# Patient Record
Sex: MALE | Race: WHITE | NOT HISPANIC OR LATINO | Employment: UNEMPLOYED | URBAN - METROPOLITAN AREA
[De-identification: names, ages, dates, MRNs, and addresses within clinical notes are randomized per-mention and may not be internally consistent; named-entity substitution may affect disease eponyms.]

---

## 2022-07-29 ENCOUNTER — HOSPITAL ENCOUNTER (OUTPATIENT)
Facility: HOSPITAL | Age: 67
Discharge: HOME | End: 2022-07-29
Attending: INTERNAL MEDICINE | Admitting: INTERNAL MEDICINE
Payer: MEDICARE

## 2022-07-29 VITALS
OXYGEN SATURATION: 96 % | DIASTOLIC BLOOD PRESSURE: 103 MMHG | TEMPERATURE: 98 F | HEIGHT: 70 IN | HEART RATE: 73 BPM | WEIGHT: 261 LBS | SYSTOLIC BLOOD PRESSURE: 168 MMHG | BODY MASS INDEX: 37.37 KG/M2 | RESPIRATION RATE: 17 BRPM

## 2022-07-29 DIAGNOSIS — I25.10 CORONARY ATHEROSCLEROSIS OF NATIVE CORONARY ARTERY: Primary | ICD-10-CM

## 2022-07-29 DIAGNOSIS — I25.10 CAD (CORONARY ARTERY DISEASE): ICD-10-CM

## 2022-07-29 PROCEDURE — 92937 PRQ TRLUML REVSC CAB GRF 1: CPT | Mod: LD | Performed by: INTERNAL MEDICINE

## 2022-07-29 PROCEDURE — 27201423 OPTIME MED/SURG SUP & DEVICES STERILE SUPPLY: Performed by: INTERNAL MEDICINE

## 2022-07-29 PROCEDURE — C1725 CATH, TRANSLUMIN NON-LASER: HCPCS | Performed by: INTERNAL MEDICINE

## 2022-07-29 PROCEDURE — 93458 L HRT ARTERY/VENTRICLE ANGIO: CPT | Mod: 59 | Performed by: INTERNAL MEDICINE

## 2022-07-29 PROCEDURE — C1887 CATHETER, GUIDING: HCPCS | Performed by: INTERNAL MEDICINE

## 2022-07-29 PROCEDURE — C1894 INTRO/SHEATH, NON-LASER: HCPCS | Performed by: INTERNAL MEDICINE

## 2022-07-29 PROCEDURE — 63600175 PHARM REV CODE 636 W HCPCS: Performed by: INTERNAL MEDICINE

## 2022-07-29 PROCEDURE — 93010 ELECTROCARDIOGRAM REPORT: CPT | Mod: XE,,, | Performed by: INTERNAL MEDICINE

## 2022-07-29 PROCEDURE — 93010 EKG 12-LEAD: ICD-10-PCS | Mod: XE,,, | Performed by: INTERNAL MEDICINE

## 2022-07-29 PROCEDURE — 25500020 PHARM REV CODE 255: Performed by: INTERNAL MEDICINE

## 2022-07-29 PROCEDURE — 99153 MOD SED SAME PHYS/QHP EA: CPT | Performed by: INTERNAL MEDICINE

## 2022-07-29 PROCEDURE — 93005 ELECTROCARDIOGRAM TRACING: CPT | Mod: 59

## 2022-07-29 PROCEDURE — C1769 GUIDE WIRE: HCPCS | Performed by: INTERNAL MEDICINE

## 2022-07-29 PROCEDURE — 63600175 PHARM REV CODE 636 W HCPCS

## 2022-07-29 PROCEDURE — 25000003 PHARM REV CODE 250: Performed by: INTERNAL MEDICINE

## 2022-07-29 PROCEDURE — 99152 MOD SED SAME PHYS/QHP 5/>YRS: CPT | Performed by: INTERNAL MEDICINE

## 2022-07-29 RX ORDER — CLOPIDOGREL BISULFATE 75 MG/1
75 TABLET ORAL DAILY
COMMUNITY
Start: 2022-05-19

## 2022-07-29 RX ORDER — SODIUM CHLORIDE 9 MG/ML
INJECTION, SOLUTION INTRAVENOUS ONCE
Status: COMPLETED | OUTPATIENT
Start: 2022-07-29 | End: 2022-07-29

## 2022-07-29 RX ORDER — FENTANYL CITRATE 50 UG/ML
INJECTION, SOLUTION INTRAMUSCULAR; INTRAVENOUS
Status: DISCONTINUED | OUTPATIENT
Start: 2022-07-29 | End: 2022-07-29 | Stop reason: HOSPADM

## 2022-07-29 RX ORDER — MIDAZOLAM HYDROCHLORIDE 1 MG/ML
INJECTION INTRAMUSCULAR; INTRAVENOUS
Status: DISCONTINUED | OUTPATIENT
Start: 2022-07-29 | End: 2022-07-29 | Stop reason: HOSPADM

## 2022-07-29 RX ORDER — ATROPINE SULFATE 0.1 MG/ML
0.5 INJECTION INTRAVENOUS
Status: DISCONTINUED | OUTPATIENT
Start: 2022-07-29 | End: 2022-07-29 | Stop reason: HOSPADM

## 2022-07-29 RX ORDER — ACETAMINOPHEN 325 MG/1
650 TABLET ORAL EVERY 4 HOURS PRN
Status: DISCONTINUED | OUTPATIENT
Start: 2022-07-29 | End: 2022-07-29 | Stop reason: HOSPADM

## 2022-07-29 RX ORDER — LIDOCAINE HYDROCHLORIDE 20 MG/ML
INJECTION, SOLUTION EPIDURAL; INFILTRATION; INTRACAUDAL; PERINEURAL
Status: DISCONTINUED | OUTPATIENT
Start: 2022-07-29 | End: 2022-07-29 | Stop reason: HOSPADM

## 2022-07-29 RX ORDER — OMEPRAZOLE 40 MG/1
40 CAPSULE, DELAYED RELEASE ORAL DAILY
COMMUNITY

## 2022-07-29 RX ORDER — ROSUVASTATIN CALCIUM 20 MG/1
20 TABLET, COATED ORAL DAILY
COMMUNITY
Start: 2022-06-29

## 2022-07-29 RX ORDER — MORPHINE SULFATE 4 MG/ML
INJECTION, SOLUTION INTRAMUSCULAR; INTRAVENOUS
Status: COMPLETED
Start: 2022-07-29 | End: 2022-07-29

## 2022-07-29 RX ORDER — LISINOPRIL 30 MG/1
30 TABLET ORAL DAILY
COMMUNITY
Start: 2022-06-29

## 2022-07-29 RX ORDER — PROTAMINE SULFATE 10 MG/ML
INJECTION, SOLUTION INTRAVENOUS
Status: DISCONTINUED | OUTPATIENT
Start: 2022-07-29 | End: 2022-07-29 | Stop reason: HOSPADM

## 2022-07-29 RX ORDER — DIAZEPAM 5 MG/1
10 TABLET ORAL ONCE
Status: COMPLETED | OUTPATIENT
Start: 2022-07-29 | End: 2022-07-29

## 2022-07-29 RX ORDER — ASPIRIN 81 MG/1
81 TABLET ORAL DAILY
COMMUNITY

## 2022-07-29 RX ORDER — DIPHENHYDRAMINE HCL 25 MG
50 CAPSULE ORAL
Status: DISPENSED | OUTPATIENT
Start: 2022-07-29

## 2022-07-29 RX ORDER — SODIUM CHLORIDE 0.9 % (FLUSH) 0.9 %
10 SYRINGE (ML) INJECTION
Status: ACTIVE | OUTPATIENT
Start: 2022-07-29

## 2022-07-29 RX ORDER — HEPARIN SODIUM 1000 [USP'U]/ML
INJECTION, SOLUTION INTRAVENOUS; SUBCUTANEOUS
Status: DISCONTINUED | OUTPATIENT
Start: 2022-07-29 | End: 2022-07-29 | Stop reason: HOSPADM

## 2022-07-29 RX ORDER — CARVEDILOL 6.25 MG/1
6.25 TABLET ORAL 2 TIMES DAILY
COMMUNITY
Start: 2022-05-18

## 2022-07-29 RX ORDER — ONDANSETRON 4 MG/1
8 TABLET, ORALLY DISINTEGRATING ORAL EVERY 8 HOURS PRN
Status: DISCONTINUED | OUTPATIENT
Start: 2022-07-29 | End: 2022-07-29 | Stop reason: HOSPADM

## 2022-07-29 RX ORDER — HYDRALAZINE HYDROCHLORIDE 20 MG/ML
10 INJECTION INTRAMUSCULAR; INTRAVENOUS ONCE
Status: COMPLETED | OUTPATIENT
Start: 2022-07-29 | End: 2022-07-29

## 2022-07-29 RX ADMIN — DIAZEPAM 10 MG: 5 TABLET ORAL at 08:07

## 2022-07-29 RX ADMIN — SODIUM CHLORIDE: 9 INJECTION, SOLUTION INTRAVENOUS at 08:07

## 2022-07-29 RX ADMIN — HYDRALAZINE HYDROCHLORIDE 10 MG: 20 INJECTION, SOLUTION INTRAMUSCULAR; INTRAVENOUS at 02:07

## 2022-07-29 RX ADMIN — ACETAMINOPHEN 650 MG: 325 TABLET ORAL at 01:07

## 2022-07-29 RX ADMIN — MORPHINE SULFATE 4 MG: 4 INJECTION INTRAVENOUS at 03:07

## 2022-07-29 RX ADMIN — DIPHENHYDRAMINE HYDROCHLORIDE 50 MG: 25 CAPSULE ORAL at 08:07

## 2022-07-29 NOTE — Clinical Note
An angiography was performed of the LIMA to LAD. The angiography was performed via power injection.   Post intervention.

## 2022-07-29 NOTE — BRIEF OP NOTE
Brief Operative Note:    : Constantin Martino MD     Referring Physician: Constantin Martino     All Operators: Surgeon(s):  Constantin Martino MD     Preoperative Diagnosis: Coronary atherosclerosis of native coronary artery [I25.10]     Postop Diagnosis: Coronary atherosclerosis of native coronary artery [I25.10]    Treatments/Procedures: Procedure(s) (LRB):  ANGIOGRAM, CORONARY, INCLUDING BYPASS GRAFT, WITH LEFT HEART CATHETERIZATION (Left)  PTA, BYPASS GRAFT, USING BALLOON (N/A)    Findings: RCA occluded. Lcx occluded. LAD occluded in prox segment. LIMA-LAD with severe ISR at the anastomoses site. S/p PTCA with 1.5 mm, 2 mm CB and then finally with 2.5 mm NC using a scott wire. Mild residual disease at the prox stent (at the fibrotic anastomotic site), minimal disease in the rest of the stent.  SVG-Diag occluded. LRA - OM patent. LVEDP 12 mm Hg.  See catheterization report for full details.    Estimated Blood loss: 20 cc    Specimens removed: No    Constantin Matrino MD

## 2022-07-29 NOTE — Clinical Note
The catheter was removed from the and was repositioned into the ostial SVG graft. An angiography was performed of the diag. The angiography was performed via power injection.

## 2022-07-29 NOTE — Clinical Note
The wire was inserted into the distal left anterior descending artery.  Inserted through LIMA graft to distal LAD.

## 2022-07-29 NOTE — Clinical Note
An angiography was performed of the LIMA to LAD. The angiography was performed via power injection.  Post intervention

## 2022-07-29 NOTE — Clinical Note
The catheter was inserted into the and was removed from the ostium   left main. An angiography was performed of the left coronary arteries. Multiple views were taken. The angiography was performed via power injection.

## 2022-07-29 NOTE — Clinical Note
The defib pads were placed on the patient's chest.   Referred To Mid-Level For Closure Text (Leave Blank If You Do Not Want): After obtaining clear surgical margins the patient was sent to a mid-level provider for surgical repair.  The patient understands they will receive post-surgical care and follow-up from the mid-level provider.

## 2022-07-29 NOTE — Clinical Note
The catheter was inserted into the and was removed from the ostial LIMA graft. An angiography was performed of the LAD. Multiple views were taken. The angiography was performed via power injection.

## 2022-07-29 NOTE — Clinical Note
The catheter was inserted into the ostial LIMA graft. An angiography was performed of the LAD. Multiple views were taken. The angiography was performed via power injection.

## 2022-07-29 NOTE — Clinical Note
The catheter was repositioned into the radial graft. An angiography was performed of the OM. Multiple views were taken. The angiography was performed via power injection.

## 2022-07-29 NOTE — Clinical Note
The catheter was inserted into the and was removed from the left ventricle. Hemodynamics were performed.  and Pullback was recorded.

## 2024-04-19 ENCOUNTER — HOSPITAL ENCOUNTER (OUTPATIENT)
Facility: HOSPITAL | Age: 69
Discharge: HOME | End: 2024-04-19
Attending: INTERNAL MEDICINE | Admitting: INTERNAL MEDICINE
Payer: MEDICARE

## 2024-04-19 DIAGNOSIS — I25.10 CAD (CORONARY ARTERY DISEASE): ICD-10-CM

## 2024-04-19 DIAGNOSIS — R07.9 CHEST PAIN: ICD-10-CM

## 2024-04-19 DIAGNOSIS — R94.8 ABNORMAL PET SCAN, MEDIASTINUM: ICD-10-CM

## 2024-04-19 LAB
BSA FOR ECHO PROCEDURE: 2.46 M2
CV ECHO LV RWT: 0.46 CM
ECHO LV POSTERIOR WALL: 1.31 CM (ref 0.6–1.1)
ERYTHROCYTE [DISTWIDTH] IN BLOOD BY AUTOMATED COUNT: 12.1 % (ref 11.5–17)
FRACTIONAL SHORTENING: 28 % (ref 28–44)
HCT VFR BLD AUTO: 46 % (ref 42–52)
HGB BLD-MCNC: 15.5 G/DL (ref 14–18)
INTERVENTRICULAR SEPTUM: 1.18 CM (ref 0.6–1.1)
LEFT INTERNAL DIMENSION IN SYSTOLE: 4.13 CM (ref 2.1–4)
LEFT VENTRICLE DIASTOLIC VOLUME INDEX: 68.35 ML/M2
LEFT VENTRICLE DIASTOLIC VOLUME: 162 ML
LEFT VENTRICLE MASS INDEX: 129 G/M2
LEFT VENTRICLE SYSTOLIC VOLUME INDEX: 31.9 ML/M2
LEFT VENTRICLE SYSTOLIC VOLUME: 75.5 ML
LEFT VENTRICULAR INTERNAL DIMENSION IN DIASTOLE: 5.73 CM (ref 3.5–6)
LEFT VENTRICULAR MASS: 306.18 G
MCH RBC QN AUTO: 31.9 PG (ref 27–31)
MCHC RBC AUTO-ENTMCNC: 33.7 G/DL (ref 33–36)
MCV RBC AUTO: 94.7 FL (ref 80–94)
NRBC BLD AUTO-RTO: 0 %
PLATELET # BLD AUTO: 240 X10(3)/MCL (ref 130–400)
PMV BLD AUTO: 9.8 FL (ref 7.4–10.4)
RBC # BLD AUTO: 4.86 X10(6)/MCL (ref 4.7–6.1)
WBC # SPEC AUTO: 7.03 X10(3)/MCL (ref 4.5–11.5)
Z-SCORE OF LEFT VENTRICULAR DIMENSION IN END DIASTOLE: -5.81
Z-SCORE OF LEFT VENTRICULAR DIMENSION IN END SYSTOLE: -3.15

## 2024-04-19 PROCEDURE — 25000003 PHARM REV CODE 250: Performed by: INTERNAL MEDICINE

## 2024-04-19 PROCEDURE — 25500020 PHARM REV CODE 255: Performed by: INTERNAL MEDICINE

## 2024-04-19 PROCEDURE — 93459 L HRT ART/GRFT ANGIO: CPT | Performed by: INTERNAL MEDICINE

## 2024-04-19 PROCEDURE — 85027 COMPLETE CBC AUTOMATED: CPT | Performed by: INTERNAL MEDICINE

## 2024-04-19 PROCEDURE — C1769 GUIDE WIRE: HCPCS | Performed by: INTERNAL MEDICINE

## 2024-04-19 PROCEDURE — 63600175 PHARM REV CODE 636 W HCPCS: Performed by: INTERNAL MEDICINE

## 2024-04-19 PROCEDURE — C1894 INTRO/SHEATH, NON-LASER: HCPCS | Performed by: INTERNAL MEDICINE

## 2024-04-19 PROCEDURE — 99152 MOD SED SAME PHYS/QHP 5/>YRS: CPT | Performed by: INTERNAL MEDICINE

## 2024-04-19 PROCEDURE — 27201423 OPTIME MED/SURG SUP & DEVICES STERILE SUPPLY: Performed by: INTERNAL MEDICINE

## 2024-04-19 PROCEDURE — 99153 MOD SED SAME PHYS/QHP EA: CPT | Performed by: INTERNAL MEDICINE

## 2024-04-19 RX ORDER — MELOXICAM 7.5 MG/1
7.5 TABLET ORAL DAILY PRN
Status: ON HOLD | COMMUNITY
End: 2024-06-10 | Stop reason: HOSPADM

## 2024-04-19 RX ORDER — SODIUM CHLORIDE 0.9 % (FLUSH) 0.9 %
10 SYRINGE (ML) INJECTION
Status: ACTIVE | OUTPATIENT
Start: 2024-04-19

## 2024-04-19 RX ORDER — CYCLOBENZAPRINE HCL 10 MG
10 TABLET ORAL 3 TIMES DAILY PRN
Status: ON HOLD | COMMUNITY
End: 2024-06-10 | Stop reason: HOSPADM

## 2024-04-19 RX ORDER — NITROGLYCERIN 0.4 MG/1
0.4 TABLET SUBLINGUAL EVERY 5 MIN PRN
COMMUNITY

## 2024-04-19 RX ORDER — SODIUM CHLORIDE 9 MG/ML
INJECTION, SOLUTION INTRAVENOUS CONTINUOUS
Status: DISCONTINUED | OUTPATIENT
Start: 2024-04-19 | End: 2024-04-19 | Stop reason: HOSPADM

## 2024-04-19 RX ORDER — HYDRALAZINE HYDROCHLORIDE 20 MG/ML
10 INJECTION INTRAMUSCULAR; INTRAVENOUS EVERY 4 HOURS PRN
Status: DISCONTINUED | OUTPATIENT
Start: 2024-04-19 | End: 2024-04-19 | Stop reason: HOSPADM

## 2024-04-19 RX ORDER — FENTANYL CITRATE 50 UG/ML
INJECTION, SOLUTION INTRAMUSCULAR; INTRAVENOUS
Status: DISCONTINUED | OUTPATIENT
Start: 2024-04-19 | End: 2024-04-19 | Stop reason: HOSPADM

## 2024-04-19 RX ORDER — DIAZEPAM 5 MG/1
10 TABLET ORAL
Status: DISPENSED | OUTPATIENT
Start: 2024-04-19

## 2024-04-19 RX ORDER — LEVOCETIRIZINE DIHYDROCHLORIDE 5 MG/1
5 TABLET, FILM COATED ORAL DAILY PRN
COMMUNITY

## 2024-04-19 RX ORDER — ACETAMINOPHEN 325 MG/1
650 TABLET ORAL EVERY 4 HOURS PRN
Status: DISCONTINUED | OUTPATIENT
Start: 2024-04-19 | End: 2024-04-19 | Stop reason: HOSPADM

## 2024-04-19 RX ORDER — DIPHENHYDRAMINE HCL 25 MG
50 CAPSULE ORAL
Status: DISPENSED | OUTPATIENT
Start: 2024-04-19

## 2024-04-19 RX ORDER — LIDOCAINE HYDROCHLORIDE 10 MG/ML
INJECTION, SOLUTION EPIDURAL; INFILTRATION; INTRACAUDAL; PERINEURAL
Status: DISCONTINUED | OUTPATIENT
Start: 2024-04-19 | End: 2024-04-19 | Stop reason: HOSPADM

## 2024-04-19 RX ORDER — ISOSORBIDE MONONITRATE 30 MG/1
30 TABLET, EXTENDED RELEASE ORAL DAILY
COMMUNITY

## 2024-04-19 RX ORDER — RANOLAZINE 500 MG/1
500 TABLET, EXTENDED RELEASE ORAL 2 TIMES DAILY
COMMUNITY

## 2024-04-19 RX ORDER — MIDAZOLAM HYDROCHLORIDE 1 MG/ML
INJECTION INTRAMUSCULAR; INTRAVENOUS
Status: DISCONTINUED | OUTPATIENT
Start: 2024-04-19 | End: 2024-04-19 | Stop reason: HOSPADM

## 2024-04-19 RX ORDER — SODIUM CHLORIDE 9 MG/ML
INJECTION, SOLUTION INTRAVENOUS ONCE
Status: COMPLETED | OUTPATIENT
Start: 2024-04-19 | End: 2024-04-19

## 2024-04-19 RX ORDER — GABAPENTIN 100 MG/1
100 CAPSULE ORAL 2 TIMES DAILY
COMMUNITY

## 2024-04-19 RX ORDER — ONDANSETRON 4 MG/1
8 TABLET, ORALLY DISINTEGRATING ORAL EVERY 8 HOURS PRN
Status: DISCONTINUED | OUTPATIENT
Start: 2024-04-19 | End: 2024-04-19 | Stop reason: HOSPADM

## 2024-04-19 RX ADMIN — PERFLUTREN 1.4 ML: 6.52 INJECTION, SUSPENSION INTRAVENOUS at 03:04

## 2024-04-19 RX ADMIN — SODIUM CHLORIDE: 9 INJECTION, SOLUTION INTRAVENOUS at 06:04

## 2024-04-19 RX ADMIN — DIAZEPAM 10 MG: 5 TABLET ORAL at 07:04

## 2024-04-19 RX ADMIN — SODIUM CHLORIDE: 9 INJECTION, SOLUTION INTRAVENOUS at 12:04

## 2024-04-19 RX ADMIN — HYDRALAZINE HYDROCHLORIDE 10 MG: 20 INJECTION INTRAMUSCULAR; INTRAVENOUS at 11:04

## 2024-04-19 RX ADMIN — DIPHENHYDRAMINE HYDROCHLORIDE 50 MG: 25 CAPSULE ORAL at 07:04

## 2024-04-19 NOTE — INTERVAL H&P NOTE
Patient name: Carlos Waller  MRN: [id number]  : 1955  Cath Lab Procedure H&P Update    Pre-Procedure Assessment:    I saw and examined the patient face to face. The patient has been re-evaluated and his condition is unchanged. The reason for admission, procedure and care is still present.  Based on the patients H&P, pre-procedure physical exam, relevant diagnostic studies, NPO status and information obtained from the patient, I determine the patient is an appropriate candidate for the proposed procedure and anesthesia planned. I further certify the anesthesia risks, benefits and options have been explained to the patient to which he agrees as documented on the procedural consent.

## 2024-04-19 NOTE — DISCHARGE SUMMARY
[facility] - Cath Lab Services  Discharge Note  Short Stay    Procedure(s) (LRB):  Left heart cath (Left)      OUTCOME: Patient tolerated treatment/procedure well without complication and is now ready for discharge.    DISPOSITION: Home or Self Care    FINAL DIAGNOSIS:  CAD s/p CABG    FOLLOWUP: In clinic    DISCHARGE INSTRUCTIONS:  See dc orders.    TIME SPENT ON DISCHARGE: 31 minutes

## 2024-04-19 NOTE — Clinical Note
The catheter was repositioned into the ostial LIMA graft. An angiography was performed of the left coronary arteries. Multiple views were taken. The angiography was performed via power injection.  ck

## 2024-04-19 NOTE — Clinical Note
The catheter was inserted into the ostium   left main. Hemodynamics were performed.  An angiography was performed of the left coronary arteries. Multiple views were taken. The angiography was performed via power injection.  jl4

## 2024-04-19 NOTE — Clinical Note
The catheter was inserted into the radial graft. An angiography was performed of the left coronary arteries. Multiple views were taken. The angiography was performed via power injection.  Jr4  Radial-OM graft

## 2024-04-19 NOTE — Clinical Note
The catheter was inserted into the left ventricle. Hemodynamics were performed.  and Pullback was recorded.  pigtail

## 2024-04-22 VITALS
OXYGEN SATURATION: 98 % | RESPIRATION RATE: 20 BRPM | SYSTOLIC BLOOD PRESSURE: 129 MMHG | WEIGHT: 270.75 LBS | HEART RATE: 71 BPM | TEMPERATURE: 98 F | BODY MASS INDEX: 38.76 KG/M2 | HEIGHT: 70 IN | DIASTOLIC BLOOD PRESSURE: 72 MMHG

## 2024-04-25 ENCOUNTER — OFFICE VISIT (OUTPATIENT)
Dept: CARDIAC SURGERY | Facility: CLINIC | Age: 69
End: 2024-04-25
Payer: MEDICARE

## 2024-04-25 VITALS
WEIGHT: 275 LBS | HEART RATE: 63 BPM | DIASTOLIC BLOOD PRESSURE: 79 MMHG | BODY MASS INDEX: 39.37 KG/M2 | SYSTOLIC BLOOD PRESSURE: 119 MMHG | OXYGEN SATURATION: 98 % | HEIGHT: 70 IN

## 2024-04-25 DIAGNOSIS — I50.32 CHRONIC DIASTOLIC (CONGESTIVE) HEART FAILURE: Primary | Chronic | ICD-10-CM

## 2024-04-25 DIAGNOSIS — I25.810 CORONARY ARTERY DISEASE INVOLVING CORONARY BYPASS GRAFT OF NATIVE HEART, UNSPECIFIED WHETHER ANGINA PRESENT: ICD-10-CM

## 2024-04-25 PROBLEM — I25.10 CAD (CORONARY ARTERY DISEASE): Status: ACTIVE | Noted: 2024-04-25

## 2024-04-25 PROCEDURE — 1159F MED LIST DOCD IN RCRD: CPT | Mod: CPTII,,, | Performed by: THORACIC SURGERY (CARDIOTHORACIC VASCULAR SURGERY)

## 2024-04-25 PROCEDURE — 99204 OFFICE O/P NEW MOD 45 MIN: CPT | Mod: ,,, | Performed by: THORACIC SURGERY (CARDIOTHORACIC VASCULAR SURGERY)

## 2024-04-25 PROCEDURE — 1160F RVW MEDS BY RX/DR IN RCRD: CPT | Mod: CPTII,,, | Performed by: THORACIC SURGERY (CARDIOTHORACIC VASCULAR SURGERY)

## 2024-04-25 PROCEDURE — 3074F SYST BP LT 130 MM HG: CPT | Mod: CPTII,,, | Performed by: THORACIC SURGERY (CARDIOTHORACIC VASCULAR SURGERY)

## 2024-04-25 PROCEDURE — 3078F DIAST BP <80 MM HG: CPT | Mod: CPTII,,, | Performed by: THORACIC SURGERY (CARDIOTHORACIC VASCULAR SURGERY)

## 2024-04-25 PROCEDURE — 4010F ACE/ARB THERAPY RXD/TAKEN: CPT | Mod: CPTII,,, | Performed by: THORACIC SURGERY (CARDIOTHORACIC VASCULAR SURGERY)

## 2024-04-25 PROCEDURE — 3008F BODY MASS INDEX DOCD: CPT | Mod: CPTII,,, | Performed by: THORACIC SURGERY (CARDIOTHORACIC VASCULAR SURGERY)

## 2024-04-25 RX ORDER — ASPIRIN AND DIPYRIDAMOLE 25; 200 MG/1; MG/1
CAPSULE, EXTENDED RELEASE ORAL
Status: ON HOLD | COMMUNITY
Start: 2024-03-22 | End: 2024-06-05

## 2024-04-25 RX ORDER — EZETIMIBE 10 MG/1
10 TABLET ORAL
Status: ON HOLD | COMMUNITY
Start: 2023-11-21 | End: 2024-06-05

## 2024-04-25 NOTE — ASSESSMENT & PLAN NOTE
Discussed with Dr. Flores.  Diffuse nature of this patient's disease, previous history of coronary artery bypass grafting, and comorbid conditions lead us to advocate high-risk PCI with Impella support as the best option for revascularization for this young man.  The patient is also very reluctant to consent for repeat surgical therapy.  Will refer to Dr. Flores for scheduling.  If trans axillary Impella is necessary, will accommodate.

## 2024-04-25 NOTE — ASSESSMENT & PLAN NOTE
Will schedule right carotid Barostim procedure.  Risks, benefits, and alternatives have been discussed.  Questions have been answered.  Patient voices understanding and agrees to proceed.

## 2024-04-25 NOTE — PROGRESS NOTES
[facility]  Cardiothoracic Surgery  Consult Note    Patient Name: Carlos Waller  MRN: [id number]  Date of Service: 4/25/2024  Referring Provider: No ref. provider found    Patient information was obtained from patient, past medical records, and primary team    Subjective:     Chief Complaint   Patient presents with    Follow-up     Per MELLISSA Christina-follow up cabg redo from [facility]  DISCUSS RE-DO CABG.  LHC DONE ON 4/16/24 )*IN EPIC)  ECHO 4/19/24 IN [facility]  CAROTID US - NONE  PMH: CABG X 3, CVA, HTN, HERMINIA, HLD          History of Present Illness:  Patient is a 68-year-old black male with past history of vasculopathy who now presents for evaluation for barostim device placement.  Patient has increasing shortness of breath and dyspnea on exertion with bilateral lower extremity edema consistent with diastolic congestive heart failure.  He is on optimal medical therapy and has an AICD.  His [place] heart Association class has been judged to be 3 by Dr. Morrell.  His ejection fraction less than 20% and his NT pro BNP is 3493.  He has carotid ultrasound examination revealing no significant carotid artery disease.      The patient denies fever, chills, nausea, vomiting, headache, syncope, chest pain, back pain, or heart palpitations.  Current Outpatient Medications   Medication Sig Dispense Refill    aspirin (ECOTRIN) 81 MG EC tablet Take 81 mg by mouth once daily.      carvediloL (COREG) 6.25 MG tablet Take 6.25 mg by mouth 2 (two) times daily.      clopidogreL (PLAVIX) 75 mg tablet Take 75 mg by mouth once daily.      cyclobenzaprine (FLEXERIL) 10 MG tablet Take 10 mg by mouth 3 (three) times daily as needed for Muscle spasms.      dipyridamole-aspirin 200-25 mg (AGGRENOX)  mg CM12 Take by mouth.      ezetimibe (ZETIA) 10 mg tablet Take 10 mg by mouth.      gabapentin (NEURONTIN) 100 MG capsule Take 100 mg by mouth 2 (two) times daily.      isosorbide mononitrate (IMDUR) 30 MG 24 hr  tablet Take 30 mg by mouth once daily.      levocetirizine (XYZAL) 5 MG tablet Take 5 mg by mouth daily as needed for Allergies.      lisinopriL (PRINIVIL,ZESTRIL) 30 MG tablet Take 20 mg by mouth once daily.      meloxicam (MOBIC) 7.5 MG tablet Take 7.5 mg by mouth daily as needed for Pain.      nitroGLYCERIN (NITROSTAT) 0.4 MG SL tablet Place 0.4 mg under the tongue every 5 (five) minutes as needed for Chest pain.      omeprazole (PRILOSEC) 40 MG capsule Take 40 mg by mouth once daily.      ranolazine (RANEXA) 500 MG Tb12 Take 500 mg by mouth 2 (two) times daily.      rosuvastatin (CRESTOR) 20 MG tablet Take 20 mg by mouth once daily.       No current facility-administered medications for this visit.     Facility-Administered Medications Ordered in Other Visits   Medication Dose Route Frequency Provider Last Rate Last Admin    diazePAM tablet 10 mg  10 mg Oral On Call Procedure Constantin Martino MD   10 mg at 04/19/24 0729    diphenhydrAMINE capsule 50 mg  50 mg Oral On Call Procedure Constantin Martino MD   50 mg at 07/29/22 0844    diphenhydrAMINE capsule 50 mg  50 mg Oral On Call Procedure Constantin Martino MD   50 mg at 04/19/24 0730    sodium chloride 0.9% flush 10 mL  10 mL Intravenous PRN Constantin Martino MD        sodium chloride 0.9% flush 10 mL  10 mL Intravenous PRN Constantin Martino MD           Review of patient's allergies indicates:   Allergen Reactions    Codeine Itching       Past Medical History:   Diagnosis Date    Anemia, unspecified     Coronary artery disease     Hyperlipidemia     Hypertension      Past Surgical History:   Procedure Laterality Date    CARDIAC CATHETERIZATION  2016    CORONARY ANGIOGRAPHY INCLUDING BYPASS GRAFTS WITH CATHETERIZATION OF LEFT HEART Left 7/29/2022    Procedure: ANGIOGRAM, CORONARY, INCLUDING BYPASS GRAFT, WITH LEFT HEART CATHETERIZATION;  Surgeon: Constantin Martino MD;  Location: [facility] CATH LAB;  Service: Cardiology;  Laterality: Left;    CORONARY ARTERY BYPASS GRAFT   1999    CORONARY BYPASS GRAFT ANGIOGRAPHY  4/19/2024    Procedure: Bypass graft study;  Surgeon: Constantin Martino MD;  Location: [facility] CATH LAB;  Service: Cardiology;;    HERNIA REPAIR      LEFT HEART CATHETERIZATION Left 4/19/2024    Procedure: Left heart cath;  Surgeon: Constantin Martino MD;  Location: [facility] CATH LAB;  Service: Cardiology;  Laterality: Left;  LHC +/- PCI VIA RT FEMORAL ARTERY    PERCUTANEOUS TRANSLUMINAL BALLOON ANGIOPLASTY OF BYPASS GRAFT N/A 7/29/2022    Procedure: PTA, BYPASS GRAFT, USING BALLOON;  Surgeon: Constantin Martino MD;  Location: [facility] CATH LAB;  Service: Cardiology;  Laterality: N/A;     Family History       Problem Relation (Age of Onset)    Cancer Father    Stroke Mother          Tobacco Use    Smoking status: Never    Smokeless tobacco: Never   Substance and Sexual Activity    Alcohol use: Yes     Alcohol/week: 10.0 standard drinks of alcohol     Types: 10 Cans of beer per week    Drug use: Not Currently    Sexual activity: Not on file     Review of Systems   Constitutional: Negative. Negative for chills, diaphoresis, fever and night sweats.   HENT:  Negative for hoarse voice, sore throat and stridor.    Eyes: Negative.  Negative for blurred vision and double vision.   Cardiovascular:  Negative for chest pain, claudication, cyanosis, dyspnea on exertion, irregular heartbeat, leg swelling, orthopnea, palpitations, paroxysmal nocturnal dyspnea and syncope.   Respiratory:  Negative for cough, hemoptysis, shortness of breath, sputum production and wheezing.    Endocrine: Negative for polydipsia and polyuria.   Hematologic/Lymphatic: Negative for adenopathy and bleeding problem.   Musculoskeletal:  Positive for joint swelling, muscle weakness and stiffness.   Gastrointestinal:  Negative for abdominal pain, diarrhea, heartburn, hematemesis, hematochezia, nausea and vomiting.   Neurological:  Negative for brief paralysis, disturbances in coordination, dizziness, focal weakness, headaches,  numbness and paresthesias.     Objective:     Vitals:    04/25/24 0805   BP: 119/79   Pulse: 63        Weight: 124.7 kg (275 lb)  Body mass index is 39.46 kg/m².     STS Risk Score:  Not applicable    Physical Exam  Vitals reviewed.   Constitutional:       General: He is not in acute distress.     Appearance: Normal appearance.   HENT:      Head: Normocephalic and atraumatic.      Nose: No congestion or rhinorrhea.      Mouth/Throat:      Mouth: Mucous membranes are moist.      Pharynx: Oropharynx is clear. No oropharyngeal exudate or posterior oropharyngeal erythema.   Eyes:      Extraocular Movements: Extraocular movements intact.      Conjunctiva/sclera: Conjunctivae normal.      Pupils: Pupils are equal, round, and reactive to light.   Neck:      Thyroid: No thyroid mass.      Vascular: No carotid bruit or JVD.   Cardiovascular:      Rate and Rhythm: Normal rate and regular rhythm.      Pulses: Normal pulses.           Carotid pulses are 2+ on the right side and 2+ on the left side.       Radial pulses are 2+ on the right side and 2+ on the left side.        Femoral pulses are 2+ on the right side and 2+ on the left side.       Dorsalis pedis pulses are 2+ on the right side and 2+ on the left side.        Posterior tibial pulses are 2+ on the right side and 2+ on the left side.      Heart sounds: No murmur heard.     No friction rub. Gallop present. S3 sounds present.   Pulmonary:      Effort: Pulmonary effort is normal. No respiratory distress.      Breath sounds: Rales present. No wheezing or rhonchi.   Chest:      Chest wall: No tenderness.   Abdominal:      General: Abdomen is flat. Bowel sounds are normal. There is no distension.      Palpations: Abdomen is soft. There is no mass.      Tenderness: There is no abdominal tenderness.   Musculoskeletal:         General: No swelling. Normal range of motion.      Cervical back: Normal range of motion and neck supple.      Right lower leg: Edema present.      Left  lower leg: Edema present.   Lymphadenopathy:      Cervical: No cervical adenopathy.      Upper Body:      Right upper body: No supraclavicular or axillary adenopathy.      Left upper body: No supraclavicular or axillary adenopathy.   Skin:     General: Skin is warm and dry.   Neurological:      General: No focal deficit present.      Mental Status: He is alert and oriented to person, place, and time.      Cranial Nerves: No cranial nerve deficit.      Sensory: No sensory deficit.      Motor: No weakness.   Psychiatric:         Mood and Affect: Mood normal.          Significant Labs:  Reviewed    Significant Diagnostics:  Reviewed    Assessment/Plan:     Problem List Items Addressed This Visit          Cardiac/Vascular    Chronic diastolic (congestive) heart failure - Primary (Chronic)     Will schedule right carotid Barostim procedure.  Risks, benefits, and alternatives have been discussed.  Questions have been answered.  Patient voices understanding and agrees to proceed.             Thank you for your consult. I will follow-up with patient. Please contact us if you have any additional questions.    HANG Zuleta MD, FACC, FACS  Cardiothoracic Surgery  [facility]

## 2024-04-25 NOTE — PROGRESS NOTES
[facility]  Cardiothoracic Surgery  Consult Note    Patient Name: Carlos Waller  MRN: [id number]  Date of Service: 4/25/2024  Referring Provider: No ref. provider found    Patient information was obtained from patient, past medical records, and primary team    Subjective:     Chief Complaint   Patient presents with    Follow-up     Per MELLISSA Christina-follow up cabg redo from [facility]  DISCUSS RE-DO CABG.  LHC DONE ON 4/16/24 )*IN EPIC)  ECHO 4/19/24 IN [facility]  CAROTID US - NONE  PMH: CABG X 3, CVA, HTN, HERMINIA, HLD          History of Present Illness:  Patient is a very nice 68-year-old obese man with history of coronary artery disease status post coronary artery bypass grafting and peripheral arterial revascularization.  He comes now with stable angina and a recent left heart catheterization which reveals severe three-vessel coronary artery disease and moderate to severely depressed left ventricular function.  Coronary anatomy is indicative of severe diffuse disease.  Echocardiogram noted.    Discussed with Dr. Flores  and films reviewed with him and we have come to the shared decision that percutaneous intervention with Impella support would be the best option for this very nice patient.    Patient denies fever, chills, nausea, vomiting, headache, syncope, chest pain, back pain, shortness of breath, or palpitations.  He has had does have some dyspnea on exertion    Current Outpatient Medications   Medication Sig Dispense Refill    aspirin (ECOTRIN) 81 MG EC tablet Take 81 mg by mouth once daily.      carvediloL (COREG) 6.25 MG tablet Take 6.25 mg by mouth 2 (two) times daily.      clopidogreL (PLAVIX) 75 mg tablet Take 75 mg by mouth once daily.      cyclobenzaprine (FLEXERIL) 10 MG tablet Take 10 mg by mouth 3 (three) times daily as needed for Muscle spasms.      dipyridamole-aspirin 200-25 mg (AGGRENOX)  mg CM12 Take by mouth.      ezetimibe (ZETIA) 10 mg tablet Take 10 mg by  mouth.      gabapentin (NEURONTIN) 100 MG capsule Take 100 mg by mouth 2 (two) times daily.      isosorbide mononitrate (IMDUR) 30 MG 24 hr tablet Take 30 mg by mouth once daily.      levocetirizine (XYZAL) 5 MG tablet Take 5 mg by mouth daily as needed for Allergies.      lisinopriL (PRINIVIL,ZESTRIL) 30 MG tablet Take 20 mg by mouth once daily.      meloxicam (MOBIC) 7.5 MG tablet Take 7.5 mg by mouth daily as needed for Pain.      nitroGLYCERIN (NITROSTAT) 0.4 MG SL tablet Place 0.4 mg under the tongue every 5 (five) minutes as needed for Chest pain.      omeprazole (PRILOSEC) 40 MG capsule Take 40 mg by mouth once daily.      ranolazine (RANEXA) 500 MG Tb12 Take 500 mg by mouth 2 (two) times daily.      rosuvastatin (CRESTOR) 20 MG tablet Take 20 mg by mouth once daily.       No current facility-administered medications for this visit.     Facility-Administered Medications Ordered in Other Visits   Medication Dose Route Frequency Provider Last Rate Last Admin    diazePAM tablet 10 mg  10 mg Oral On Call Procedure Constantin Martino MD   10 mg at 04/19/24 0729    diphenhydrAMINE capsule 50 mg  50 mg Oral On Call Procedure Constantin Martino MD   50 mg at 07/29/22 0844    diphenhydrAMINE capsule 50 mg  50 mg Oral On Call Procedure Constantin Martino MD   50 mg at 04/19/24 0730    sodium chloride 0.9% flush 10 mL  10 mL Intravenous PRN Constantin Martino MD        sodium chloride 0.9% flush 10 mL  10 mL Intravenous PRN Constantin Martino MD           Review of patient's allergies indicates:   Allergen Reactions    Codeine Itching       Past Medical History:   Diagnosis Date    Anemia, unspecified     Coronary artery disease     Hyperlipidemia     Hypertension      Past Surgical History:   Procedure Laterality Date    CARDIAC CATHETERIZATION  2016    CORONARY ANGIOGRAPHY INCLUDING BYPASS GRAFTS WITH CATHETERIZATION OF LEFT HEART Left 7/29/2022    Procedure: ANGIOGRAM, CORONARY, INCLUDING BYPASS GRAFT, WITH LEFT HEART  CATHETERIZATION;  Surgeon: Constantin Martino MD;  Location: [facility] CATH LAB;  Service: Cardiology;  Laterality: Left;    CORONARY ARTERY BYPASS GRAFT  1999    CORONARY BYPASS GRAFT ANGIOGRAPHY  4/19/2024    Procedure: Bypass graft study;  Surgeon: Constantin Martino MD;  Location: [facility] CATH LAB;  Service: Cardiology;;    HERNIA REPAIR      LEFT HEART CATHETERIZATION Left 4/19/2024    Procedure: Left heart cath;  Surgeon: Constantin Martino MD;  Location: [facility] CATH LAB;  Service: Cardiology;  Laterality: Left;  LHC +/- PCI VIA RT FEMORAL ARTERY    PERCUTANEOUS TRANSLUMINAL BALLOON ANGIOPLASTY OF BYPASS GRAFT N/A 7/29/2022    Procedure: PTA, BYPASS GRAFT, USING BALLOON;  Surgeon: Constantin Martino MD;  Location: [facility] CATH LAB;  Service: Cardiology;  Laterality: N/A;     Family History       Problem Relation (Age of Onset)    Cancer Father    Stroke Mother          Tobacco Use    Smoking status: Never    Smokeless tobacco: Never   Substance and Sexual Activity    Alcohol use: Yes     Alcohol/week: 10.0 standard drinks of alcohol     Types: 10 Cans of beer per week    Drug use: Not Currently    Sexual activity: Not on file     ROS  Objective:     Vitals:    04/25/24 0805   BP: 119/79   Pulse: 63        Weight: 124.7 kg (275 lb)  Body mass index is 39.46 kg/m².     STS Risk Score:  12    Physical Exam     Significant Labs:  Reviewed    Significant Diagnostics:  Reviewed    Assessment/Plan:     Problem List Items Addressed This Visit          Cardiac/Vascular    CAD (coronary artery disease)     Discussed with Dr. Flores.  Diffuse nature of this patient's disease, previous history of coronary artery bypass grafting, and comorbid conditions lead us to advocate high-risk PCI with Impella support as the best option for revascularization for this young man.  The patient is also very reluctant to consent for repeat surgical therapy.  Will refer to Dr. Flores for scheduling.  If trans axillary Impella is necessary, will  accommodate.          Other Visit Diagnoses       Chronic diastolic (congestive) heart failure  (Chronic)   -  Primary             Thank you for your consult. I will follow-up with patient. Please contact us if you have any additional questions.    HANG Zuleta MD, FACC, FACS  Cardiothoracic Surgery  [facility]

## 2024-05-21 DIAGNOSIS — Z79.01 ADMISSION FOR LONG-TERM (CURRENT) USE OF ANTICOAGULANTS: ICD-10-CM

## 2024-05-21 DIAGNOSIS — I25.810 CORONARY ARTERY DISEASE INVOLVING CORONARY BYPASS GRAFT OF NATIVE HEART, UNSPECIFIED WHETHER ANGINA PRESENT: Primary | ICD-10-CM

## 2024-05-21 DIAGNOSIS — Z51.81 ADMISSION FOR LONG-TERM (CURRENT) USE OF ANTICOAGULANTS: ICD-10-CM

## 2024-05-21 RX ORDER — MUPIROCIN 20 MG/G
OINTMENT TOPICAL
Status: CANCELLED | OUTPATIENT
Start: 2024-05-21 | End: 2024-05-21

## 2024-05-28 ENCOUNTER — HOSPITAL ENCOUNTER (OUTPATIENT)
Dept: RADIOLOGY | Facility: HOSPITAL | Age: 69
Discharge: HOME | End: 2024-05-28
Attending: THORACIC SURGERY (CARDIOTHORACIC VASCULAR SURGERY)
Payer: MEDICARE

## 2024-05-28 DIAGNOSIS — I25.810 CORONARY ARTERY DISEASE INVOLVING CORONARY BYPASS GRAFT OF NATIVE HEART, UNSPECIFIED WHETHER ANGINA PRESENT: ICD-10-CM

## 2024-05-28 PROCEDURE — 71046 X-RAY EXAM CHEST 2 VIEWS: CPT | Mod: TC

## 2024-05-29 ENCOUNTER — ANESTHESIA EVENT (OUTPATIENT)
Dept: SURGERY | Facility: HOSPITAL | Age: 69
DRG: 215 | End: 2024-05-29
Payer: MEDICARE

## 2024-06-05 ENCOUNTER — ANESTHESIA (OUTPATIENT)
Dept: SURGERY | Facility: HOSPITAL | Age: 69
DRG: 215 | End: 2024-06-05
Payer: MEDICARE

## 2024-06-05 ENCOUNTER — HOSPITAL ENCOUNTER (INPATIENT)
Facility: HOSPITAL | Age: 69
LOS: 5 days | Discharge: HOME HEALTH | DRG: 215 | End: 2024-06-10
Attending: THORACIC SURGERY (CARDIOTHORACIC VASCULAR SURGERY) | Admitting: INTERNAL MEDICINE
Payer: MEDICARE

## 2024-06-05 DIAGNOSIS — I25.10 CORONARY ARTERY DISEASE INVOLVING NATIVE CORONARY ARTERY OF NATIVE HEART, UNSPECIFIED WHETHER ANGINA PRESENT: ICD-10-CM

## 2024-06-05 DIAGNOSIS — I25.10 CAD (CORONARY ARTERY DISEASE): ICD-10-CM

## 2024-06-05 DIAGNOSIS — R07.9 CHEST PAIN: ICD-10-CM

## 2024-06-05 DIAGNOSIS — T82.01XD: ICD-10-CM

## 2024-06-05 DIAGNOSIS — I25.10 CORONARY ATHEROSCLEROSIS OF NATIVE CORONARY ARTERY: ICD-10-CM

## 2024-06-05 DIAGNOSIS — Z95.3 STATUS POST TRANSCATHETER AORTIC VALVE REPLACEMENT (TAVR) USING BIOPROSTHESIS: ICD-10-CM

## 2024-06-05 DIAGNOSIS — I25.810 CORONARY ARTERY DISEASE INVOLVING CORONARY BYPASS GRAFT OF NATIVE HEART, UNSPECIFIED WHETHER ANGINA PRESENT: ICD-10-CM

## 2024-06-05 DIAGNOSIS — I25.10 CORONARY ARTERY DISEASE: ICD-10-CM

## 2024-06-05 DIAGNOSIS — Z95.2 S/P TAVR (TRANSCATHETER AORTIC VALVE REPLACEMENT): ICD-10-CM

## 2024-06-05 DIAGNOSIS — M79.603 ARM PAIN: ICD-10-CM

## 2024-06-05 DIAGNOSIS — R52 PAIN: ICD-10-CM

## 2024-06-05 LAB
ALBUMIN SERPL-MCNC: 3.5 G/DL (ref 3.4–4.8)
ALBUMIN/GLOB SERPL: 1.5 RATIO (ref 1.1–2)
ALLENS TEST BLOOD GAS (OHS): NORMAL
ALP SERPL-CCNC: 52 UNIT/L (ref 40–150)
ALT SERPL-CCNC: 17 UNIT/L (ref 0–55)
ANION GAP SERPL CALC-SCNC: 10 MEQ/L
ANION GAP SERPL CALC-SCNC: 13 MEQ/L
APTT PPP: 93.8 SECONDS (ref 23.2–33.7)
AST SERPL-CCNC: 26 UNIT/L (ref 5–34)
BASE EXCESS BLD CALC-SCNC: 0.5 MMOL/L
BASE EXCESS BLD CALC-SCNC: 1 MMOL/L
BASOPHILS # BLD AUTO: 0.03 X10(3)/MCL
BASOPHILS # BLD AUTO: 0.04 X10(3)/MCL
BASOPHILS NFR BLD AUTO: 0.2 %
BASOPHILS NFR BLD AUTO: 0.3 %
BILIRUB SERPL-MCNC: 2.2 MG/DL
BLOOD GAS SAMPLE TYPE (OHS): ABNORMAL
BLOOD GAS SAMPLE TYPE (OHS): NORMAL
BUN SERPL-MCNC: 17.1 MG/DL (ref 8.4–25.7)
BUN SERPL-MCNC: 17.6 MG/DL (ref 8.4–25.7)
CA-I BLD-SCNC: 1.14 MMOL/L (ref 1.12–1.32)
CA-I BLD-SCNC: 1.15 MMOL/L (ref 1.12–1.23)
CALCIUM SERPL-MCNC: 8.5 MG/DL (ref 8.8–10)
CALCIUM SERPL-MCNC: 8.6 MG/DL (ref 8.8–10)
CHLORIDE SERPL-SCNC: 106 MMOL/L (ref 98–107)
CHLORIDE SERPL-SCNC: 106 MMOL/L (ref 98–107)
CK MB SERPL-MCNC: 3.6 NG/ML
CK SERPL-CCNC: 240 U/L (ref 30–200)
CO2 BLDA-SCNC: 24.8 MMOL/L
CO2 BLDA-SCNC: 25.7 MMOL/L
CO2 SERPL-SCNC: 18 MMOL/L (ref 23–31)
CO2 SERPL-SCNC: 21 MMOL/L (ref 23–31)
COHGB MFR BLDA: 1.7 %
COHGB MFR BLDA: 2.1 %
CREAT SERPL-MCNC: 0.73 MG/DL (ref 0.73–1.18)
CREAT SERPL-MCNC: 0.8 MG/DL (ref 0.73–1.18)
CREAT/UREA NIT SERPL: 22
CREAT/UREA NIT SERPL: 23
DRAWN BY BLOOD GAS (OHS): ABNORMAL
DRAWN BY BLOOD GAS (OHS): NORMAL
EOSINOPHIL # BLD AUTO: 0.02 X10(3)/MCL (ref 0–0.9)
EOSINOPHIL # BLD AUTO: 0.03 X10(3)/MCL (ref 0–0.9)
EOSINOPHIL NFR BLD AUTO: 0.1 %
EOSINOPHIL NFR BLD AUTO: 0.2 %
ERYTHROCYTE [DISTWIDTH] IN BLOOD BY AUTOMATED COUNT: 12.6 % (ref 11.5–17)
ERYTHROCYTE [DISTWIDTH] IN BLOOD BY AUTOMATED COUNT: 12.6 % (ref 11.5–17)
GFR SERPLBLD CREATININE-BSD FMLA CKD-EPI: >60 ML/MIN/1.73/M2
GFR SERPLBLD CREATININE-BSD FMLA CKD-EPI: >60 ML/MIN/1.73/M2
GLOBULIN SER-MCNC: 2.3 GM/DL (ref 2.4–3.5)
GLUCOSE SERPL-MCNC: 127 MG/DL (ref 70–110)
GLUCOSE SERPL-MCNC: 150 MG/DL (ref 82–115)
GLUCOSE SERPL-MCNC: 164 MG/DL (ref 82–115)
GROUP & RH: NORMAL
HCO3 BLDA-SCNC: 23.8 MMOL/L
HCO3 BLDA-SCNC: 24.6 MMOL/L
HCO3 UR-SCNC: 24.3 MMOL/L (ref 24–28)
HCT VFR BLD AUTO: 40.8 % (ref 42–52)
HCT VFR BLD AUTO: 40.8 % (ref 42–52)
HCT VFR BLD CALC: 39 %PCV (ref 36–54)
HGB BLD-MCNC: 13 G/DL
HGB BLD-MCNC: 13.7 G/DL (ref 14–18)
HGB BLD-MCNC: 13.9 G/DL (ref 14–18)
IMM GRANULOCYTES # BLD AUTO: 0.04 X10(3)/MCL (ref 0–0.04)
IMM GRANULOCYTES # BLD AUTO: 0.07 X10(3)/MCL (ref 0–0.04)
IMM GRANULOCYTES NFR BLD AUTO: 0.3 %
IMM GRANULOCYTES NFR BLD AUTO: 0.5 %
INDIRECT COOMBS: NORMAL
INR PPP: 1.2
LYMPHOCYTES # BLD AUTO: 1.77 X10(3)/MCL (ref 0.6–4.6)
LYMPHOCYTES # BLD AUTO: 2.21 X10(3)/MCL (ref 0.6–4.6)
LYMPHOCYTES NFR BLD AUTO: 11.8 %
LYMPHOCYTES NFR BLD AUTO: 15 %
MCH RBC QN AUTO: 31.9 PG (ref 27–31)
MCH RBC QN AUTO: 32 PG (ref 27–31)
MCHC RBC AUTO-ENTMCNC: 33.6 G/DL (ref 33–36)
MCHC RBC AUTO-ENTMCNC: 34.1 G/DL (ref 33–36)
MCV RBC AUTO: 93.8 FL (ref 80–94)
MCV RBC AUTO: 95.1 FL (ref 80–94)
METHGB MFR BLDA: 1.3 %
METHGB MFR BLDA: 1.3 %
MONOCYTES # BLD AUTO: 0.92 X10(3)/MCL (ref 0.1–1.3)
MONOCYTES # BLD AUTO: 0.96 X10(3)/MCL (ref 0.1–1.3)
MONOCYTES NFR BLD AUTO: 6.1 %
MONOCYTES NFR BLD AUTO: 6.5 %
NEUTROPHILS # BLD AUTO: 11.42 X10(3)/MCL (ref 2.1–9.2)
NEUTROPHILS # BLD AUTO: 12.26 X10(3)/MCL (ref 2.1–9.2)
NEUTROPHILS NFR BLD AUTO: 77.5 %
NEUTROPHILS NFR BLD AUTO: 81.5 %
NRBC BLD AUTO-RTO: 0 %
NRBC BLD AUTO-RTO: 0 %
O2 HB BLOOD GAS (OHS): 68.7 %
O2 HB BLOOD GAS (OHS): 93.6 %
OHS QRS DURATION: 100 MS
OHS QTC CALCULATION: 468 MS
OXYHGB MFR BLDA: 14.4 G/DL
OXYHGB MFR BLDA: 14.4 G/DL
PCO2 BLDA: 32 MMHG
PCO2 BLDA: 37 MMHG (ref 20–50)
PCO2 BLDA: 40.1 MMHG (ref 35–45)
PH BLDA: 7.43 [PH] (ref 7.3–7.6)
PH BLDA: 7.48 [PH]
PH SMN: 7.39 [PH] (ref 7.35–7.45)
PLATELET # BLD AUTO: 264 X10(3)/MCL (ref 130–400)
PLATELET # BLD AUTO: 276 X10(3)/MCL (ref 130–400)
PMV BLD AUTO: 10.2 FL (ref 7.4–10.4)
PMV BLD AUTO: 10.6 FL (ref 7.4–10.4)
PO2 BLDA: 149 MMHG (ref 80–100)
PO2 BLDA: 66 MMHG
PO2 BLDA: <38 MMHG
POC BE: -1 MMOL/L
POC IONIZED CALCIUM: 1.3 MMOL/L (ref 1.06–1.42)
POC SATURATED O2: 99 % (ref 95–100)
POC TCO2: 26 MMOL/L (ref 23–27)
POCT GLUCOSE: 133 MG/DL (ref 70–110)
POCT GLUCOSE: 91 MG/DL (ref 70–110)
POTASSIUM BLD-SCNC: 3.9 MMOL/L (ref 3.5–5.1)
POTASSIUM BLOOD GAS (OHS): 3.8 MMOL/L
POTASSIUM BLOOD GAS (OHS): 3.9 MMOL/L (ref 3.5–5)
POTASSIUM SERPL-SCNC: 3.9 MMOL/L (ref 3.5–5.1)
POTASSIUM SERPL-SCNC: 4 MMOL/L (ref 3.5–5.1)
PROT SERPL-MCNC: 5.8 GM/DL (ref 5.8–7.6)
PROTHROMBIN TIME: 14.5 SECONDS (ref 12.5–14.5)
RBC # BLD AUTO: 4.29 X10(6)/MCL (ref 4.7–6.1)
RBC # BLD AUTO: 4.35 X10(6)/MCL (ref 4.7–6.1)
SAMPLE SITE BLOOD GAS (OHS): NORMAL
SAMPLE: ABNORMAL
SAO2 % BLDA: 52.7 %
SAO2 % BLDA: 94.2 %
SODIUM BLD-SCNC: 136 MMOL/L (ref 136–145)
SODIUM BLOOD GAS (OHS): 132 MMOL/L
SODIUM BLOOD GAS (OHS): 134 MMOL/L (ref 137–145)
SODIUM SERPL-SCNC: 137 MMOL/L (ref 136–145)
SODIUM SERPL-SCNC: 137 MMOL/L (ref 136–145)
SPECIMEN OUTDATE: NORMAL
WBC # SPEC AUTO: 14.73 X10(3)/MCL (ref 4.5–11.5)
WBC # SPEC AUTO: 15.04 X10(3)/MCL (ref 4.5–11.5)

## 2024-06-05 PROCEDURE — 92944: CPT | Mod: LD | Performed by: INTERNAL MEDICINE

## 2024-06-05 PROCEDURE — 85730 THROMBOPLASTIN TIME PARTIAL: CPT

## 2024-06-05 PROCEDURE — 34716 OPN AX/SUBCLA ART EXPOS CNDT: CPT | Mod: AS,LT,, | Performed by: PHYSICIAN ASSISTANT

## 2024-06-05 PROCEDURE — C1751 CATH, INF, PER/CENT/MIDLINE: HCPCS | Performed by: INTERNAL MEDICINE

## 2024-06-05 PROCEDURE — 36415 COLL VENOUS BLD VENIPUNCTURE: CPT | Performed by: HOSPITALIST

## 2024-06-05 PROCEDURE — 99900035 HC TECH TIME PER 15 MIN (STAT)

## 2024-06-05 PROCEDURE — C1894 INTRO/SHEATH, NON-LASER: HCPCS | Performed by: THORACIC SURGERY (CARDIOTHORACIC VASCULAR SURGERY)

## 2024-06-05 PROCEDURE — 85025 COMPLETE CBC W/AUTO DIFF WBC: CPT | Performed by: HOSPITALIST

## 2024-06-05 PROCEDURE — 27801859 OPTIME CATHETER, IMPELLA: Performed by: THORACIC SURGERY (CARDIOTHORACIC VASCULAR SURGERY)

## 2024-06-05 PROCEDURE — 92972 PERQ TRLUML CORONRY LITHOTRP: CPT | Performed by: INTERNAL MEDICINE

## 2024-06-05 PROCEDURE — 86900 BLOOD TYPING SEROLOGIC ABO: CPT | Performed by: THORACIC SURGERY (CARDIOTHORACIC VASCULAR SURGERY)

## 2024-06-05 PROCEDURE — 37000008 HC ANESTHESIA 1ST 15 MINUTES: Performed by: THORACIC SURGERY (CARDIOTHORACIC VASCULAR SURGERY)

## 2024-06-05 PROCEDURE — 027034Z DILATION OF CORONARY ARTERY, ONE ARTERY WITH DRUG-ELUTING INTRALUMINAL DEVICE, PERCUTANEOUS APPROACH: ICD-10-PCS | Performed by: INTERNAL MEDICINE

## 2024-06-05 PROCEDURE — C1769 GUIDE WIRE: HCPCS | Performed by: THORACIC SURGERY (CARDIOTHORACIC VASCULAR SURGERY)

## 2024-06-05 PROCEDURE — 25000003 PHARM REV CODE 250: Performed by: INTERNAL MEDICINE

## 2024-06-05 PROCEDURE — 36000713 HC OR TIME LEV V EA ADD 15 MIN: Performed by: THORACIC SURGERY (CARDIOTHORACIC VASCULAR SURGERY)

## 2024-06-05 PROCEDURE — C9607 PERC D-E COR REVASC CHRO SIN: HCPCS | Mod: LD | Performed by: INTERNAL MEDICINE

## 2024-06-05 PROCEDURE — C1769 GUIDE WIRE: HCPCS | Performed by: INTERNAL MEDICINE

## 2024-06-05 PROCEDURE — 93320 DOPPLER ECHO COMPLETE: CPT | Mod: 26,,, | Performed by: ANESTHESIOLOGY

## 2024-06-05 PROCEDURE — 82803 BLOOD GASES ANY COMBINATION: CPT

## 2024-06-05 PROCEDURE — X2HX0F9 INSERTION OF CONDUIT TO SHORT-TERM EXTERNAL HEART ASSIST SYSTEM INTO THORACIC AORTA, ASCENDING, OPEN APPROACH, NEW TECHNOLOGY GROUP 9: ICD-10-PCS | Performed by: THORACIC SURGERY (CARDIOTHORACIC VASCULAR SURGERY)

## 2024-06-05 PROCEDURE — D9220A PRA ANESTHESIA: Mod: CRNA,,, | Performed by: NURSE ANESTHETIST, CERTIFIED REGISTERED

## 2024-06-05 PROCEDURE — C1874 STENT, COATED/COV W/DEL SYS: HCPCS | Performed by: INTERNAL MEDICINE

## 2024-06-05 PROCEDURE — 5A0221D ASSISTANCE WITH CARDIAC OUTPUT USING IMPELLER PUMP, CONTINUOUS: ICD-10-PCS | Performed by: THORACIC SURGERY (CARDIOTHORACIC VASCULAR SURGERY)

## 2024-06-05 PROCEDURE — 93451 RIGHT HEART CATH: CPT | Performed by: INTERNAL MEDICINE

## 2024-06-05 PROCEDURE — B2131ZZ FLUOROSCOPY OF MULTIPLE CORONARY ARTERY BYPASS GRAFTS USING LOW OSMOLAR CONTRAST: ICD-10-PCS | Performed by: INTERNAL MEDICINE

## 2024-06-05 PROCEDURE — 86923 COMPATIBILITY TEST ELECTRIC: CPT | Performed by: THORACIC SURGERY (CARDIOTHORACIC VASCULAR SURGERY)

## 2024-06-05 PROCEDURE — 80053 COMPREHEN METABOLIC PANEL: CPT | Performed by: STUDENT IN AN ORGANIZED HEALTH CARE EDUCATION/TRAINING PROGRAM

## 2024-06-05 PROCEDURE — 02HA3RZ INSERTION OF SHORT-TERM EXTERNAL HEART ASSIST SYSTEM INTO HEART, PERCUTANEOUS APPROACH: ICD-10-PCS | Performed by: THORACIC SURGERY (CARDIOTHORACIC VASCULAR SURGERY)

## 2024-06-05 PROCEDURE — 86850 RBC ANTIBODY SCREEN: CPT | Performed by: THORACIC SURGERY (CARDIOTHORACIC VASCULAR SURGERY)

## 2024-06-05 PROCEDURE — 93005 ELECTROCARDIOGRAM TRACING: CPT

## 2024-06-05 PROCEDURE — 63600175 PHARM REV CODE 636 W HCPCS: Performed by: THORACIC SURGERY (CARDIOTHORACIC VASCULAR SURGERY)

## 2024-06-05 PROCEDURE — C1760 CLOSURE DEV, VASC: HCPCS | Performed by: INTERNAL MEDICINE

## 2024-06-05 PROCEDURE — C1885 CATH, TRANSLUMIN ANGIO LASER: HCPCS | Performed by: INTERNAL MEDICINE

## 2024-06-05 PROCEDURE — 37000009 HC ANESTHESIA EA ADD 15 MINS: Performed by: THORACIC SURGERY (CARDIOTHORACIC VASCULAR SURGERY)

## 2024-06-05 PROCEDURE — 37799 UNLISTED PX VASCULAR SURGERY: CPT

## 2024-06-05 PROCEDURE — 63600175 PHARM REV CODE 636 W HCPCS: Mod: JZ,JG | Performed by: INTERNAL MEDICINE

## 2024-06-05 PROCEDURE — B2111ZZ FLUOROSCOPY OF MULTIPLE CORONARY ARTERIES USING LOW OSMOLAR CONTRAST: ICD-10-PCS | Performed by: INTERNAL MEDICINE

## 2024-06-05 PROCEDURE — 85025 COMPLETE CBC W/AUTO DIFF WBC: CPT | Performed by: THORACIC SURGERY (CARDIOTHORACIC VASCULAR SURGERY)

## 2024-06-05 PROCEDURE — 27201423 OPTIME MED/SURG SUP & DEVICES STERILE SUPPLY: Performed by: INTERNAL MEDICINE

## 2024-06-05 PROCEDURE — D9220A PRA ANESTHESIA: Mod: ANES,,, | Performed by: ANESTHESIOLOGY

## 2024-06-05 PROCEDURE — 93325 DOPPLER ECHO COLOR FLOW MAPG: CPT | Mod: 26,,, | Performed by: ANESTHESIOLOGY

## 2024-06-05 PROCEDURE — 82553 CREATINE MB FRACTION: CPT

## 2024-06-05 PROCEDURE — 85610 PROTHROMBIN TIME: CPT

## 2024-06-05 PROCEDURE — 34716 OPN AX/SUBCLA ART EXPOS CNDT: CPT | Mod: LT,,, | Performed by: THORACIC SURGERY (CARDIOTHORACIC VASCULAR SURGERY)

## 2024-06-05 PROCEDURE — C1887 CATHETER, GUIDING: HCPCS | Performed by: INTERNAL MEDICINE

## 2024-06-05 PROCEDURE — 02C03ZZ EXTIRPATION OF MATTER FROM CORONARY ARTERY, ONE ARTERY, PERCUTANEOUS APPROACH: ICD-10-PCS | Performed by: INTERNAL MEDICINE

## 2024-06-05 PROCEDURE — 82962 GLUCOSE BLOOD TEST: CPT | Performed by: THORACIC SURGERY (CARDIOTHORACIC VASCULAR SURGERY)

## 2024-06-05 PROCEDURE — 25000003 PHARM REV CODE 250: Performed by: PHYSICIAN ASSISTANT

## 2024-06-05 PROCEDURE — X2HM0F9 INSERTION OF CONDUIT TO SHORT-TERM EXTERNAL HEART ASSIST SYSTEM INTO LEFT AXILLARY ARTERY, OPEN APPROACH, NEW TECHNOLOGY GROUP 9: ICD-10-PCS | Performed by: THORACIC SURGERY (CARDIOTHORACIC VASCULAR SURGERY)

## 2024-06-05 PROCEDURE — 93312 ECHO TRANSESOPHAGEAL: CPT | Mod: 26,59,, | Performed by: ANESTHESIOLOGY

## 2024-06-05 PROCEDURE — B2181ZZ FLUOROSCOPY OF LEFT INTERNAL MAMMARY BYPASS GRAFT USING LOW OSMOLAR CONTRAST: ICD-10-PCS | Performed by: INTERNAL MEDICINE

## 2024-06-05 PROCEDURE — 93010 ELECTROCARDIOGRAM REPORT: CPT | Mod: ,,, | Performed by: INTERNAL MEDICINE

## 2024-06-05 PROCEDURE — 99223 1ST HOSP IP/OBS HIGH 75: CPT | Mod: ,,, | Performed by: PHYSICIAN ASSISTANT

## 2024-06-05 PROCEDURE — 33990 INSJ PERQ VAD L HRT ARTERIAL: CPT | Mod: ,,, | Performed by: THORACIC SURGERY (CARDIOTHORACIC VASCULAR SURGERY)

## 2024-06-05 PROCEDURE — 36620 INSERTION CATHETER ARTERY: CPT | Performed by: ANESTHESIOLOGY

## 2024-06-05 PROCEDURE — 27201423 OPTIME MED/SURG SUP & DEVICES STERILE SUPPLY: Performed by: THORACIC SURGERY (CARDIOTHORACIC VASCULAR SURGERY)

## 2024-06-05 PROCEDURE — 25000003 PHARM REV CODE 250: Performed by: THORACIC SURGERY (CARDIOTHORACIC VASCULAR SURGERY)

## 2024-06-05 PROCEDURE — 82550 ASSAY OF CK (CPK): CPT

## 2024-06-05 PROCEDURE — 25000003 PHARM REV CODE 250: Performed by: STUDENT IN AN ORGANIZED HEALTH CARE EDUCATION/TRAINING PROGRAM

## 2024-06-05 PROCEDURE — 63600175 PHARM REV CODE 636 W HCPCS: Performed by: HOSPITALIST

## 2024-06-05 PROCEDURE — 36415 COLL VENOUS BLD VENIPUNCTURE: CPT | Performed by: THORACIC SURGERY (CARDIOTHORACIC VASCULAR SURGERY)

## 2024-06-05 PROCEDURE — 4A023N6 MEASUREMENT OF CARDIAC SAMPLING AND PRESSURE, RIGHT HEART, PERCUTANEOUS APPROACH: ICD-10-PCS | Performed by: INTERNAL MEDICINE

## 2024-06-05 PROCEDURE — C1761 OPTIME CATH, TRANSLUMINAL INTRAVASC LITHO, CORONARY: HCPCS | Performed by: INTERNAL MEDICINE

## 2024-06-05 PROCEDURE — 25000003 PHARM REV CODE 250: Performed by: NURSE ANESTHETIST, CERTIFIED REGISTERED

## 2024-06-05 PROCEDURE — 63600175 PHARM REV CODE 636 W HCPCS: Performed by: NURSE ANESTHETIST, CERTIFIED REGISTERED

## 2024-06-05 PROCEDURE — 92978 ENDOLUMINL IVUS OCT C 1ST: CPT | Mod: LD | Performed by: INTERNAL MEDICINE

## 2024-06-05 PROCEDURE — 02703ZZ DILATION OF CORONARY ARTERY, ONE ARTERY, PERCUTANEOUS APPROACH: ICD-10-PCS | Performed by: INTERNAL MEDICINE

## 2024-06-05 PROCEDURE — 20000000 HC ICU ROOM

## 2024-06-05 PROCEDURE — 33990 INSJ PERQ VAD L HRT ARTERIAL: CPT | Mod: AS,,, | Performed by: PHYSICIAN ASSISTANT

## 2024-06-05 PROCEDURE — L8670 VASCULAR GRAFT, SYNTHETIC: HCPCS | Performed by: THORACIC SURGERY (CARDIOTHORACIC VASCULAR SURGERY)

## 2024-06-05 PROCEDURE — C1725 CATH, TRANSLUMIN NON-LASER: HCPCS | Performed by: INTERNAL MEDICINE

## 2024-06-05 PROCEDURE — C1894 INTRO/SHEATH, NON-LASER: HCPCS | Performed by: INTERNAL MEDICINE

## 2024-06-05 PROCEDURE — 02F13ZZ FRAGMENTATION IN CORONARY ARTERY, TWO ARTERIES, PERCUTANEOUS APPROACH: ICD-10-PCS | Performed by: INTERNAL MEDICINE

## 2024-06-05 PROCEDURE — 63600175 PHARM REV CODE 636 W HCPCS: Performed by: INTERNAL MEDICINE

## 2024-06-05 PROCEDURE — 25500020 PHARM REV CODE 255: Performed by: INTERNAL MEDICINE

## 2024-06-05 PROCEDURE — C1753 CATH, INTRAVAS ULTRASOUND: HCPCS | Performed by: INTERNAL MEDICINE

## 2024-06-05 PROCEDURE — 36000712 HC OR TIME LEV V 1ST 15 MIN: Performed by: THORACIC SURGERY (CARDIOTHORACIC VASCULAR SURGERY)

## 2024-06-05 PROCEDURE — B240ZZ3 ULTRASONOGRAPHY OF SINGLE CORONARY ARTERY, INTRAVASCULAR: ICD-10-PCS | Performed by: INTERNAL MEDICINE

## 2024-06-05 DEVICE — KIT ANGIO SEAL 6FR VIP: Type: IMPLANTABLE DEVICE | Site: GROIN | Status: FUNCTIONAL

## 2024-06-05 DEVICE — GRAFT GELWEAVE WOVEN 10MM 30CM: Type: IMPLANTABLE DEVICE | Site: SUBCLAVIAN | Status: FUNCTIONAL

## 2024-06-05 DEVICE — EVEROLIMUS-ELUTING PLATINUM CHROMIUM CORONARY STENT SYSTEM
Type: IMPLANTABLE DEVICE | Site: CHEST | Status: FUNCTIONAL
Brand: SYNERGY™ XD

## 2024-06-05 RX ORDER — HEPARIN SODIUM 5000 [USP'U]/ML
INJECTION, SOLUTION INTRAVENOUS; SUBCUTANEOUS
Status: DISCONTINUED | OUTPATIENT
Start: 2024-06-05 | End: 2024-06-05 | Stop reason: HOSPADM

## 2024-06-05 RX ORDER — ONDANSETRON HYDROCHLORIDE 2 MG/ML
INJECTION, SOLUTION INTRAVENOUS
Status: DISCONTINUED | OUTPATIENT
Start: 2024-06-05 | End: 2024-06-05

## 2024-06-05 RX ORDER — MUPIROCIN 20 MG/G
OINTMENT TOPICAL
Status: COMPLETED | OUTPATIENT
Start: 2024-06-05 | End: 2024-06-05

## 2024-06-05 RX ORDER — LIDOCAINE HYDROCHLORIDE 20 MG/ML
INJECTION, SOLUTION EPIDURAL; INFILTRATION; INTRACAUDAL; PERINEURAL
Status: DISCONTINUED | OUTPATIENT
Start: 2024-06-05 | End: 2024-06-05

## 2024-06-05 RX ORDER — MORPHINE SULFATE 4 MG/ML
2 INJECTION, SOLUTION INTRAMUSCULAR; INTRAVENOUS EVERY 4 HOURS PRN
Status: DISCONTINUED | OUTPATIENT
Start: 2024-06-05 | End: 2024-06-10 | Stop reason: HOSPADM

## 2024-06-05 RX ORDER — NOREPINEPHRINE BITARTRATE/D5W 8 MG/250ML
PLASTIC BAG, INJECTION (ML) INTRAVENOUS
Status: DISPENSED
Start: 2024-06-05 | End: 2024-06-06

## 2024-06-05 RX ORDER — DEXAMETHASONE SODIUM PHOSPHATE 4 MG/ML
INJECTION, SOLUTION INTRA-ARTICULAR; INTRALESIONAL; INTRAMUSCULAR; INTRAVENOUS; SOFT TISSUE
Status: DISCONTINUED | OUTPATIENT
Start: 2024-06-05 | End: 2024-06-05

## 2024-06-05 RX ORDER — NITROGLYCERIN 20 MG/100ML
INJECTION INTRAVENOUS
Status: DISCONTINUED | OUTPATIENT
Start: 2024-06-05 | End: 2024-06-05 | Stop reason: HOSPADM

## 2024-06-05 RX ORDER — EPHEDRINE SULFATE 50 MG/ML
INJECTION, SOLUTION INTRAVENOUS
Status: DISCONTINUED | OUTPATIENT
Start: 2024-06-05 | End: 2024-06-05

## 2024-06-05 RX ORDER — ETOMIDATE 2 MG/ML
INJECTION INTRAVENOUS
Status: DISCONTINUED | OUTPATIENT
Start: 2024-06-05 | End: 2024-06-05

## 2024-06-05 RX ORDER — GLYCOPYRROLATE 0.2 MG/ML
INJECTION INTRAMUSCULAR; INTRAVENOUS
Status: DISCONTINUED | OUTPATIENT
Start: 2024-06-05 | End: 2024-06-05

## 2024-06-05 RX ORDER — ONDANSETRON HYDROCHLORIDE 2 MG/ML
4 INJECTION, SOLUTION INTRAVENOUS DAILY PRN
Status: CANCELLED | OUTPATIENT
Start: 2024-06-05

## 2024-06-05 RX ORDER — ACETAMINOPHEN 10 MG/ML
INJECTION, SOLUTION INTRAVENOUS
Status: DISCONTINUED | OUTPATIENT
Start: 2024-06-05 | End: 2024-06-05

## 2024-06-05 RX ORDER — LIDOCAINE HYDROCHLORIDE 10 MG/ML
INJECTION INFILTRATION; PERINEURAL
Status: DISCONTINUED | OUTPATIENT
Start: 2024-06-05 | End: 2024-06-05 | Stop reason: HOSPADM

## 2024-06-05 RX ORDER — ASPIRIN 81 MG/1
81 TABLET ORAL DAILY
Status: DISCONTINUED | OUTPATIENT
Start: 2024-06-05 | End: 2024-06-10 | Stop reason: HOSPADM

## 2024-06-05 RX ORDER — ONDANSETRON 4 MG/1
8 TABLET, ORALLY DISINTEGRATING ORAL EVERY 8 HOURS PRN
Status: DISCONTINUED | OUTPATIENT
Start: 2024-06-05 | End: 2024-06-10 | Stop reason: HOSPADM

## 2024-06-05 RX ORDER — GABAPENTIN 100 MG/1
100 CAPSULE ORAL 2 TIMES DAILY
Status: DISCONTINUED | OUTPATIENT
Start: 2024-06-05 | End: 2024-06-10 | Stop reason: HOSPADM

## 2024-06-05 RX ORDER — CARVEDILOL 3.12 MG/1
6.25 TABLET ORAL 2 TIMES DAILY
Status: DISCONTINUED | OUTPATIENT
Start: 2024-06-05 | End: 2024-06-10 | Stop reason: HOSPADM

## 2024-06-05 RX ORDER — CALCIUM CHLORIDE INJECTION 100 MG/ML
INJECTION, SOLUTION INTRAVENOUS
Status: DISCONTINUED | OUTPATIENT
Start: 2024-06-05 | End: 2024-06-05

## 2024-06-05 RX ORDER — MIDAZOLAM HYDROCHLORIDE 1 MG/ML
INJECTION INTRAMUSCULAR; INTRAVENOUS
Status: DISCONTINUED | OUTPATIENT
Start: 2024-06-05 | End: 2024-06-05

## 2024-06-05 RX ORDER — HEPARIN SODIUM 10000 [USP'U]/100ML
3 INJECTION, SOLUTION INTRAVENOUS CONTINUOUS
Status: DISCONTINUED | OUTPATIENT
Start: 2024-06-05 | End: 2024-06-10

## 2024-06-05 RX ORDER — SODIUM CHLORIDE 9 MG/ML
INJECTION, SOLUTION INTRAVENOUS CONTINUOUS
Status: ACTIVE | OUTPATIENT
Start: 2024-06-05 | End: 2024-06-05

## 2024-06-05 RX ORDER — ROCURONIUM BROMIDE 10 MG/ML
INJECTION, SOLUTION INTRAVENOUS
Status: DISCONTINUED | OUTPATIENT
Start: 2024-06-05 | End: 2024-06-05

## 2024-06-05 RX ORDER — HALOPERIDOL 5 MG/ML
0.5 INJECTION INTRAMUSCULAR EVERY 10 MIN PRN
Status: CANCELLED | OUTPATIENT
Start: 2024-06-05

## 2024-06-05 RX ORDER — DICLOFENAC SODIUM 10 MG/G
2 GEL TOPICAL DAILY PRN
Status: DISCONTINUED | OUTPATIENT
Start: 2024-06-05 | End: 2024-06-10 | Stop reason: HOSPADM

## 2024-06-05 RX ORDER — HYDROCODONE BITARTRATE AND ACETAMINOPHEN 500; 5 MG/1; MG/1
TABLET ORAL
Status: DISCONTINUED | OUTPATIENT
Start: 2024-06-05 | End: 2024-06-10 | Stop reason: HOSPADM

## 2024-06-05 RX ORDER — SUCCINYLCHOLINE CHLORIDE 20 MG/ML
INJECTION INTRAMUSCULAR; INTRAVENOUS
Status: DISCONTINUED | OUTPATIENT
Start: 2024-06-05 | End: 2024-06-05

## 2024-06-05 RX ORDER — HYDROMORPHONE HYDROCHLORIDE 2 MG/ML
INJECTION, SOLUTION INTRAMUSCULAR; INTRAVENOUS; SUBCUTANEOUS EVERY 6 HOURS PRN
Status: CANCELLED | OUTPATIENT
Start: 2024-06-05

## 2024-06-05 RX ORDER — HYDRALAZINE HYDROCHLORIDE 20 MG/ML
10 INJECTION INTRAMUSCULAR; INTRAVENOUS EVERY 4 HOURS PRN
Status: DISCONTINUED | OUTPATIENT
Start: 2024-06-05 | End: 2024-06-10 | Stop reason: HOSPADM

## 2024-06-05 RX ORDER — HYDROMORPHONE HYDROCHLORIDE 2 MG/ML
INJECTION, SOLUTION INTRAMUSCULAR; INTRAVENOUS; SUBCUTANEOUS
Status: DISCONTINUED | OUTPATIENT
Start: 2024-06-05 | End: 2024-06-05

## 2024-06-05 RX ORDER — HEPARIN SODIUM 1000 [USP'U]/ML
INJECTION, SOLUTION INTRAVENOUS; SUBCUTANEOUS
Status: DISCONTINUED | OUTPATIENT
Start: 2024-06-05 | End: 2024-06-05

## 2024-06-05 RX ORDER — PROPOFOL 10 MG/ML
VIAL (ML) INTRAVENOUS
Status: DISCONTINUED | OUTPATIENT
Start: 2024-06-05 | End: 2024-06-05

## 2024-06-05 RX ORDER — PHENYLEPHRINE HYDROCHLORIDE 10 MG/ML
INJECTION INTRAVENOUS
Status: DISCONTINUED | OUTPATIENT
Start: 2024-06-05 | End: 2024-06-05

## 2024-06-05 RX ORDER — FENTANYL CITRATE 50 UG/ML
INJECTION, SOLUTION INTRAMUSCULAR; INTRAVENOUS
Status: DISCONTINUED | OUTPATIENT
Start: 2024-06-05 | End: 2024-06-05

## 2024-06-05 RX ORDER — ACETAMINOPHEN 325 MG/1
650 TABLET ORAL EVERY 4 HOURS PRN
Status: DISCONTINUED | OUTPATIENT
Start: 2024-06-05 | End: 2024-06-10 | Stop reason: HOSPADM

## 2024-06-05 RX ORDER — CLOPIDOGREL BISULFATE 75 MG/1
75 TABLET ORAL DAILY
Status: DISCONTINUED | OUTPATIENT
Start: 2024-06-05 | End: 2024-06-10 | Stop reason: HOSPADM

## 2024-06-05 RX ORDER — DEXTROSE MONOHYDRATE 50 MG/ML
INJECTION, SOLUTION INTRAVENOUS CONTINUOUS
Status: DISCONTINUED | OUTPATIENT
Start: 2024-06-05 | End: 2024-06-05

## 2024-06-05 RX ADMIN — PROPOFOL 50 MG: 10 INJECTION, EMULSION INTRAVENOUS at 07:06

## 2024-06-05 RX ADMIN — CARVEDILOL 6.25 MG: 3.12 TABLET, FILM COATED ORAL at 08:06

## 2024-06-05 RX ADMIN — HEPARIN SODIUM 2000 UNITS: 1000 INJECTION, SOLUTION INTRAVENOUS; SUBCUTANEOUS at 11:06

## 2024-06-05 RX ADMIN — PHENYLEPHRINE HYDROCHLORIDE 200 MCG: 10 INJECTION INTRAVENOUS at 08:06

## 2024-06-05 RX ADMIN — ROCURONIUM BROMIDE 50 MG: 10 SOLUTION INTRAVENOUS at 09:06

## 2024-06-05 RX ADMIN — EPHEDRINE SULFATE 20 MG: 50 INJECTION INTRAVENOUS at 10:06

## 2024-06-05 RX ADMIN — HEPARIN SODIUM 3000 UNITS: 1000 INJECTION, SOLUTION INTRAVENOUS; SUBCUTANEOUS at 01:06

## 2024-06-05 RX ADMIN — PHENYLEPHRINE HYDROCHLORIDE 200 MCG: 10 INJECTION INTRAVENOUS at 10:06

## 2024-06-05 RX ADMIN — SODIUM CHLORIDE, SODIUM GLUCONATE, SODIUM ACETATE, POTASSIUM CHLORIDE AND MAGNESIUM CHLORIDE: 526; 502; 368; 37; 30 INJECTION, SOLUTION INTRAVENOUS at 02:06

## 2024-06-05 RX ADMIN — LIDOCAINE HYDROCHLORIDE 4 ML: 20 INJECTION, SOLUTION INTRAVENOUS at 07:06

## 2024-06-05 RX ADMIN — ONDANSETRON 4 MG: 2 INJECTION INTRAMUSCULAR; INTRAVENOUS at 07:06

## 2024-06-05 RX ADMIN — FENTANYL CITRATE 100 MCG: 50 INJECTION, SOLUTION INTRAMUSCULAR; INTRAVENOUS at 07:06

## 2024-06-05 RX ADMIN — PHENYLEPHRINE HYDROCHLORIDE 200 MCG: 10 INJECTION INTRAVENOUS at 12:06

## 2024-06-05 RX ADMIN — PHENYLEPHRINE HYDROCHLORIDE 100 MCG: 10 INJECTION INTRAVENOUS at 08:06

## 2024-06-05 RX ADMIN — CALCIUM CHLORIDE INJECTION 0.5 G: 100 INJECTION, SOLUTION INTRAVENOUS at 12:06

## 2024-06-05 RX ADMIN — ROCURONIUM BROMIDE 50 MG: 10 SOLUTION INTRAVENOUS at 07:06

## 2024-06-05 RX ADMIN — SODIUM CHLORIDE, SODIUM GLUCONATE, SODIUM ACETATE, POTASSIUM CHLORIDE AND MAGNESIUM CHLORIDE: 526; 502; 368; 37; 30 INJECTION, SOLUTION INTRAVENOUS at 07:06

## 2024-06-05 RX ADMIN — MORPHINE SULFATE 2 MG: 4 INJECTION INTRAVENOUS at 04:06

## 2024-06-05 RX ADMIN — HEPARIN SODIUM 3000 UNITS: 1000 INJECTION, SOLUTION INTRAVENOUS; SUBCUTANEOUS at 12:06

## 2024-06-05 RX ADMIN — MUPIROCIN: 20 OINTMENT TOPICAL at 05:06

## 2024-06-05 RX ADMIN — SODIUM CHLORIDE: 9 INJECTION, SOLUTION INTRAVENOUS at 04:06

## 2024-06-05 RX ADMIN — PHENYLEPHRINE HYDROCHLORIDE 200 MCG: 10 INJECTION INTRAVENOUS at 07:06

## 2024-06-05 RX ADMIN — HEPARIN SODIUM 10000 UNITS: 1000 INJECTION, SOLUTION INTRAVENOUS; SUBCUTANEOUS at 08:06

## 2024-06-05 RX ADMIN — ROCURONIUM BROMIDE 50 MG: 10 SOLUTION INTRAVENOUS at 01:06

## 2024-06-05 RX ADMIN — HEPARIN SODIUM 10000 UNITS: 1000 INJECTION, SOLUTION INTRAVENOUS; SUBCUTANEOUS at 10:06

## 2024-06-05 RX ADMIN — EPHEDRINE SULFATE 10 MG: 50 INJECTION INTRAVENOUS at 08:06

## 2024-06-05 RX ADMIN — ROCURONIUM BROMIDE 40 MG: 10 SOLUTION INTRAVENOUS at 07:06

## 2024-06-05 RX ADMIN — HYDROMORPHONE HYDROCHLORIDE 1 MG: 2 INJECTION, SOLUTION INTRAMUSCULAR; INTRAVENOUS; SUBCUTANEOUS at 07:06

## 2024-06-05 RX ADMIN — EPHEDRINE SULFATE 20 MG: 50 INJECTION INTRAVENOUS at 09:06

## 2024-06-05 RX ADMIN — GABAPENTIN 100 MG: 100 CAPSULE ORAL at 08:06

## 2024-06-05 RX ADMIN — ETOMIDATE 15 MG: 2 INJECTION INTRAVENOUS at 07:06

## 2024-06-05 RX ADMIN — MORPHINE SULFATE 2 MG: 4 INJECTION INTRAVENOUS at 07:06

## 2024-06-05 RX ADMIN — DICLOFENAC SODIUM 2 G: 10 GEL TOPICAL at 08:06

## 2024-06-05 RX ADMIN — DEXTROSE MONOHYDRATE 1.5 G: 5 INJECTION INTRAVENOUS at 07:06

## 2024-06-05 RX ADMIN — CLOPIDOGREL BISULFATE 75 MG: 75 TABLET ORAL at 04:06

## 2024-06-05 RX ADMIN — HYDROMORPHONE HYDROCHLORIDE 0.5 MG: 2 INJECTION, SOLUTION INTRAMUSCULAR; INTRAVENOUS; SUBCUTANEOUS at 02:06

## 2024-06-05 RX ADMIN — ROCURONIUM BROMIDE 50 MG: 10 SOLUTION INTRAVENOUS at 08:06

## 2024-06-05 RX ADMIN — GLYCOPYRROLATE 0.2 MG: 0.2 INJECTION INTRAMUSCULAR; INTRAVENOUS at 07:06

## 2024-06-05 RX ADMIN — HYDROMORPHONE HYDROCHLORIDE 0.5 MG: 2 INJECTION, SOLUTION INTRAMUSCULAR; INTRAVENOUS; SUBCUTANEOUS at 01:06

## 2024-06-05 RX ADMIN — ROCURONIUM BROMIDE 10 MG: 10 SOLUTION INTRAVENOUS at 07:06

## 2024-06-05 RX ADMIN — HYDRALAZINE HYDROCHLORIDE 10 MG: 20 INJECTION, SOLUTION INTRAMUSCULAR; INTRAVENOUS at 03:06

## 2024-06-05 RX ADMIN — ACETAMINOPHEN 1000 MG: 10 INJECTION, SOLUTION INTRAVENOUS at 08:06

## 2024-06-05 RX ADMIN — CALCIUM CHLORIDE INJECTION 0.5 G: 100 INJECTION, SOLUTION INTRAVENOUS at 01:06

## 2024-06-05 RX ADMIN — DEXAMETHASONE SODIUM PHOSPHATE 4 MG: 4 INJECTION, SOLUTION INTRA-ARTICULAR; INTRALESIONAL; INTRAMUSCULAR; INTRAVENOUS; SOFT TISSUE at 07:06

## 2024-06-05 RX ADMIN — SUGAMMADEX 400 MG: 100 INJECTION, SOLUTION INTRAVENOUS at 02:06

## 2024-06-05 RX ADMIN — SUCCINYLCHOLINE CHLORIDE 160 MG: 20 INJECTION, SOLUTION INTRAMUSCULAR; INTRAVENOUS at 07:06

## 2024-06-05 RX ADMIN — ACETAMINOPHEN 650 MG: 325 TABLET, FILM COATED ORAL at 08:06

## 2024-06-05 RX ADMIN — PHENYLEPHRINE HYDROCHLORIDE 200 MCG: 10 INJECTION INTRAVENOUS at 01:06

## 2024-06-05 RX ADMIN — HYDRALAZINE HYDROCHLORIDE 10 MG: 20 INJECTION, SOLUTION INTRAMUSCULAR; INTRAVENOUS at 10:06

## 2024-06-05 RX ADMIN — EPHEDRINE SULFATE 10 MG: 50 INJECTION INTRAVENOUS at 10:06

## 2024-06-05 RX ADMIN — SODIUM CHLORIDE, SODIUM GLUCONATE, SODIUM ACETATE, POTASSIUM CHLORIDE AND MAGNESIUM CHLORIDE: 526; 502; 368; 37; 30 INJECTION, SOLUTION INTRAVENOUS at 10:06

## 2024-06-05 RX ADMIN — ASPIRIN 81 MG: 81 TABLET, COATED ORAL at 04:06

## 2024-06-05 NOTE — ANESTHESIA PROCEDURE NOTES
DI    Diagnosis: Coronary artery disease  Patient location during procedure: OR  Timeout: 6/5/2024 7:10 AM  Procedure end time: 6/5/2024 9:00 AM  Surgery related to: Impella placement for high risk PCI  Exam type: Baseline    Staffing    Anesthesiologist: Guille Mccall MD        Anesthesiologist Present  Yes      Setup & Induction  Patient preparation: bite block inserted  Probe Insertion: easy  Exam: limitedDoppler Echo: 2D, pulse wave Doppler and color flow mapping.  Exam     Left Heart  Left Atruim: mildly abnormal (men 4.1-4.6; women 3.9-4.2) and dilated and 4.1    Left Ventricle: 4 cm, normal (men 4.2-5.9; women 3.8-5.2)  LV Wall Thickness (posterior wall):cm, mildly abnormal (men 1.1-1.3 cm; women 1.0-1.2 cm)    LVAD:yes (Impella 4.35 cm past Aortic valve)  Estimated Ejection Fraction: 35-44% moderate  Regional Wall Abnormalities: RWMA present    Regional Wall Abnormalities    Four Chamber ME   Basal inferoseptal: 1   Mid inferoseptal: 2  Apical inferoseptal: 2  Mid anteriolateral: 2  Apical anteriolateral: 2  Basal anteriolateral: 1  Two Chamber ME   Basal inferior: 1  Mid inferior: 2  Apical inferior: 2  Apical anterior: 2  Mid anterior: 2  Basal anterior: 1  Longitudinal ME  TG Transversal MP  TG Transversal Basal        Right Heart  Right Ventricle: normal  Right Ventricle Function: normal  Right Atria:  normal    Intra Atrial Septum  PFO: no shunt by color flow doppler          Right Ventricle  Size: normal, Free Wall Thickness: normal    Aortic Valve:  Stenosis: none.  Morphology: trileaflet    Regurgitation: no aortic valve regurgitation      Mitral Valve:   Morphology:normal  Jet Description: mild and centrally-directed    Tricuspid Valve:  Morphology: normal  Regurgitation: mild    Pulmonic Valve:  Morphology:normal    Great Vessels  Ascending Aorta Atherosclerosis: 2=mild dz (<3mm)  Descending Aorta Atherosclerosis: 1=nl-min dz      Effusions none    SummaryFindings discussed with  surgeon.    Other Findings   Left ventricular function slightly improved after Impella placement

## 2024-06-05 NOTE — Clinical Note
The catheter was removed from the and insertion attempt was made into the ostium   right coronary artery. The catheter was unable to engage the area..

## 2024-06-05 NOTE — ANESTHESIA PROCEDURE NOTES
Arterial    Diagnosis: Coronary artery disease  Doctor requesting consult: Song    Patient location during procedure: pre-op  Timeout: 6/5/2024 6:48 AM  Procedure end time: 6/5/2024 6:55 AM    Staffing  Authorizing Provider: Guille Mccall MD  Performing Provider: Guille Mccall MD    Staffing  Performed by: Guille Mccall MD  Authorized by: Guille Mccall MD    Anesthesiologist was present at the time of the procedure.    Preanesthetic Checklist  Completed: patient identified, IV checked, site marked, risks and benefits discussed, surgical consent, monitors and equipment checked, pre-op evaluation, timeout performed and anesthesia consent givenArterial  Skin Prep: chlorhexidine gluconate  Local Infiltration: lidocaine  Orientation: right  Location: radial    Catheter Size: 20 G  Catheter placement by Ultrasound guidance. Heme positive aspiration all ports.   Vessel Caliber: medium, patent, compressibility normal  Needle advanced into vessel with real time Ultrasound guidance.  Guidewire confirmed in vessel.Insertion Attempts: 1  Assessment  Dressing: secured with tape and tegaderm  Patient: Tolerated well

## 2024-06-05 NOTE — Clinical Note
An angiography was performed of the graft. The angiography was performed via power injection.  LIMA-LAD graft

## 2024-06-05 NOTE — Clinical Note
Patient is calling regarding cancelling an appointment      Date/Time: 05/08/2023 @8:00am    Patient was rescheduled: YES [] NO [x]    Patient requesting call back to reschedule: YES [x] NO [] Phone report was given to SHARLA Burr

## 2024-06-05 NOTE — BRIEF OP NOTE
[facility]  Cardiothoracic Surgery  Operative Note    SUMMARY     Date of Procedure: 6/5/2024     Procedure: Procedure(s) (LRB):  INSERTION, IMPELLA (N/A)    Surgeons and Role:     * Lon Zuleta MD - Primary    Assistant: Almas Vaz PA-C    Pre-Operative Diagnosis: Coronary artery disease involving coronary bypass graft of native heart, unspecified whether angina present [I25.810]    Post-Operative Diagnosis: Post-Op Diagnosis Codes:     * Coronary artery disease involving coronary bypass graft of native heart, unspecified whether angina present [I25.810]    Anesthesia: General    Operative Findings (including complications, if any):            Specimens:   Specimen (24h ago, onward)      None                    Condition: Good    Disposition: PACU - hemodynamically stable.

## 2024-06-05 NOTE — Clinical Note
The catheter insertion attempt was made into the ostium   right coronary artery. RETROGRADE APPROACH

## 2024-06-05 NOTE — H&P
[facility] - [facility] ICU  Pulmonary Critical Care Note      Patient Name: Carlos Waller  MRN: [id number]  Admission Date: 6/5/2024  Hospital Length of Stay: 0 days  Code Status: No Order  Attending Provider: Brant Flores MD  Primary Care Provider: Demetrius March RN     Subjective:     HPI: Carlos Waller is a 68-year-old male with past medical history of CAD status post CABG x3 in 1999, hypertension, hyperlipidemia, arthritis, obesity was admitted to ICU after Impella placement for high-risk PCI today on  06/05/2024. Patient was extubated the PACU and is on 10 L OxyMask satting well at this time.  On my examination, patient is alert and responding appropriately.  Bleeding noted on the left axillary Impella site.     Hospital Course/Significant events:  Impella placement on 06/05/2024  24 Hour Interval History:     Past Medical History:   Diagnosis Date    Anemia, unspecified     Arthritis     Chest pain     Coronary artery disease     Coronary artery disease involving coronary bypass graft of native heart, unspecified whether angina present     Hyperlipidemia     Hypertension     Obesity     Right shoulder pain     Sleep apnea     Stroke     left side numbness       Past Surgical History:   Procedure Laterality Date    CARDIAC CATHETERIZATION  2016    COLONOSCOPY W/ POLYPECTOMY      CORONARY ANGIOGRAPHY INCLUDING BYPASS GRAFTS WITH CATHETERIZATION OF LEFT HEART Left 07/29/2022    Procedure: ANGIOGRAM, CORONARY, INCLUDING BYPASS GRAFT, WITH LEFT HEART CATHETERIZATION;  Surgeon: Constantin Martino MD;  Location: [facility] CATH LAB;  Service: Cardiology;  Laterality: Left;    CORONARY ARTERY BYPASS GRAFT  1999    CORONARY BYPASS GRAFT ANGIOGRAPHY  04/19/2024    Procedure: Bypass graft study;  Surgeon: Constantin Martino MD;  Location: [facility] CATH LAB;  Service: Cardiology;;    CORONARY STENT PLACEMENT      x2    HERNIA REPAIR      failed    LEFT HEART CATHETERIZATION Left 04/19/2024    Procedure: Left heart  cath;  Surgeon: Constantin Martino MD;  Location: [facility] CATH LAB;  Service: Cardiology;  Laterality: Left;  LHC +/- PCI VIA RT FEMORAL ARTERY    PERCUTANEOUS TRANSLUMINAL BALLOON ANGIOPLASTY OF BYPASS GRAFT N/A 07/29/2022    Procedure: PTA, BYPASS GRAFT, USING BALLOON;  Surgeon: Constantin Martino MD;  Location: [facility] CATH LAB;  Service: Cardiology;  Laterality: N/A;       Review of patient's allergies indicates:   Allergen Reactions    Codeine Itching       Current Outpatient Medications   Medication Instructions    aspirin (ECOTRIN) 81 mg, Oral, Daily    carvediloL (COREG) 6.25 mg, Oral, 2 times daily    clopidogreL (PLAVIX) 75 mg, Oral, Daily    cyclobenzaprine (FLEXERIL) 10 mg, Oral, 3 times daily PRN    gabapentin (NEURONTIN) 100 mg, Oral, 2 times daily    isosorbide mononitrate (IMDUR) 30 mg, Oral, Daily    levocetirizine (XYZAL) 5 mg, Oral, Daily PRN    lisinopriL (PRINIVIL,ZESTRIL) 20 mg, Oral, Daily    meloxicam (MOBIC) 7.5 mg, Oral, Daily PRN    nitroGLYCERIN (NITROSTAT) 0.4 mg, Sublingual, Every 5 min PRN    omeprazole (PRILOSEC) 40 mg, Oral, Daily    ranolazine (RANEXA) 500 mg, Oral, 2 times daily    rosuvastatin (CRESTOR) 20 mg, Oral, Daily        Social History:     Social History     Socioeconomic History    Marital status: Single   Tobacco Use    Smoking status: Never    Smokeless tobacco: Never   Substance and Sexual Activity    Alcohol use: Yes     Alcohol/week: 10.0 standard drinks of alcohol     Types: 10 Cans of beer per week    Drug use: Not Currently       Family History   Problem Relation Name Age of Onset    Stroke Mother      Cancer Father         ROS ROS completed and negative except as indicated above.   Objective:     Vital Signs (Most Recent):  Temp: 97.8 °F (36.6 °C) (06/05/24 0529)  Pulse: 72 (06/05/24 0622)  Resp: 20 (06/05/24 0529)  BP: (!) 144/84 (06/05/24 0622)  SpO2: (!) 94 % (06/05/24 0622)  Body mass index is 38.43 kg/m².  Weight: 121.5 kg (267 lb 13.7 oz) Vital Signs (24h  "Range):  Temp:  [97.8 °F (36.6 °C)] 97.8 °F (36.6 °C)  Pulse:  [70-72] 72  Resp:  [20] 20  SpO2:  [94 %-97 %] 94 %  BP: (144-145)/(84-85) 144/84     Input/output::     Intake/Output Summary (Last 24 hours) at 6/5/2024 1501  Last data filed at 6/5/2024 1406  Gross per 24 hour   Intake 2300 ml   Output 250 ml   Net 2050 ml       Physical Exam:   GEN:  Alert, oriented x3   HEENT:  Insertion of Impella to the left axillary, bleeding noted at the site  CARDIO: regular rate, regular rhythm, normal S1, S2, no murmurs, rubs, clicks or gallops   PULM/CHEST: clear to auscultation bilaterally, no wheezes, rales or rhonchi, symmetric air entry, no accessory muscle use or distress  ABDOMEN: + BS, soft, non tender, non-distended, no guarding and no rebound. no masses or organomegaly   DERM: No rashes or lesions   EXT: peripheral pulses normal, no clubbing or cyanosis. No BLE edema.   NEURO: AAOx3, responding appropriately          Significant Labs:    Laboratory Studies:   No results for input(s): "PH", "PCO2", "PO2", "HCO3", "POCSATURATED", "BE" in the last 24 hours.  No results for input(s): "WBC", "RBC", "HGB", "HCT", "PLT", "MCV", "MCH", "MCHC" in the last 24 hours.  No results for input(s): "GLUCOSE", "NA", "K", "CHLORIDE", "CO2", "BUN", "CREATININE", "CALCIUM", "MG" in the last 24 hours.        ABGs:    No results for input(s): "PH", "PO2", "PCO2", "HCO3", "POCBASEDEF" in the last 168 hours.    Lines/Drains/Airways       Drain  Duration                  Urethral Catheter 06/05/24 0728 Temperature probe 16 Fr. <1 day              Arterial Line  Duration                  Sheath 06/05/24 1006 Right anterior;proximal <1 day    Arterial Line 06/05/24 0649 Right Radial <1 day                     Vent:       Current Medications:     Infusions:   sodium chloride 0.9%   Intravenous Continuous        heparin (porcine) in 5 % dex  3 Units/kg/hr (Adjusted) Intravenous Continuous        sodium bicarbonate 25 mEq in dextrose 5 % (D5W) " 1,000 mL infusion   Intravenous Continuous             Scheduled:        PRN:       Microbiology Data:  Microbiology Results (last 7 days)       ** No results found for the last 168 hours. **             Antibiotics and Day Number of Therapy:  Antibiotics (From admission, onward)      None             CrCl cannot be calculated (Patient's most recent lab result is older than the maximum 7 days allowed.).    Imaging:  Cardiac catheterization    The estimated blood loss was none.    The procedure log was documented by Documenter: Lali Martinez RN and   verified by Brant Flores MD.    Date: 6/5/2024  Time: 2:23 PM    Preprocedure diagnosis-abnormal stress test, surgical turndown, Class 3   angina, chronic systolic heart failure  Postprocedure diagnosis-Obstructive CAD  Estimated blood loss-40 cc  Tissue removal-none  Complications-none    Procedures performed:  1. Ultrasound-guided bilateral groin access  2. Coronary, SVG, LIMA angiography  3. Failed attempt at retrograde  PCI of RCA (unable to cannulate RCA   with guide while 5.5 impella was in place)  4. Shockwave lithotripsy of LM and proximal LAD with 3.5 balloon  5. IVUS of LM, Radial graft to OM  6. PCI of ostial radial graft with 4x16 synergy KALYANI  7. Laser atherectomy of LIMA to LAD, PTCA for ISR with 2.5 NC balloon at   20 yesenia.  8. PTCA native distal LAD with 2.0 balloon.  9. Intracoronary nitro and adenosine administration to distal LAD  10. Perclose Right CFA, angioseal Left CFA  11. RHC    Findings:  1. Left main-80% calcified stenosis reduced to 60%   2. LAD-MID   3. LCX-  4. RCA-, Left to right collaterals  5. Radial graft to OM 90% reduced to 0% post PCI  6. LIMA to LAD-In stent  reduced to 10% post laser, PTA.  LAD is a   bifid system distally  7. Pa sat pre pci 75, post 70  8. PAP 33/11 pre, 33/16 post    Procedure Summary :  Jr 4 guide was used for radial graft PCI. IVUs performed     Failed to engage the ostium of the RCA with  "multiple guides including AL1,   AR2, MP1, ROSIE, 3DRC, JR4.  Could not tell if ostium was occluded, but   torquing catheters was difficult secondary to placed impella 5.5 device.    I needed to perform Shockwave and PTA of the LM in order to attempt   retrograde RCA  PCI.  I was able to get a wire retrograde via S1 to PDA   but unable to deliver a catheter to the ostium of the RCA.  RCA  PCI   was then aborted.    IM guide was used for the LIMA.  A corsair catheter and mongo wire were   used to cross the occluded stent.  Laser was performed with an 0.9   catheter.  PTA with a 2.5 NC balloon at 20 yesenia in the stent, PTA of the   distal LAD with a 2.0 balloon.  Final angio was sluggish and vessel was   small.           Plan:  1. ASA and plavix today  2. Impella site management per CTS  3. Keep swan in place while impella still inserted.  4.  If impella is staying in until Friday would recommend PCI of the LM to   LAD in order to make future attempts at  PCI of the RCA and LAD easier.      SURG FL Surgery Fluoro Usage  See OP Notes for results.     IMPRESSION: See OP Notes for results.     This procedure was auto-finalized by: Virtual Radiologist        2D ECHO Results    Results for orders placed during the hospital encounter of 04/19/24    Echo    Interpretation Summary    Left Ventricle: Regional wall motion abnormalities present. The apical lateral, anterior and inferior walls are all hypokinetic. Basal and mid walls all augment WNL. There is moderately reduced systolic function with a visually estimated ejection fraction of 35 - 40%.    Tricuspid Valve: There is mild regurgitation.      Pulmonary Functions Testing Results:    No results found for: "FEV1", "FVC", "URI3YFE", "TLC", "DLCO"      Assessment/Plan:     Assessment:  Severe triple-vessel CAD and non-amenable to surgical revascularization, Impella placement for high-risk PCI  History of CABG x3 in 1999  Impella site bleeding  Acute on chronic " systolic and diastolic heart failure with EF of 30%   Obesity  Hypertension  Hyperlipidemia  Sleep apnea  CVA        Plan:  Admit to ICU for close monitoring   Patient was on 10 L OxyMask satting well at this time; wean as tolerated  Impella management by CT surgery; continue heparin drip at this time and assess for any bleeding  Ordered CBC and CMP; transfuse as needed  Cardiology and CT surgery on board; appreciate their assistance  Continue normal saline at 100 cc/hour          DVT Prophylaxis: scd  GI prophylaxis: None   Keep HOB elevated > 30°  Maintain blood glucose levels 140-180    32 minutes of critical care was time spent personally by me on the following activities: development of treatment plan with patient or surrogate and bedside caregivers, discussions with consultants, evaluation of patient's response to treatment, examination of patient, ordering and performing treatments and interventions, ordering and review of laboratory studies, ordering and review of radiographic studies, pulse oximetry, re-evaluation of patient's condition.  This critical care time did not overlap with that of any other provider or involve time for any procedures.     Inés Corona, DO  Pulmonary/Critical Care  [facility] - [facility] ICU

## 2024-06-05 NOTE — Clinical Note
Orders Placed This Encounter   Medications    naltrexone-bupropion (CONTRAVE) 8-90 MG per extended release tablet     Sig: Take 2 tablets by mouth 2 times daily Start daily for a week, then increase to BID for 1 week, then 2QAM, 1QPM for a week, then 2 Pills BID     Dispense:  120 tablet     Refill:  2       The above med(s) were e-scripted to the patient's pharmacy.    Please advise patient  John Pérez MD The catheter was removed from the ostium   left main.

## 2024-06-05 NOTE — ANESTHESIA PREPROCEDURE EVALUATION
06/05/2024  Carlos Waller is a 68 y.o., male.     Patient is a very nice 68-year-old obese man with history of coronary artery disease status post coronary artery bypass grafting and peripheral arterial revascularization.  He comes now with stable angina and a recent left heart catheterization which reveals severe three-vessel coronary artery disease and moderate to severely depressed left ventricular function.  Coronary anatomy is indicative of severe diffuse disease.  Echocardiogram noted.     Discussed with Dr. Flores  and films reviewed with him and we have come to the shared decision that percutaneous intervention with Impella support would be the best option for this very nice patient.    Past Medical History:   Diagnosis Date    Anemia, unspecified     Arthritis     Chest pain     Coronary artery disease     Coronary artery disease involving coronary bypass graft of native heart, unspecified whether angina present     Hyperlipidemia     Hypertension     Obesity     Right shoulder pain     Sleep apnea     Stroke     left side numbness      Past Surgical History:   Procedure Laterality Date    CARDIAC CATHETERIZATION  2016    COLONOSCOPY W/ POLYPECTOMY      CORONARY ANGIOGRAPHY INCLUDING BYPASS GRAFTS WITH CATHETERIZATION OF LEFT HEART Left 07/29/2022    Procedure: ANGIOGRAM, CORONARY, INCLUDING BYPASS GRAFT, WITH LEFT HEART CATHETERIZATION;  Surgeon: Constantin Martino MD;  Location: [facility] CATH LAB;  Service: Cardiology;  Laterality: Left;    CORONARY ARTERY BYPASS GRAFT  1999    CORONARY BYPASS GRAFT ANGIOGRAPHY  04/19/2024    Procedure: Bypass graft study;  Surgeon: Constantin Martino MD;  Location: [facility] CATH LAB;  Service: Cardiology;;    CORONARY STENT PLACEMENT      x2    HERNIA REPAIR      failed    LEFT HEART CATHETERIZATION Left 04/19/2024    Procedure: Left heart cath;  Surgeon: Constantin Martino,  MD;  Location: [facility] CATH LAB;  Service: Cardiology;  Laterality: Left;  LHC +/- PCI VIA RT FEMORAL ARTERY    PERCUTANEOUS TRANSLUMINAL BALLOON ANGIOPLASTY OF BYPASS GRAFT N/A 07/29/2022    Procedure: PTA, BYPASS GRAFT, USING BALLOON;  Surgeon: Constantin Martino MD;  Location: [facility] CATH LAB;  Service: Cardiology;  Laterality: N/A;      Current Facility-Administered Medications on File Prior to Encounter   Medication Dose Route Frequency Provider Last Rate Last Admin    diazePAM tablet 10 mg  10 mg Oral On Call Procedure Constantin Martino MD   10 mg at 04/19/24 0729    diphenhydrAMINE capsule 50 mg  50 mg Oral On Call Procedure Constantin Martino MD   50 mg at 07/29/22 0844    diphenhydrAMINE capsule 50 mg  50 mg Oral On Call Procedure Constantin Martino MD   50 mg at 04/19/24 0730    sodium chloride 0.9% flush 10 mL  10 mL Intravenous PRN Constantin Martino MD        sodium chloride 0.9% flush 10 mL  10 mL Intravenous PRN Constantin Martino MD         Current Outpatient Medications on File Prior to Encounter   Medication Sig Dispense Refill    aspirin (ECOTRIN) 81 MG EC tablet Take 81 mg by mouth once daily.      carvediloL (COREG) 6.25 MG tablet Take 6.25 mg by mouth 2 (two) times daily.      clopidogreL (PLAVIX) 75 mg tablet Take 75 mg by mouth once daily.      cyclobenzaprine (FLEXERIL) 10 MG tablet Take 10 mg by mouth 3 (three) times daily as needed for Muscle spasms.      gabapentin (NEURONTIN) 100 MG capsule Take 100 mg by mouth 2 (two) times daily.      isosorbide mononitrate (IMDUR) 30 MG 24 hr tablet Take 30 mg by mouth once daily.      lisinopriL (PRINIVIL,ZESTRIL) 30 MG tablet Take 20 mg by mouth once daily.      meloxicam (MOBIC) 7.5 MG tablet Take 7.5 mg by mouth daily as needed for Pain.      nitroGLYCERIN (NITROSTAT) 0.4 MG SL tablet Place 0.4 mg under the tongue every 5 (five) minutes as needed for Chest pain.      omeprazole (PRILOSEC) 40 MG capsule Take 40 mg by mouth once daily.      ranolazine (RANEXA) 500  MG Tb12 Take 500 mg by mouth 2 (two) times daily.      rosuvastatin (CRESTOR) 20 MG tablet Take 20 mg by mouth once daily.      dipyridamole-aspirin 200-25 mg (AGGRENOX)  mg CM12 Take by mouth.      ezetimibe (ZETIA) 10 mg tablet Take 10 mg by mouth.      levocetirizine (XYZAL) 5 MG tablet Take 5 mg by mouth daily as needed for Allergies.        4/19/24 TTE:    Left Ventricle: Regional wall motion abnormalities present. The apical lateral, anterior and inferior walls are all hypokinetic. Basal and mid walls all augment WNL. There is moderately reduced systolic function with a visually estimated ejection fraction of 35 - 40%.    Tricuspid Valve: There is mild regurgitation.    Pre-op Assessment    I have reviewed the Patient Summary Reports.     I have reviewed the Nursing Notes. I have reviewed the NPO Status.   I have reviewed the Medications.     Review of Systems  Anesthesia Hx:  No problems with previous Anesthesia                Cardiovascular:     Hypertension   CAD   CABG/stent        hyperlipidemia                             Pulmonary:        Sleep Apnea                Neurological:   CVA                                    Endocrine:        Obesity / BMI > 30      Physical Exam  General: Cooperative, Alert and Oriented    Airway:  Mallampati: III   Mouth Opening: Normal  TM Distance: Normal  Tongue: Normal  Neck ROM: Extension Decreased    Dental:  Intact        Anesthesia Plan  Type of Anesthesia, risks & benefits discussed:    Anesthesia Type: Gen ETT  Intra-op Monitoring Plan: Standard ASA Monitors and Art Line  Post Op Pain Control Plan: multimodal analgesia  Induction:  IV  Airway Plan: Video, Post-Induction  Informed Consent: Informed consent signed with the Patient and all parties understand the risks and agree with anesthesia plan.  All questions answered. Patient consented to blood products? Yes  ASA Score: 3  Day of Surgery Review of History & Physical: H&P Update referred to the  surgeon/provider.I have interviewed and examined the patient. I have reviewed the patient's H&P dated: There are no significant changes.     Ready For Surgery From Anesthesia Perspective.     .

## 2024-06-05 NOTE — OP NOTE
[facility]  Cardiothoracic Surgery  Operative Note    SUMMARY     Date of Procedure: 6/5/2024     Procedure:  1.  Impella left ventricular assist pump (5.5 L) placement with 10 mm Dacron conduit via left axillary artery approach    Surgeon: HANG Zuleta     Assistant: Almas Vaz    Pre-Operative Diagnosis:  1.  Severe three-vessel coronary artery disease unamenable to surgical revascularization  2.  Status post coronary artery bypass grafting   3.  Obesity  4. Acute and chronic combined systolic and diastolic congestive heart failure ( ejection fraction 30%)   5.  Shortness of breath  6.  Dyspnea on exertion    Post-Operative Diagnosis:  Same    Anesthesia: General    Operative Findings (including complications, if any):  Dictated    Description of Technical Procedures:  The patient was delivered to the operating room, support lines were placed, general endotracheal anesthesia was induced.  The patient was prepped and draped in usual sterile fashion.  The infraclavicular area was approached in the deltopectoral groove and a 5 cm incision was made in a transverse fashion beneath the distal 3rd of the clavicle.  Subcutaneous tissues were dissected with the Bovie electrocautery.  Care was taken to observe and preserve the brachial plexus during the dissection.  The axillary artery was then identified and encircled with vessel loops.  Heparin was administered.  Once the ACT was satisfactory the axillary artery was clamped both proximally and distally.  An 11 blade was used to make an arteriotomy in a longitudinal fashion and the M[place]illa scissors were used to extend the arteriotomy.  A 10 mm gel weave Dacron graft was then delivered to the field and appropriately prepared.  Five 0 [place]-Aron suture were used to create an end-to-side anastomosis of the Dacron graft with the axillary artery.  Hemostasis was achieved.  The Impella pump was then appropriately prepared and the Dacron graft  was outfitted with the appropriate sheath.  A J-wire and Glide catheter were used to direct the wire into the left ventricle under fluoroscopic and echocardiographic guidance.  The J-wire was then exchanged for the Impella wire which was delivered through the [place] catheter.  The device was then loaded onto the back of the wire and advanced through the graft into the axillary artery and subsequently into the ascending aorta across the aortic valve and part in the ventricle.  Transesophageal echocardiogram was then used to evaluate the depth of the Impella device which was found to be appropriate.  The device was then sutured to the skin and activated.  Normal inappropriate flows were obtained from the Impella pump and wound was irrigated with copious amounts of saline irrigation.  Fascia of the pectoralis muscle was then closed with interrupted 2-0 Vicryl sutures and skin was closed with staples.  The patient tolerated the procedure well and will be delivered to the recovery room in stable condition.    Estimated Blood Loss (EBL):  30 mL          Specimens:   Specimen (24h ago, onward)      None                    Condition: Stable    Disposition: PACU - hemodynamically stable.

## 2024-06-05 NOTE — Clinical Note
The catheter was inserted into the, was removed from the, insertion attempt was made into the and was inserted over the wire into the ostium   right coronary artery. The catheter was unable to engage the area..

## 2024-06-05 NOTE — Clinical Note
An angiography was performed of the left coronary arteries and graft. The angiography was performed via power injection.

## 2024-06-05 NOTE — Clinical Note
dry, intact, no bleeding and no hematoma. To bilateral sheath removal sites and 7 fr femoral vein sheath.Impella oozing and redressed.

## 2024-06-05 NOTE — ANESTHESIA PROCEDURE NOTES
Peripheral IV Insertion    Diagnosis: I87.9 Disorder of vein, unspecified.    Patient location during procedure: OR  Timeout: 6/5/2024 7:09 AM  Procedure end time: 6/5/2024 7:10 AM    Staffing  Authorizing Provider: Guille Mccall MD  Performing Provider: Rudy Bro CRNA    Staffing  Performed by: Rudy Bro CRNA  Authorized by: Guille Mccall MD    Anesthesiologist was present at the time of the procedure.    Preanesthetic Checklist  Completed: patient identified, IV checked, site marked, risks and benefits discussed, surgical consent, monitors and equipment checked, pre-op evaluation, timeout performed and anesthesia consent givenPeripheral IV Insertion  Skin Prep: alcohol swabs  Local Infiltration: none  Orientation: left  Location: hand  Catheter Type: peripheral IV (single lumen)  Catheter Size: 18 G  Catheter placement by Anatomical landmarks. Heme positive aspiration all ports. Insertion Attempts: 1  Assessment  Dressing: secured with tape and tegaderm  Patient: Tolerated well  Line flushed easily.

## 2024-06-05 NOTE — H&P
[facility] - Periop Services  History & Physical  Cardiothoracic Surgery    SUBJECTIVE:     Chief Complaint/Reason for Admission: chest pain    History of Present Illness:  Patient is a 68 y.o. male presents with chest pain, lknown mv cad c depressed ef.      Family History of Heart Disease: yes    Current Facility-Administered Medications on File Prior to Encounter   Medication Dose Route Frequency Provider Last Rate Last Admin    diazePAM tablet 10 mg  10 mg Oral On Call Procedure Constantin Martino MD   10 mg at 04/19/24 0729    diphenhydrAMINE capsule 50 mg  50 mg Oral On Call Procedure Constantin Martino MD   50 mg at 07/29/22 0844    diphenhydrAMINE capsule 50 mg  50 mg Oral On Call Procedure Constantin Martino MD   50 mg at 04/19/24 0730    sodium chloride 0.9% flush 10 mL  10 mL Intravenous PRN Constantin Martino MD        sodium chloride 0.9% flush 10 mL  10 mL Intravenous PRN Constantin Martino MD         Current Outpatient Medications on File Prior to Encounter   Medication Sig Dispense Refill    aspirin (ECOTRIN) 81 MG EC tablet Take 81 mg by mouth once daily.      carvediloL (COREG) 6.25 MG tablet Take 6.25 mg by mouth 2 (two) times daily.      clopidogreL (PLAVIX) 75 mg tablet Take 75 mg by mouth once daily.      cyclobenzaprine (FLEXERIL) 10 MG tablet Take 10 mg by mouth 3 (three) times daily as needed for Muscle spasms.      gabapentin (NEURONTIN) 100 MG capsule Take 100 mg by mouth 2 (two) times daily.      isosorbide mononitrate (IMDUR) 30 MG 24 hr tablet Take 30 mg by mouth once daily.      lisinopriL (PRINIVIL,ZESTRIL) 30 MG tablet Take 20 mg by mouth once daily.      meloxicam (MOBIC) 7.5 MG tablet Take 7.5 mg by mouth daily as needed for Pain.      nitroGLYCERIN (NITROSTAT) 0.4 MG SL tablet Place 0.4 mg under the tongue every 5 (five) minutes as needed for Chest pain.      omeprazole (PRILOSEC) 40 MG capsule Take 40 mg by mouth once daily.      ranolazine (RANEXA) 500 MG Tb12 Take 500 mg  by mouth 2 (two) times daily.      rosuvastatin (CRESTOR) 20 MG tablet Take 20 mg by mouth once daily.      dipyridamole-aspirin 200-25 mg (AGGRENOX)  mg CM12 Take by mouth.      ezetimibe (ZETIA) 10 mg tablet Take 10 mg by mouth.      levocetirizine (XYZAL) 5 MG tablet Take 5 mg by mouth daily as needed for Allergies.          Review of patient's allergies indicates:   Allergen Reactions    Codeine Itching        Past Medical History:   Diagnosis Date    Anemia, unspecified     Arthritis     Chest pain     Coronary artery disease     Coronary artery disease involving coronary bypass graft of native heart, unspecified whether angina present     Hyperlipidemia     Hypertension     Obesity     Right shoulder pain     Sleep apnea     Stroke     left side numbness        Past Surgical History:   Procedure Laterality Date    CARDIAC CATHETERIZATION  2016    COLONOSCOPY W/ POLYPECTOMY      CORONARY ANGIOGRAPHY INCLUDING BYPASS GRAFTS WITH CATHETERIZATION OF LEFT HEART Left 07/29/2022    Procedure: ANGIOGRAM, CORONARY, INCLUDING BYPASS GRAFT, WITH LEFT HEART CATHETERIZATION;  Surgeon: Constantin Martino MD;  Location: [facility] CATH LAB;  Service: Cardiology;  Laterality: Left;    CORONARY ARTERY BYPASS GRAFT  1999    CORONARY BYPASS GRAFT ANGIOGRAPHY  04/19/2024    Procedure: Bypass graft study;  Surgeon: Constantin Martino MD;  Location: [facility] CATH LAB;  Service: Cardiology;;    CORONARY STENT PLACEMENT      x2    HERNIA REPAIR      failed    LEFT HEART CATHETERIZATION Left 04/19/2024    Procedure: Left heart cath;  Surgeon: Constantin Martino MD;  Location: [facility] CATH LAB;  Service: Cardiology;  Laterality: Left;  [facility] +/- PCI VIA RT FEMORAL ARTERY    PERCUTANEOUS TRANSLUMINAL BALLOON ANGIOPLASTY OF BYPASS GRAFT N/A 07/29/2022    Procedure: PTA, BYPASS GRAFT, USING BALLOON;  Surgeon: Constantin Martino MD;  Location: [facility] CATH LAB;  Service: Cardiology;  Laterality: N/A;           Review of Systems:  Review of Systems    Cardiovascular:  Positive for chest pain.   All other systems reviewed and are negative.       OBJECTIVE:        Physical Exam:  Physical Exam  Vitals reviewed.   HENT:      Head: Normocephalic.      Right Ear: Tympanic membrane normal.      Left Ear: Tympanic membrane normal.      Nose: Nose normal.      Mouth/Throat:      Mouth: Mucous membranes are moist.   Eyes:      Pupils: Pupils are equal, round, and reactive to light.   Cardiovascular:      Rate and Rhythm: Normal rate and regular rhythm.      Pulses: Normal pulses.   Pulmonary:      Effort: Pulmonary effort is normal.      Breath sounds: Normal breath sounds.   Abdominal:      Palpations: Abdomen is soft.   Musculoskeletal:         General: Normal range of motion.      Cervical back: Normal range of motion.   Skin:     General: Skin is warm.   Neurological:      General: No focal deficit present.      Mental Status: He is alert.   Psychiatric:         Mood and Affect: Mood normal.          Laboratory:  I have reviewed all pertinent lab results within the past 24 hours.    Diagnostic Results:  Cardiac Cath: Reviewed          ASSESSMENT/PLAN:     A: CAD  P: Impella placement for high risk pci today

## 2024-06-05 NOTE — ANESTHESIA PROCEDURE NOTES
Intubation    Date/Time: 6/5/2024 7:07 AM    Performed by: Rudy Bro CRNA  Authorized by: Guille Mccall MD    Intubation:     Induction:  Intravenous    Intubated:  Postinduction    Mask Ventilation:  Easy with oral airway    Attempts:  1    Attempted By:  CRNA    Method of Intubation:  Video laryngoscopy    Blade:  Glidescope 3    Laryngeal View Grade: Grade IIA - cords partially seen      Difficult Airway Encountered?: No      Complications:  None    Airway Device:  Oral endotracheal tube    Airway Device Size:  8.0    Style/Cuff Inflation:  Cuffed (inflated to minimal occlusive pressure)    Inflation Amount (mL):  6    Tube secured:  21    Secured at:  The lips    Placement Verified By:  Capnometry    Complicating Factors:  None    Findings Post-Intubation:  BS equal bilateral and atraumatic/condition of teeth unchanged

## 2024-06-05 NOTE — Clinical Note
The catheter was inserted into the ostial LIMA graft. An angiography was performed of the right coronary arteries and graft. The angiography was performed via power injection.

## 2024-06-05 NOTE — TRANSFER OF CARE
"Anesthesia Transfer of Care Note    Patient: Carlos ALLEN Calion    Procedure(s) Performed: Procedure(s) (LRB):  INSERTION, IMPELLA (N/A)    Patient location: PACU    Anesthesia Type: general    Transport from OR: Transported from OR on 2-3 L/min O2 by NC with adequate spontaneous ventilation    Post pain: adequate analgesia    Post assessment: no apparent anesthetic complications    Post vital signs: stable    Level of consciousness: awake and sedated    Nausea/Vomiting: no nausea/vomiting    Complications: none    Transfer of care protocol was followedComments: Patient stable and responding appropriately. VSS, Left upper clavicular area noted to have blood on dressings but not actively bleeding    Last vitals: Visit Vitals  /68   Pulse 71   Temp 36.6 °C (97.8 °F) (Oral)   Resp (!) 26   Ht 5' 10" (1.778 m)   Wt 121.5 kg (267 lb 13.7 oz)   SpO2 95%   BMI 38.43 kg/m²     "

## 2024-06-06 ENCOUNTER — ANESTHESIA EVENT (OUTPATIENT)
Dept: CARDIOLOGY | Facility: HOSPITAL | Age: 69
DRG: 215 | End: 2024-06-06
Payer: MEDICARE

## 2024-06-06 ENCOUNTER — ANESTHESIA (OUTPATIENT)
Dept: CARDIOLOGY | Facility: HOSPITAL | Age: 69
DRG: 215 | End: 2024-06-06
Payer: MEDICARE

## 2024-06-06 LAB
ABO + RH BLD: NORMAL
ABO + RH BLD: NORMAL
ALBUMIN SERPL-MCNC: 3.4 G/DL (ref 3.4–4.8)
ALBUMIN/GLOB SERPL: 1.4 RATIO (ref 1.1–2)
ALLENS TEST BLOOD GAS (OHS): ABNORMAL
ALP SERPL-CCNC: 49 UNIT/L (ref 40–150)
ALT SERPL-CCNC: 18 UNIT/L (ref 0–55)
ANION GAP SERPL CALC-SCNC: 9 MEQ/L
AST SERPL-CCNC: 32 UNIT/L (ref 5–34)
BASE EXCESS BLD CALC-SCNC: -0.1 MMOL/L
BASE EXCESS BLD CALC-SCNC: -3 MMOL/L (ref -2–2)
BASOPHILS # BLD AUTO: 0.03 X10(3)/MCL
BASOPHILS NFR BLD AUTO: 0.2 %
BILIRUB SERPL-MCNC: 2.3 MG/DL
BLD PROD TYP BPU: NORMAL
BLD PROD TYP BPU: NORMAL
BLOOD GAS SAMPLE TYPE (OHS): ABNORMAL
BLOOD GAS SAMPLE TYPE (OHS): ABNORMAL
BLOOD UNIT EXPIRATION DATE: NORMAL
BLOOD UNIT EXPIRATION DATE: NORMAL
BLOOD UNIT TYPE CODE: 9500
BLOOD UNIT TYPE CODE: 9500
BUN SERPL-MCNC: 15.4 MG/DL (ref 8.4–25.7)
CA-I BLD-SCNC: 1.12 MMOL/L (ref 1.12–1.23)
CA-I BLD-SCNC: 1.13 MMOL/L (ref 1.12–1.23)
CALCIUM SERPL-MCNC: 8.3 MG/DL (ref 8.8–10)
CHLORIDE SERPL-SCNC: 106 MMOL/L (ref 98–107)
CO2 BLDA-SCNC: 23.2 MMOL/L
CO2 BLDA-SCNC: 26.8 MMOL/L
CO2 SERPL-SCNC: 20 MMOL/L (ref 23–31)
COHGB MFR BLDA: 2.4 % (ref 0.5–1.5)
COHGB MFR BLDA: 2.8 %
CREAT SERPL-MCNC: 0.72 MG/DL (ref 0.73–1.18)
CREAT/UREA NIT SERPL: 21
CROSSMATCH INTERPRETATION: NORMAL
CROSSMATCH INTERPRETATION: NORMAL
DISPENSE STATUS: NORMAL
DISPENSE STATUS: NORMAL
DRAWN BY BLOOD GAS (OHS): ABNORMAL
EOSINOPHIL # BLD AUTO: 0 X10(3)/MCL (ref 0–0.9)
EOSINOPHIL NFR BLD AUTO: 0 %
ERYTHROCYTE [DISTWIDTH] IN BLOOD BY AUTOMATED COUNT: 12.7 % (ref 11.5–17)
GFR SERPLBLD CREATININE-BSD FMLA CKD-EPI: >60 ML/MIN/1.73/M2
GLOBULIN SER-MCNC: 2.5 GM/DL (ref 2.4–3.5)
GLUCOSE SERPL-MCNC: 122 MG/DL (ref 82–115)
GLUCOSE SERPL-MCNC: 90 MG/DL (ref 70–110)
GLUCOSE SERPL-MCNC: 92 MG/DL (ref 70–110)
GLUCOSE SERPL-MCNC: 98 MG/DL (ref 70–110)
GROUP & RH: NORMAL
HCO3 BLDA-SCNC: 22 MMOL/L (ref 22–26)
HCO3 BLDA-SCNC: 25.4 MMOL/L
HCT VFR BLD AUTO: 38.5 % (ref 42–52)
HGB BLD-MCNC: 13.2 G/DL (ref 14–18)
IMM GRANULOCYTES # BLD AUTO: 0.04 X10(3)/MCL (ref 0–0.04)
IMM GRANULOCYTES NFR BLD AUTO: 0.3 %
INDIRECT COOMBS: NORMAL
INHALED O2 CONCENTRATION: 21 %
LYMPHOCYTES # BLD AUTO: 1.13 X10(3)/MCL (ref 0.6–4.6)
LYMPHOCYTES NFR BLD AUTO: 8.4 %
MAGNESIUM SERPL-MCNC: 1.7 MG/DL (ref 1.6–2.6)
MCH RBC QN AUTO: 32.2 PG (ref 27–31)
MCHC RBC AUTO-ENTMCNC: 34.3 G/DL (ref 33–36)
MCV RBC AUTO: 93.9 FL (ref 80–94)
METHGB MFR BLDA: 0.7 %
METHGB MFR BLDA: 1 % (ref 0.4–1.5)
MONOCYTES # BLD AUTO: 1.22 X10(3)/MCL (ref 0.1–1.3)
MONOCYTES NFR BLD AUTO: 9 %
NEUTROPHILS # BLD AUTO: 11.07 X10(3)/MCL (ref 2.1–9.2)
NEUTROPHILS NFR BLD AUTO: 82.1 %
NRBC BLD AUTO-RTO: 0 %
O2 HB BLOOD GAS (OHS): 62.9 %
O2 HB BLOOD GAS (OHS): 89.7 % (ref 94–97)
OXYHGB MFR BLDA: 11.4 G/DL
OXYHGB MFR BLDA: 11.6 G/DL (ref 12–16)
PCO2 BLDA: 38 MMHG (ref 35–45)
PCO2 BLDA: 44 MMHG (ref 20–50)
PH BLDA: 7.37 [PH] (ref 7.35–7.45)
PH BLDA: 7.37 [PH] (ref 7.3–7.6)
PHOSPHATE SERPL-MCNC: 3.6 MG/DL (ref 2.3–4.7)
PLATELET # BLD AUTO: 224 X10(3)/MCL (ref 130–400)
PMV BLD AUTO: 10.1 FL (ref 7.4–10.4)
PO2 BLDA: 56 MMHG (ref 80–100)
PO2 BLDA: <38 MMHG
POCT GLUCOSE: 90 MG/DL (ref 70–110)
POCT GLUCOSE: 98 MG/DL (ref 70–110)
POTASSIUM BLOOD GAS (OHS): 3.6 MMOL/L (ref 3.5–5)
POTASSIUM BLOOD GAS (OHS): 3.6 MMOL/L (ref 3.5–5)
POTASSIUM SERPL-SCNC: 4.2 MMOL/L (ref 3.5–5.1)
PROT SERPL-MCNC: 5.9 GM/DL (ref 5.8–7.6)
RBC # BLD AUTO: 4.1 X10(6)/MCL (ref 4.7–6.1)
SAMPLE SITE BLOOD GAS (OHS): ABNORMAL
SAO2 % BLDA: 52.7 %
SAO2 % BLDA: 87.8 %
SODIUM BLOOD GAS (OHS): 134 MMOL/L (ref 137–145)
SODIUM BLOOD GAS (OHS): 136 MMOL/L (ref 137–145)
SODIUM SERPL-SCNC: 135 MMOL/L (ref 136–145)
SPECIMEN OUTDATE: NORMAL
UNIT NUMBER: NORMAL
UNIT NUMBER: NORMAL
WBC # SPEC AUTO: 13.49 X10(3)/MCL (ref 4.5–11.5)

## 2024-06-06 PROCEDURE — 84100 ASSAY OF PHOSPHORUS: CPT | Performed by: INTERNAL MEDICINE

## 2024-06-06 PROCEDURE — 25000003 PHARM REV CODE 250: Performed by: NURSE ANESTHETIST, CERTIFIED REGISTERED

## 2024-06-06 PROCEDURE — C1753 CATH, INTRAVAS ULTRASOUND: HCPCS | Performed by: STUDENT IN AN ORGANIZED HEALTH CARE EDUCATION/TRAINING PROGRAM

## 2024-06-06 PROCEDURE — 4A023N7 MEASUREMENT OF CARDIAC SAMPLING AND PRESSURE, LEFT HEART, PERCUTANEOUS APPROACH: ICD-10-PCS | Performed by: STUDENT IN AN ORGANIZED HEALTH CARE EDUCATION/TRAINING PROGRAM

## 2024-06-06 PROCEDURE — 85025 COMPLETE CBC W/AUTO DIFF WBC: CPT | Performed by: INTERNAL MEDICINE

## 2024-06-06 PROCEDURE — C1760 CLOSURE DEV, VASC: HCPCS | Performed by: STUDENT IN AN ORGANIZED HEALTH CARE EDUCATION/TRAINING PROGRAM

## 2024-06-06 PROCEDURE — C9600 PERC DRUG-EL COR STENT SING: HCPCS | Mod: LM | Performed by: STUDENT IN AN ORGANIZED HEALTH CARE EDUCATION/TRAINING PROGRAM

## 2024-06-06 PROCEDURE — 25000003 PHARM REV CODE 250: Performed by: INTERNAL MEDICINE

## 2024-06-06 PROCEDURE — 02F03ZZ FRAGMENTATION IN CORONARY ARTERY, ONE ARTERY, PERCUTANEOUS APPROACH: ICD-10-PCS | Performed by: STUDENT IN AN ORGANIZED HEALTH CARE EDUCATION/TRAINING PROGRAM

## 2024-06-06 PROCEDURE — 25000003 PHARM REV CODE 250: Performed by: STUDENT IN AN ORGANIZED HEALTH CARE EDUCATION/TRAINING PROGRAM

## 2024-06-06 PROCEDURE — 36415 COLL VENOUS BLD VENIPUNCTURE: CPT

## 2024-06-06 PROCEDURE — C1894 INTRO/SHEATH, NON-LASER: HCPCS | Performed by: STUDENT IN AN ORGANIZED HEALTH CARE EDUCATION/TRAINING PROGRAM

## 2024-06-06 PROCEDURE — C1769 GUIDE WIRE: HCPCS | Performed by: STUDENT IN AN ORGANIZED HEALTH CARE EDUCATION/TRAINING PROGRAM

## 2024-06-06 PROCEDURE — 83735 ASSAY OF MAGNESIUM: CPT | Performed by: INTERNAL MEDICINE

## 2024-06-06 PROCEDURE — 82803 BLOOD GASES ANY COMBINATION: CPT

## 2024-06-06 PROCEDURE — C1874 STENT, COATED/COV W/DEL SYS: HCPCS | Performed by: STUDENT IN AN ORGANIZED HEALTH CARE EDUCATION/TRAINING PROGRAM

## 2024-06-06 PROCEDURE — 25000003 PHARM REV CODE 250: Performed by: NURSE PRACTITIONER

## 2024-06-06 PROCEDURE — 63600175 PHARM REV CODE 636 W HCPCS: Performed by: NURSE ANESTHETIST, CERTIFIED REGISTERED

## 2024-06-06 PROCEDURE — 86901 BLOOD TYPING SEROLOGIC RH(D): CPT

## 2024-06-06 PROCEDURE — C1887 CATHETER, GUIDING: HCPCS | Performed by: STUDENT IN AN ORGANIZED HEALTH CARE EDUCATION/TRAINING PROGRAM

## 2024-06-06 PROCEDURE — 86850 RBC ANTIBODY SCREEN: CPT

## 2024-06-06 PROCEDURE — 20000000 HC ICU ROOM

## 2024-06-06 PROCEDURE — 94760 N-INVAS EAR/PLS OXIMETRY 1: CPT | Mod: XB

## 2024-06-06 PROCEDURE — 027034Z DILATION OF CORONARY ARTERY, ONE ARTERY WITH DRUG-ELUTING INTRALUMINAL DEVICE, PERCUTANEOUS APPROACH: ICD-10-PCS | Performed by: STUDENT IN AN ORGANIZED HEALTH CARE EDUCATION/TRAINING PROGRAM

## 2024-06-06 PROCEDURE — 63600175 PHARM REV CODE 636 W HCPCS: Performed by: HOSPITALIST

## 2024-06-06 PROCEDURE — P9047 ALBUMIN (HUMAN), 25%, 50ML: HCPCS | Mod: JZ,JG | Performed by: ANESTHESIOLOGY

## 2024-06-06 PROCEDURE — 27201423 OPTIME MED/SURG SUP & DEVICES STERILE SUPPLY: Performed by: STUDENT IN AN ORGANIZED HEALTH CARE EDUCATION/TRAINING PROGRAM

## 2024-06-06 PROCEDURE — B2111ZZ FLUOROSCOPY OF MULTIPLE CORONARY ARTERIES USING LOW OSMOLAR CONTRAST: ICD-10-PCS | Performed by: STUDENT IN AN ORGANIZED HEALTH CARE EDUCATION/TRAINING PROGRAM

## 2024-06-06 PROCEDURE — 80053 COMPREHEN METABOLIC PANEL: CPT | Performed by: STUDENT IN AN ORGANIZED HEALTH CARE EDUCATION/TRAINING PROGRAM

## 2024-06-06 PROCEDURE — D9220A PRA ANESTHESIA: Mod: ANES,,, | Performed by: ANESTHESIOLOGY

## 2024-06-06 PROCEDURE — 25500020 PHARM REV CODE 255: Performed by: STUDENT IN AN ORGANIZED HEALTH CARE EDUCATION/TRAINING PROGRAM

## 2024-06-06 PROCEDURE — 63600175 PHARM REV CODE 636 W HCPCS: Performed by: INTERNAL MEDICINE

## 2024-06-06 PROCEDURE — 37799 UNLISTED PX VASCULAR SURGERY: CPT

## 2024-06-06 PROCEDURE — B240ZZ3 ULTRASONOGRAPHY OF SINGLE CORONARY ARTERY, INTRAVASCULAR: ICD-10-PCS | Performed by: STUDENT IN AN ORGANIZED HEALTH CARE EDUCATION/TRAINING PROGRAM

## 2024-06-06 PROCEDURE — C1725 CATH, TRANSLUMIN NON-LASER: HCPCS | Performed by: STUDENT IN AN ORGANIZED HEALTH CARE EDUCATION/TRAINING PROGRAM

## 2024-06-06 PROCEDURE — C1761 OPTIME CATH, TRANSLUMINAL INTRAVASC LITHO, CORONARY: HCPCS | Performed by: STUDENT IN AN ORGANIZED HEALTH CARE EDUCATION/TRAINING PROGRAM

## 2024-06-06 PROCEDURE — 37000009 HC ANESTHESIA EA ADD 15 MINS: Performed by: STUDENT IN AN ORGANIZED HEALTH CARE EDUCATION/TRAINING PROGRAM

## 2024-06-06 PROCEDURE — 86900 BLOOD TYPING SEROLOGIC ABO: CPT

## 2024-06-06 PROCEDURE — 25000003 PHARM REV CODE 250: Performed by: PHYSICIAN ASSISTANT

## 2024-06-06 PROCEDURE — 99900035 HC TECH TIME PER 15 MIN (STAT)

## 2024-06-06 PROCEDURE — 92972 PERQ TRLUML CORONRY LITHOTRP: CPT | Performed by: STUDENT IN AN ORGANIZED HEALTH CARE EDUCATION/TRAINING PROGRAM

## 2024-06-06 PROCEDURE — 92978 ENDOLUMINL IVUS OCT C 1ST: CPT | Performed by: STUDENT IN AN ORGANIZED HEALTH CARE EDUCATION/TRAINING PROGRAM

## 2024-06-06 PROCEDURE — 63600175 PHARM REV CODE 636 W HCPCS: Mod: JZ,JG | Performed by: ANESTHESIOLOGY

## 2024-06-06 PROCEDURE — 85347 COAGULATION TIME ACTIVATED: CPT | Performed by: STUDENT IN AN ORGANIZED HEALTH CARE EDUCATION/TRAINING PROGRAM

## 2024-06-06 PROCEDURE — 63600175 PHARM REV CODE 636 W HCPCS: Performed by: NURSE PRACTITIONER

## 2024-06-06 PROCEDURE — D9220A PRA ANESTHESIA: Mod: CRNA,,, | Performed by: NURSE ANESTHETIST, CERTIFIED REGISTERED

## 2024-06-06 PROCEDURE — 37000008 HC ANESTHESIA 1ST 15 MINUTES: Performed by: STUDENT IN AN ORGANIZED HEALTH CARE EDUCATION/TRAINING PROGRAM

## 2024-06-06 PROCEDURE — 99024 POSTOP FOLLOW-UP VISIT: CPT | Mod: ,,,

## 2024-06-06 DEVICE — EVEROLIMUS-ELUTING PLATINUM CHROMIUM CORONARY STENT SYSTEM
Type: IMPLANTABLE DEVICE | Site: CORONARY | Status: FUNCTIONAL
Brand: SYNERGY™ XD

## 2024-06-06 RX ORDER — METOCLOPRAMIDE HYDROCHLORIDE 5 MG/ML
10 INJECTION INTRAMUSCULAR; INTRAVENOUS EVERY 10 MIN PRN
OUTPATIENT
Start: 2024-06-06

## 2024-06-06 RX ORDER — FENTANYL CITRATE 50 UG/ML
INJECTION, SOLUTION INTRAMUSCULAR; INTRAVENOUS
Status: DISCONTINUED | OUTPATIENT
Start: 2024-06-06 | End: 2024-06-06

## 2024-06-06 RX ORDER — MUPIROCIN 20 MG/G
OINTMENT TOPICAL
Status: DISCONTINUED | OUTPATIENT
Start: 2024-06-06 | End: 2024-06-10 | Stop reason: HOSPADM

## 2024-06-06 RX ORDER — ALBUMIN HUMAN 250 G/1000ML
25 SOLUTION INTRAVENOUS ONCE
Status: COMPLETED | OUTPATIENT
Start: 2024-06-06 | End: 2024-06-06

## 2024-06-06 RX ORDER — ROCURONIUM BROMIDE 10 MG/ML
INJECTION, SOLUTION INTRAVENOUS
Status: DISCONTINUED | OUTPATIENT
Start: 2024-06-06 | End: 2024-06-06

## 2024-06-06 RX ORDER — CYCLOBENZAPRINE HCL 10 MG
10 TABLET ORAL 3 TIMES DAILY PRN
Status: DISCONTINUED | OUTPATIENT
Start: 2024-06-06 | End: 2024-06-10 | Stop reason: HOSPADM

## 2024-06-06 RX ORDER — MAGNESIUM SULFATE HEPTAHYDRATE 40 MG/ML
2 INJECTION, SOLUTION INTRAVENOUS ONCE
Status: COMPLETED | OUTPATIENT
Start: 2024-06-06 | End: 2024-06-06

## 2024-06-06 RX ORDER — CYCLOBENZAPRINE HCL 10 MG
10 TABLET ORAL ONCE
Status: DISCONTINUED | OUTPATIENT
Start: 2024-06-06 | End: 2024-06-06

## 2024-06-06 RX ORDER — CEFAZOLIN SODIUM 2 G/50ML
2 SOLUTION INTRAVENOUS
Status: DISCONTINUED | OUTPATIENT
Start: 2024-06-06 | End: 2024-06-10 | Stop reason: HOSPADM

## 2024-06-06 RX ORDER — ONDANSETRON HYDROCHLORIDE 2 MG/ML
INJECTION, SOLUTION INTRAMUSCULAR; INTRAVENOUS
Status: DISCONTINUED | OUTPATIENT
Start: 2024-06-06 | End: 2024-06-06

## 2024-06-06 RX ORDER — ETOMIDATE 2 MG/ML
INJECTION INTRAVENOUS
Status: DISCONTINUED | OUTPATIENT
Start: 2024-06-06 | End: 2024-06-06

## 2024-06-06 RX ORDER — PROPOFOL 10 MG/ML
VIAL (ML) INTRAVENOUS
Status: DISCONTINUED | OUTPATIENT
Start: 2024-06-06 | End: 2024-06-06

## 2024-06-06 RX ORDER — PHENYLEPHRINE HCL IN 0.9% NACL 1 MG/10 ML
SYRINGE (ML) INTRAVENOUS
Status: DISCONTINUED | OUTPATIENT
Start: 2024-06-06 | End: 2024-06-06

## 2024-06-06 RX ORDER — CYCLOBENZAPRINE HCL 10 MG
10 TABLET ORAL ONCE
Status: COMPLETED | OUTPATIENT
Start: 2024-06-06 | End: 2024-06-06

## 2024-06-06 RX ORDER — PANTOPRAZOLE SODIUM 40 MG/1
40 TABLET, DELAYED RELEASE ORAL DAILY
Status: DISCONTINUED | OUTPATIENT
Start: 2024-06-06 | End: 2024-06-10 | Stop reason: HOSPADM

## 2024-06-06 RX ORDER — LIDOCAINE HYDROCHLORIDE 10 MG/ML
INJECTION, SOLUTION EPIDURAL; INFILTRATION; INTRACAUDAL; PERINEURAL
Status: DISCONTINUED | OUTPATIENT
Start: 2024-06-06 | End: 2024-06-06 | Stop reason: HOSPADM

## 2024-06-06 RX ORDER — ONDANSETRON HYDROCHLORIDE 2 MG/ML
4 INJECTION, SOLUTION INTRAVENOUS DAILY PRN
OUTPATIENT
Start: 2024-06-06

## 2024-06-06 RX ORDER — LIDOCAINE HYDROCHLORIDE 20 MG/ML
INJECTION, SOLUTION EPIDURAL; INFILTRATION; INTRACAUDAL; PERINEURAL
Status: DISCONTINUED | OUTPATIENT
Start: 2024-06-06 | End: 2024-06-06

## 2024-06-06 RX ORDER — HEPARIN SODIUM 1000 [USP'U]/ML
INJECTION, SOLUTION INTRAVENOUS; SUBCUTANEOUS
Status: DISCONTINUED | OUTPATIENT
Start: 2024-06-06 | End: 2024-06-06

## 2024-06-06 RX ORDER — DIPHENHYDRAMINE HYDROCHLORIDE 50 MG/ML
25 INJECTION INTRAMUSCULAR; INTRAVENOUS EVERY 6 HOURS PRN
OUTPATIENT
Start: 2024-06-06

## 2024-06-06 RX ORDER — MIDAZOLAM HYDROCHLORIDE 1 MG/ML
INJECTION INTRAMUSCULAR; INTRAVENOUS
Status: DISCONTINUED | OUTPATIENT
Start: 2024-06-06 | End: 2024-06-06

## 2024-06-06 RX ADMIN — ROCURONIUM BROMIDE 10 MG: 10 SOLUTION INTRAVENOUS at 02:06

## 2024-06-06 RX ADMIN — HEPARIN SODIUM 2000 UNITS: 1000 INJECTION, SOLUTION INTRAVENOUS; SUBCUTANEOUS at 03:06

## 2024-06-06 RX ADMIN — CYCLOBENZAPRINE 10 MG: 10 TABLET, FILM COATED ORAL at 03:06

## 2024-06-06 RX ADMIN — CARVEDILOL 6.25 MG: 3.12 TABLET, FILM COATED ORAL at 08:06

## 2024-06-06 RX ADMIN — FENTANYL CITRATE 50 MCG: 50 INJECTION, SOLUTION INTRAMUSCULAR; INTRAVENOUS at 02:06

## 2024-06-06 RX ADMIN — CLOPIDOGREL BISULFATE 75 MG: 75 TABLET ORAL at 08:06

## 2024-06-06 RX ADMIN — CYCLOBENZAPRINE 10 MG: 10 TABLET, FILM COATED ORAL at 08:06

## 2024-06-06 RX ADMIN — SUGAMMADEX 300 MG: 100 INJECTION, SOLUTION INTRAVENOUS at 04:06

## 2024-06-06 RX ADMIN — PHENYLEPHRINE HYDROCHLORIDE 15 MCG/MIN: 10 INJECTION INTRAVENOUS at 03:06

## 2024-06-06 RX ADMIN — ETOMIDATE 20 MG: 2 INJECTION INTRAVENOUS at 02:06

## 2024-06-06 RX ADMIN — ROCURONIUM BROMIDE 50 MG: 10 SOLUTION INTRAVENOUS at 02:06

## 2024-06-06 RX ADMIN — PROPOFOL 50 MG: 10 INJECTION, EMULSION INTRAVENOUS at 02:06

## 2024-06-06 RX ADMIN — Medication 100 MCG: at 02:06

## 2024-06-06 RX ADMIN — SODIUM CHLORIDE: 9 INJECTION, SOLUTION INTRAVENOUS at 02:06

## 2024-06-06 RX ADMIN — MAGNESIUM SULFATE HEPTAHYDRATE 2 G: 40 INJECTION, SOLUTION INTRAVENOUS at 08:06

## 2024-06-06 RX ADMIN — MORPHINE SULFATE 2 MG: 4 INJECTION INTRAVENOUS at 06:06

## 2024-06-06 RX ADMIN — HEPARIN SODIUM 3000 UNITS: 1000 INJECTION, SOLUTION INTRAVENOUS; SUBCUTANEOUS at 02:06

## 2024-06-06 RX ADMIN — ONDANSETRON 4 MG: 2 INJECTION INTRAMUSCULAR; INTRAVENOUS at 04:06

## 2024-06-06 RX ADMIN — ALBUMIN (HUMAN) 100 ML: 12.5 SOLUTION INTRAVENOUS at 03:06

## 2024-06-06 RX ADMIN — ROCURONIUM BROMIDE 40 MG: 10 SOLUTION INTRAVENOUS at 02:06

## 2024-06-06 RX ADMIN — GABAPENTIN 100 MG: 100 CAPSULE ORAL at 08:06

## 2024-06-06 RX ADMIN — LIDOCAINE HYDROCHLORIDE 80 MG: 20 INJECTION, SOLUTION EPIDURAL; INFILTRATION; INTRACAUDAL; PERINEURAL at 02:06

## 2024-06-06 RX ADMIN — PANTOPRAZOLE SODIUM 40 MG: 40 TABLET, DELAYED RELEASE ORAL at 01:06

## 2024-06-06 RX ADMIN — HEPARIN SODIUM 10000 UNITS: 1000 INJECTION, SOLUTION INTRAVENOUS; SUBCUTANEOUS at 02:06

## 2024-06-06 RX ADMIN — ASPIRIN 81 MG: 81 TABLET, COATED ORAL at 08:06

## 2024-06-06 RX ADMIN — MORPHINE SULFATE 2 MG: 4 INJECTION INTRAVENOUS at 08:06

## 2024-06-06 RX ADMIN — ACETAMINOPHEN 650 MG: 325 TABLET, FILM COATED ORAL at 10:06

## 2024-06-06 RX ADMIN — MIDAZOLAM HYDROCHLORIDE 2 MG: 1 INJECTION, SOLUTION INTRAMUSCULAR; INTRAVENOUS at 02:06

## 2024-06-06 RX ADMIN — HYDRALAZINE HYDROCHLORIDE 10 MG: 20 INJECTION, SOLUTION INTRAMUSCULAR; INTRAVENOUS at 07:06

## 2024-06-06 NOTE — BRIEF OP NOTE
[facility] - Cath Lab Services  Brief Operative Note    SUMMARY     Surgery Date: 6/6/2024     Surgeons and Role:     * Almas Nettles Jr., MD - Primary    Assisting Surgeon: None    Pre-op Diagnosis:  Coronary artery disease involving native coronary artery of native heart, unspecified whether angina present [I25.10]    Post-op Diagnosis:  Post-Op Diagnosis Codes:     * Coronary artery disease involving native coronary artery of native heart, unspecified whether angina present [I25.10]    Procedure(s) (LRB):  Left heart cath (N/A)    Anesthesia: General    Implants:  Implant Name Type Inv. Item Serial No.  Lot No. LRB No. Used Action   STENT SYNERGY XD [id number] - [id number] stent coronary KALYANI - Balloon Exp STENT SYNERGY XD [id number]  [id number] Left 1 Implanted       Operative Findings: PCI of LM into LAD    Estimated Blood Loss: * No values recorded between 6/6/2024  2:42 PM and 6/6/2024  4:20 PM *    Estimated Blood Loss has been documented.         Specimens:   Specimen (24h ago, onward)      None            [id number]

## 2024-06-06 NOTE — PROGRESS NOTES
[facility] - [facility]    Cardiology  Progress Note    Patient Name: Carlos Waller  MRN: [id number]  Admission Date: 6/5/2024  Hospital Length of Stay: 1 days  Code Status: No Order   Attending Physician: Brant Flores MD   Primary Care Physician: Demetrius March RN  Expected Discharge Date:   Principal Problem:<principal problem not specified>    Subjective:   Brief HPI/Hospital Course:   Mr. Waller is a 68 year old male, known to Dr. Martino, who underwent elective outpatient MV PCI with Surgical Impella assist by Dr. Flores on 6.5.24. He underwent PCI of Radial Graft Supplying Native OM, Laser Atherectomy of LIMA to LAD with balloon angioplasty for ISR, and balloon angioplasty of distal Native LAD. Noted was failed attempt at PCI of Native RCA , however, patient did have evidence of left to right collaterals. Patient did lose some blood during the procedure, however, post procedure his H/H remained stable. Imeplla was kept in place, hemodynamics stable with no recurrent angina post intervention. Patient admitted to ICU for close monitoring and Impella management.    Hospital Course:  6.6.24: NAD Noted. SR in the 70's. BP Stable. Impella Left Axillary stable with some oozing of blood at insertion site (slow). Area is bandaged and being closely monitored. Stable at current time. P6 Support. PAP 30.6 (15), CVP 6, Adequate UO noted. Denies CP/SOB. Does report lower back pain related to lying flat, had some left forearm pain yesterday and throughout the night which has improved. Right Groin [place] site and bilateral groins soft flat with no evidence of bleed or hematoma noted. He is NPO for LM Intervention with Dr. Nettles today.     PMH: CAD/CABG, Abnormal Stress Test, Obesity, Anemia, ICMO, Severe HERMINIA, Hypertension, Hyperlipidemia, CVA  PSH: LHC, CABG, Hernia Repair, Colonoscopy  Family History: Father- Pancreatic Cancer, Mother- CVA  Social History: Tobacco- Negative, Alcohol- Negative,  Substance Abuse- Negative    Previous Diagnostics:  Limited Echocardiogram (6.5.24):  Limited study to assess impella placement. Impella measures approximately 4.1cm from aortic valve into the left ventricle. EF visually appears 30%.    C/RHC MV PCI (6.5.24):  Preprocedure diagnosis-abnormal stress test, surgical turndown, Class 3 angina, chronic systolic heart failure  Postprocedure diagnosis-Obstructive CAD  Estimated blood loss-40 cc  Tissue removal-none  Complications-none  Procedures performed:  Ultrasound-guided bilateral groin access  Coronary, SVG, LIMA angiography  Failed attempt at retrograde  PCI of RCA (unable to cannulate RCA with guide while 5.5 impella was in place)  Shockwave lithotripsy of LM and proximal LAD with 3.5 balloon  IVUS of LM, Radial graft to OM  PCI of ostial radial graft with 4x16 synergy KALYANI  Laser atherectomy of LIMA to LAD, PTCA for ISR with 2.5 NC balloon at 20 yesenia.  PTCA native distal LAD with 2.0 balloon.  Intracoronary nitro and adenosine administration to distal LAD  Perclose Right CFA, angioseal Left CFA  RHC  Findings:  Left main-80% calcified stenosis reduced to 60%   LAD-MID   LCX-  RCA-, Left to right collaterals  Radial graft to OM 90% reduced to 0% post PCI  LIMA to LAD-In stent  reduced to 10% post laser, PTA.  LAD is a bifid system distally  Pa sat pre pci 75, post 70  PAP 33/11 pre, 33/16 post    Impella Placement (Dr. Zuleta) (6.5.24):  5.5 Liter Placement with 10 mm Dacron Conduit via left axillary approach.    Echocardiogram (4.19.24):  Left Ventricle: Regional wall motion abnormalities present. The apical lateral, anterior and inferior walls are all hypokinetic. Basal and mid walls all augment WNL. There is moderately reduced systolic function with a visually estimated ejection fraction of 35 - 40%.  Tricuspid Valve: There is mild regurgitation.    Echocardiogram (11.9.23):  The study quality is below average.   The left ventricle is normal in  size. Global left ventricular systolic function is normal. The left ventricular ejection fraction is 55%. Left ventricular diastolic function is normal. Noted left ventricular hypertrophy. Concentric left ventricular hypertrophy is present. It is mild.   Optison, ultrasound image enhancement, was utilized on this study per standing order for better visualization of left ventricular endocardium.  The patients IV was started in the right arm by Luisana Dent RN. 2ml imaging enhancement during image acquisition, 1ml was wasted. The IV was removed upon completion of the study.   The right atrium is moderately enlarged. The left atrial diameter is mildly increased.  Mild (1+) mitral regurgitation. Mild (1+) tricuspid regurgitation. Mild (1+) pulmonic regurgitation.   The pulmonary artery systolic pressure is 12 mmHg.     CABG (7.16.99):  LIMA to LAD, Left Radial Artery to OM Branch, SVG to Diagonal Branch  Surgeon: Dr. Washington    Review of Systems   Cardiovascular:  Negative for chest pain.   Respiratory:  Negative for shortness of breath.    Musculoskeletal:  Positive for back pain.   All other systems reviewed and are negative.    Objective:     Vital Signs (Most Recent):  Temp: 98.3 °F (36.8 °C) (06/06/24 0800)  Pulse: 72 (06/06/24 0815)  Resp: 20 (06/06/24 0815)  BP: (!) 121/55 (06/06/24 0820)  SpO2: 97 % (06/06/24 0815) Vital Signs (24h Range):  Temp:  [97.7 °F (36.5 °C)-98.3 °F (36.8 °C)] 98.3 °F (36.8 °C)  Pulse:  [58-84] 72  Resp:  [10-27] 20  SpO2:  [93 %-100 %] 97 %  BP: (102-165)/(55-93) 121/55  Arterial Line BP: ()/() 120/55   Weight: 121.5 kg (267 lb 13.7 oz)  Body mass index is 38.43 kg/m².  SpO2: 97 %       Intake/Output Summary (Last 24 hours) at 6/6/2024 0823  Last data filed at 6/6/2024 0600  Gross per 24 hour   Intake 2100 ml   Output 1185 ml   Net 915 ml     Lines/Drains/Airways       Drain  Duration                  Urethral Catheter 06/05/24 0728 Temperature probe 16 Fr. 1 day               Arterial Line  Duration             Arterial Line 06/05/24 0649 Right Radial 1 day              Line  Duration                  VAD 06/05/24 1000 Left ventricular assist device Impella <1 day              Peripheral Intravenous Line  Duration                  Peripheral IV - Single Lumen 06/05/24 0602 18 G Posterior;Right Forearm 1 day         Peripheral IV - Single Lumen 06/05/24 0709 18 G Left Hand 1 day         Sheath 06/05/24 1006 Right anterior;proximal <1 day                  Significant Labs:   Recent Results (from the past 72 hour(s))   POCT glucose    Collection Time: 06/05/24  6:00 AM   Result Value Ref Range    POCT Glucose 91 70 - 110 mg/dL   ISTAT PROCEDURE    Collection Time: 06/05/24  1:33 PM   Result Value Ref Range    POC PH 7.391 7.35 - 7.45    POC PCO2 40.1 35 - 45 mmHg    POC PO2 149 (H) 80 - 100 mmHg    POC HCO3 24.3 24 - 28 mmol/L    POC BE -1 -2 to 2 mmol/L    POC SATURATED O2 99 95 - 100 %    POC Glucose 127 (H) 70 - 110 mg/dL    POC Sodium 136 136 - 145 mmol/L    POC Potassium 3.9 3.5 - 5.1 mmol/L    POC TCO2 26 23 - 27 mmol/L    POC Ionized Calcium 1.30 1.06 - 1.42 mmol/L    POC Hematocrit 39 36 - 54 %PCV    POC HEMOGLOBIN 13 g/dL    Sample ARTERIAL    Type & Screen    Collection Time: 06/05/24  4:19 PM   Result Value Ref Range    Group & Rh O NEG     Indirect Julián GEL NEG     Specimen Outdate 06/08/2024 23:59    Comprehensive Metabolic Panel    Collection Time: 06/05/24  4:19 PM   Result Value Ref Range    Sodium 137 136 - 145 mmol/L    Potassium 3.9 3.5 - 5.1 mmol/L    Chloride 106 98 - 107 mmol/L    CO2 21 (L) 23 - 31 mmol/L    Glucose 164 (H) 82 - 115 mg/dL    Blood Urea Nitrogen 17.6 8.4 - 25.7 mg/dL    Creatinine 0.80 0.73 - 1.18 mg/dL    Calcium 8.5 (L) 8.8 - 10.0 mg/dL    Protein Total 5.8 5.8 - 7.6 gm/dL    Albumin 3.5 3.4 - 4.8 g/dL    Globulin 2.3 (L) 2.4 - 3.5 gm/dL    Albumin/Globulin Ratio 1.5 1.1 - 2.0 ratio    Bilirubin Total 2.2 (H) <=1.5 mg/dL    ALP 52 40 - 150 unit/L     ALT 17 0 - 55 unit/L    AST 26 5 - 34 unit/L    eGFR >60 mL/min/1.73/m2    Anion Gap 10.0 mEq/L    BUN/Creatinine Ratio 22    CBC with Differential    Collection Time: 06/05/24  4:19 PM   Result Value Ref Range    WBC 14.73 (H) 4.50 - 11.50 x10(3)/mcL    RBC 4.29 (L) 4.70 - 6.10 x10(6)/mcL    Hgb 13.7 (L) 14.0 - 18.0 g/dL    Hct 40.8 (L) 42.0 - 52.0 %    MCV 95.1 (H) 80.0 - 94.0 fL    MCH 31.9 (H) 27.0 - 31.0 pg    MCHC 33.6 33.0 - 36.0 g/dL    RDW 12.6 11.5 - 17.0 %    Platelet 264 130 - 400 x10(3)/mcL    MPV 10.6 (H) 7.4 - 10.4 fL    Neut % 77.5 %    Lymph % 15.0 %    Mono % 6.5 %    Eos % 0.2 %    Basophil % 0.3 %    Lymph # 2.21 0.6 - 4.6 x10(3)/mcL    Neut # 11.42 (H) 2.1 - 9.2 x10(3)/mcL    Mono # 0.96 0.1 - 1.3 x10(3)/mcL    Eos # 0.03 0 - 0.9 x10(3)/mcL    Baso # 0.04 <=0.2 x10(3)/mcL    IG# 0.07 (H) 0 - 0.04 x10(3)/mcL    IG% 0.5 %    NRBC% 0.0 %   CBC with Differential    Collection Time: 06/05/24  5:17 PM   Result Value Ref Range    WBC 15.04 (H) 4.50 - 11.50 x10(3)/mcL    RBC 4.35 (L) 4.70 - 6.10 x10(6)/mcL    Hgb 13.9 (L) 14.0 - 18.0 g/dL    Hct 40.8 (L) 42.0 - 52.0 %    MCV 93.8 80.0 - 94.0 fL    MCH 32.0 (H) 27.0 - 31.0 pg    MCHC 34.1 33.0 - 36.0 g/dL    RDW 12.6 11.5 - 17.0 %    Platelet 276 130 - 400 x10(3)/mcL    MPV 10.2 7.4 - 10.4 fL    Neut % 81.5 %    Lymph % 11.8 %    Mono % 6.1 %    Eos % 0.1 %    Basophil % 0.2 %    Lymph # 1.77 0.6 - 4.6 x10(3)/mcL    Neut # 12.26 (H) 2.1 - 9.2 x10(3)/mcL    Mono # 0.92 0.1 - 1.3 x10(3)/mcL    Eos # 0.02 0 - 0.9 x10(3)/mcL    Baso # 0.03 <=0.2 x10(3)/mcL    IG# 0.04 0 - 0.04 x10(3)/mcL    IG% 0.3 %    NRBC% 0.0 %   Protime-INR    Collection Time: 06/05/24  5:17 PM   Result Value Ref Range    PT 14.5 12.5 - 14.5 seconds    INR 1.2 <=1.3   CK    Collection Time: 06/05/24  5:17 PM   Result Value Ref Range    Creatine Kinase 240 (H) 30 - 200 U/L   CK-MB    Collection Time: 06/05/24  5:17 PM   Result Value Ref Range    Creatine Kinase MB 3.6 <=7.2 ng/mL    APTT    Collection Time: 06/05/24  5:17 PM   Result Value Ref Range    PTT 93.8 (H) 23.2 - 33.7 seconds   Basic Metabolic Panel    Collection Time: 06/05/24  5:17 PM   Result Value Ref Range    Sodium 137 136 - 145 mmol/L    Potassium 4.0 3.5 - 5.1 mmol/L    Chloride 106 98 - 107 mmol/L    CO2 18 (L) 23 - 31 mmol/L    Glucose 150 (H) 82 - 115 mg/dL    Blood Urea Nitrogen 17.1 8.4 - 25.7 mg/dL    Creatinine 0.73 0.73 - 1.18 mg/dL    BUN/Creatinine Ratio 23     Calcium 8.6 (L) 8.8 - 10.0 mg/dL    Anion Gap 13.0 mEq/L    eGFR >60 mL/min/1.73/m2   EKG 12-LEAD on arrival to floor    Collection Time: 06/05/24  5:35 PM   Result Value Ref Range    QRS Duration 100 ms    [facility] QTC Calculation 468 ms   POCT glucose    Collection Time: 06/05/24 10:23 PM   Result Value Ref Range    POCT Glucose 133 (H) 70 - 110 mg/dL   RT Blood Gas    Collection Time: 06/05/24 11:18 PM   Result Value Ref Range    Sample Type Mixed Venous Blood     Sample site Arterial Line     Drawn by  rt     pH, Blood gas 7.480     pCO2, Blood gas 32.0 mmHg    pO2, Blood gas 66.0 mmHg    Sodium, Blood Gas 132 mmol/L    Potassium, Blood Gas 3.8 mmol/L    Calcium Level Ionized 1.14 1.12 - 1.32 mmol/L    TOC2, Blood gas 24.8 mmol/L    Base Excess, Blood gas 1.00 mmol/L    sO2, Blood gas 94.2 %    HCO3, Blood gas 23.8 mmol/L    THb, Blood gas 14.4 g/dL    O2 Hb, Blood Gas 93.6 %    CO Hgb 1.7 %    Met Hgb 1.3 %    Allens Test N/A    RT Blood Gas    Collection Time: 06/05/24 11:19 PM   Result Value Ref Range    Sample Type Venous Blood     Drawn by  rt     pH, Blood gas 7.430 7.300 - 7.600    pCO2, Blood gas 37.0 20.0 - 50.0 mmHg    pO2, Blood gas <38.0 mmHg    Sodium, Blood Gas 134 (L) 137 - 145 mmol/L    Potassium, Blood Gas 3.9 3.5 - 5.0 mmol/L    Calcium Level Ionized 1.15 1.12 - 1.23 mmol/L    TOC2, Blood gas 25.7 mmol/L    Base Excess, Blood gas 0.50 mmol/L    sO2, Blood gas 52.7 %    HCO3, Blood gas 24.6 mmol/L    THb, Blood gas 14.4 g/dL    O2 Hb,  Blood Gas 68.7 %    CO Hgb 2.1 %    Met Hgb 1.3 %   CBC with Differential    Collection Time: 06/06/24  2:03 AM   Result Value Ref Range    WBC 13.49 (H) 4.50 - 11.50 x10(3)/mcL    RBC 4.10 (L) 4.70 - 6.10 x10(6)/mcL    Hgb 13.2 (L) 14.0 - 18.0 g/dL    Hct 38.5 (L) 42.0 - 52.0 %    MCV 93.9 80.0 - 94.0 fL    MCH 32.2 (H) 27.0 - 31.0 pg    MCHC 34.3 33.0 - 36.0 g/dL    RDW 12.7 11.5 - 17.0 %    Platelet 224 130 - 400 x10(3)/mcL    MPV 10.1 7.4 - 10.4 fL    Neut % 82.1 %    Lymph % 8.4 %    Mono % 9.0 %    Eos % 0.0 %    Basophil % 0.2 %    Lymph # 1.13 0.6 - 4.6 x10(3)/mcL    Neut # 11.07 (H) 2.1 - 9.2 x10(3)/mcL    Mono # 1.22 0.1 - 1.3 x10(3)/mcL    Eos # 0.00 0 - 0.9 x10(3)/mcL    Baso # 0.03 <=0.2 x10(3)/mcL    IG# 0.04 0 - 0.04 x10(3)/mcL    IG% 0.3 %    NRBC% 0.0 %   Magnesium    Collection Time: 06/06/24  2:03 AM   Result Value Ref Range    Magnesium Level 1.70 1.60 - 2.60 mg/dL   Phosphorus    Collection Time: 06/06/24  2:03 AM   Result Value Ref Range    Phosphorus Level 3.6 2.3 - 4.7 mg/dL   Comprehensive Metabolic Panel    Collection Time: 06/06/24  2:03 AM   Result Value Ref Range    Sodium 135 (L) 136 - 145 mmol/L    Potassium 4.2 3.5 - 5.1 mmol/L    Chloride 106 98 - 107 mmol/L    CO2 20 (L) 23 - 31 mmol/L    Glucose 122 (H) 82 - 115 mg/dL    Blood Urea Nitrogen 15.4 8.4 - 25.7 mg/dL    Creatinine 0.72 (L) 0.73 - 1.18 mg/dL    Calcium 8.3 (L) 8.8 - 10.0 mg/dL    Protein Total 5.9 5.8 - 7.6 gm/dL    Albumin 3.4 3.4 - 4.8 g/dL    Globulin 2.5 2.4 - 3.5 gm/dL    Albumin/Globulin Ratio 1.4 1.1 - 2.0 ratio    Bilirubin Total 2.3 (H) <=1.5 mg/dL    ALP 49 40 - 150 unit/L    ALT 18 0 - 55 unit/L    AST 32 5 - 34 unit/L    eGFR >60 mL/min/1.73/m2    Anion Gap 9.0 mEq/L    BUN/Creatinine Ratio 21      EKG:      Telemetry:  Sinus Rhythm    Physical Exam  Vitals and nursing note reviewed.   Constitutional:       General: He is not in acute distress.     Appearance: Normal appearance. He is obese. He is not  ill-appearing.   HENT:      Head: Normocephalic.      Mouth/Throat:      Mouth: Mucous membranes are moist.      Pharynx: Oropharynx is clear.   Cardiovascular:      Rate and Rhythm: Normal rate and regular rhythm.      Pulses:           Dorsalis pedis pulses are 1+ on the right side and 1+ on the left side.      Comments: Left Hand and Arm Warm  Pulmonary:      Effort: Respiratory distress present.      Breath sounds: Normal breath sounds. No wheezing or rales.      Comments: NC  Abdominal:      Palpations: Abdomen is soft.   Genitourinary:     Comments: Brannon Catheter with Dark Urine  Musculoskeletal:         General: Normal range of motion.      Cervical back: Neck supple.      Right lower leg: No edema.      Left lower leg: No edema.      Comments: BLE Warm   Skin:     Comments: Bilateral Groins soft with no evidence of hematoma noted. Right Groin [place] in place, secured. Left Axillary Impella Site noted, with bloody drainage on dressing, no significant active bleeding noted, slow ooze, area re enforced with dressing.    Neurological:      General: No focal deficit present.      Mental Status: He is alert and oriented to person, place, and time. Mental status is at baseline.   Psychiatric:         Mood and Affect: Mood normal.         Behavior: Behavior normal.       Current Inpatient Medications:    Current Facility-Administered Medications:     0.9%  NaCl infusion (for blood administration), , Intravenous, Q24H PRN, Lon Zuleta MD    acetaminophen tablet 650 mg, 650 mg, Oral, Q4H PRN, Almas Vaz PA-C, 650 mg at 06/05/24 2002    aspirin EC tablet 81 mg, 81 mg, Oral, Daily, Brant Flores MD, 81 mg at 06/06/24 0820    carvediloL tablet 6.25 mg, 6.25 mg, Oral, BID, Brant Flores MD, 6.25 mg at 06/06/24 0820    clopidogreL tablet 75 mg, 75 mg, Oral, Daily, Brant Flores MD, 75 mg at 06/06/24 0820    diclofenac sodium 1 % gel 2 g, 2 g, Topical (Top), Daily PRN, Inés Corona DO, 2 g at  06/05/24 2002    gabapentin capsule 100 mg, 100 mg, Oral, BID, Brant Flores MD, 100 mg at 06/05/24 2002    heparin 25,000 units in dextrose 5% 250 mL (100 units/mL) infusion (heparin infusion - NO NOMOGRAM), 3 Units/kg/hr (Adjusted), Intravenous, Continuous, Almas Vaz PA-C    hydrALAZINE injection 10 mg, 10 mg, Intravenous, Q4H PRN, Brant Flores MD, 10 mg at 06/05/24 2229    magnesium sulfate 2g in water 50mL IVPB (premix), 2 g, Intravenous, Once, Briana Becerra, ROROP    morphine injection 2 mg, 2 mg, Intravenous, Q4H PRN, Robert Nash MD, 2 mg at 06/05/24 1915    NORepinephrine 8 mg (LEVOPHED) 8 mg/250 mL (32 mcg/mL) in dextrose 5% 250 mL infusion, , , ,     ondansetron disintegrating tablet 8 mg, 8 mg, Oral, Q8H PRN, Almas Vaz PA-C    sodium bicarbonate 25 mEq in dextrose 5 % (D5W) 1,000 mL infusion, , Intravenous, Continuous, Lon Zuleta MD    Facility-Administered Medications Ordered in Other Encounters:     diazePAM tablet 10 mg, 10 mg, Oral, On Call Procedure, Constantin Martino MD, 10 mg at 04/19/24 0729    diphenhydrAMINE capsule 50 mg, 50 mg, Oral, On Call Procedure, Constantin Martino MD, 50 mg at 07/29/22 0844    diphenhydrAMINE capsule 50 mg, 50 mg, Oral, On Call Procedure, Constantin Martino MD, 50 mg at 04/19/24 0730    sodium chloride 0.9% flush 10 mL, 10 mL, Intravenous, PRN, Constantin Martino MD    sodium chloride 0.9% flush 10 mL, 10 mL, Intravenous, PRN, Constantin Martino MD  VTE Risk Mitigation (From admission, onward)           Ordered     heparin 25,000 units in dextrose 5% 250 mL (100 units/mL) infusion (heparin infusion - NO NOMOGRAM)  Continuous         06/05/24 0924                  Assessment:   CAD/CABG (LIMA to LAD, LRA to OM, SVG to Diagonal Branch, 1999)    - Status Post MV PCI: PCI Ostial Radial Graft to OM (KALYANI), Sock Wave Lithotripsy of LM & Proximal LAD, Laser Atherectomy of LIMA to LAD & PTCA for ISR, PTCA Distal LAD, Failed Attempt at Retrograde  PCI  of RCA with 5.5 L Impella Support (6.5.24)  ICMO EF 30%  Hypertension (BP Stable)  Hyperlipidemia  HERMINIA (Severe)  Elevated BMI/Obesity  Anemia (Stable)  Leukocytosis    - Afebrile   Peripheral Neuropathy  History of CVA    Plan:   Continue DAPT (Aspirin 81 Mg Daily & Plavix 75 Mg Daily  Add Protonix for Gastric Protection  Continue Impella at P6 Support  Replete Mag  Keep NPO  Plan Restudy today with attempt at LM Intervention (with Impella Support)  Risks/Benefits/Alternatives of the C Discussed with the patient. He Elects to Proceed.    Briana Becerra, CELESTE  Cardiology  [facility] - [facility]  06/06/2024

## 2024-06-06 NOTE — NURSING
Nurses Note -- 4 Eyes      6/6/2024   4:22 AM      Skin assessed during: Daily Assessment      [x] No Altered Skin Integrity Present    [x]Prevention Measures Documented      [] Yes- Altered Skin Integrity Present or Discovered   [] LDA Added if Not in Epic (Describe Wound)   [] New Altered Skin Integrity was Present on Admit and Documented in LDA   [] Wound Image Taken    Wound Care Consulted? No    Attending Nurse:  Tyra Braden RN    Second RN/Staff Member:   Nat Mccoy RN

## 2024-06-06 NOTE — H&P
[facility] - [facility] ICU  Pulmonary Critical Care Note      Patient Name: Carlos Waller  MRN: [id number]  Admission Date: 6/5/2024  Hospital Length of Stay: 1 days  Code Status: No Order  Attending Provider: Brant Flores MD  Primary Care Provider: Demetrius March RN     Subjective:     HPI: Carlos Waller is a 68-year-old male with past medical history of CAD status post CABG x3 in 1999, hypertension, hyperlipidemia, arthritis, obesity was admitted to ICU after Impella placement for high-risk PCI today on  06/05/2024. Patient was extubated the PACU and is on 10 L OxyMask satting well at this time.  On my examination, patient is alert and responding appropriately.  Bleeding noted on the left axillary Impella site.     Hospital Course/Significant events:    Impella placement on 06/05/2024  24 Hour Interval History:   No acute events overnight.   VSS. Afebrile. On RA. I/O past 24h 2300/1185.   Impella at P6.     Past Medical History:   Diagnosis Date    Anemia, unspecified     Arthritis     Chest pain     Coronary artery disease     Coronary artery disease involving coronary bypass graft of native heart, unspecified whether angina present     Hyperlipidemia     Hypertension     Obesity     Right shoulder pain     Sleep apnea     Stroke     left side numbness       Past Surgical History:   Procedure Laterality Date    CARDIAC CATHETERIZATION  2016    COLONOSCOPY W/ POLYPECTOMY      CORONARY ANGIOGRAPHY INCLUDING BYPASS GRAFTS WITH CATHETERIZATION OF LEFT HEART Left 07/29/2022    Procedure: ANGIOGRAM, CORONARY, INCLUDING BYPASS GRAFT, WITH LEFT HEART CATHETERIZATION;  Surgeon: Constantin Martino MD;  Location: [facility] CATH LAB;  Service: Cardiology;  Laterality: Left;    CORONARY ARTERY BYPASS GRAFT  1999    CORONARY BYPASS GRAFT ANGIOGRAPHY  04/19/2024    Procedure: Bypass graft study;  Surgeon: Constantin Martino MD;  Location: [facility] CATH LAB;  Service: Cardiology;;    CORONARY STENT PLACEMENT      x2     HERNIA REPAIR      failed    LEFT HEART CATHETERIZATION Left 04/19/2024    Procedure: Left heart cath;  Surgeon: Constantin Martino MD;  Location: [facility] CATH LAB;  Service: Cardiology;  Laterality: Left;  LHC +/- PCI VIA RT FEMORAL ARTERY    PERCUTANEOUS TRANSLUMINAL BALLOON ANGIOPLASTY OF BYPASS GRAFT N/A 07/29/2022    Procedure: PTA, BYPASS GRAFT, USING BALLOON;  Surgeon: Constantin Martino MD;  Location: [facility] CATH LAB;  Service: Cardiology;  Laterality: N/A;       Review of patient's allergies indicates:   Allergen Reactions    Codeine Itching       Current Outpatient Medications   Medication Instructions    aspirin (ECOTRIN) 81 mg, Oral, Daily    carvediloL (COREG) 6.25 mg, Oral, 2 times daily    clopidogreL (PLAVIX) 75 mg, Oral, Daily    cyclobenzaprine (FLEXERIL) 10 mg, Oral, 3 times daily PRN    gabapentin (NEURONTIN) 100 mg, Oral, 2 times daily    isosorbide mononitrate (IMDUR) 30 mg, Oral, Daily    levocetirizine (XYZAL) 5 mg, Oral, Daily PRN    lisinopriL (PRINIVIL,ZESTRIL) 20 mg, Oral, Daily    meloxicam (MOBIC) 7.5 mg, Oral, Daily PRN    nitroGLYCERIN (NITROSTAT) 0.4 mg, Sublingual, Every 5 min PRN    omeprazole (PRILOSEC) 40 mg, Oral, Daily    ranolazine (RANEXA) 500 mg, Oral, 2 times daily    rosuvastatin (CRESTOR) 20 mg, Oral, Daily        Social History:     Social History     Socioeconomic History    Marital status: Single   Tobacco Use    Smoking status: Never    Smokeless tobacco: Never   Substance and Sexual Activity    Alcohol use: Yes     Alcohol/week: 10.0 standard drinks of alcohol     Types: 10 Cans of beer per week    Drug use: Not Currently       Family History   Problem Relation Name Age of Onset    Stroke Mother      Cancer Father         ROS ROS completed and negative except as indicated above.   Objective:     Vital Signs (Most Recent):  Temp: 98.3 °F (36.8 °C) (06/06/24 0800)  Pulse: 73 (06/06/24 0830)  Resp: (!) 22 (06/06/24 0830)  BP: 118/68 (06/06/24 0830)  SpO2: 97 % (06/06/24  0830)  Body mass index is 38.43 kg/m².  Weight: 121.5 kg (267 lb 13.7 oz) Vital Signs (24h Range):  Temp:  [97.7 °F (36.5 °C)-98.3 °F (36.8 °C)] 98.3 °F (36.8 °C)  Pulse:  [58-84] 73  Resp:  [10-27] 22  SpO2:  [93 %-100 %] 97 %  BP: (102-165)/(55-93) 118/68  Arterial Line BP: ()/() 124/56     Input/output::     Intake/Output Summary (Last 24 hours) at 6/6/2024 0839  Last data filed at 6/6/2024 0600  Gross per 24 hour   Intake 2100 ml   Output 1185 ml   Net 915 ml       Physical Exam:   GEN:  Alert, oriented x3   HEENT:  Insertion of Impella to the left axillary, bleeding noted at the site  CARDIO: regular rate, regular rhythm, normal S1, S2, no murmurs, rubs, clicks or gallops   PULM/CHEST: clear to auscultation bilaterally, no wheezes, rales or rhonchi, symmetric air entry, no accessory muscle use or distress  ABDOMEN: + BS, soft, non tender, non-distended, no guarding and no rebound. no masses or organomegaly   DERM: No rashes or lesions   EXT: peripheral pulses normal, no clubbing or cyanosis. No BLE edema.   NEURO: AAOx3, responding appropriately          Significant Labs:    Laboratory Studies:   Recent Labs   Lab 06/05/24  1333 06/05/24  2318 06/05/24  2319   PH 7.391   < > 7.430   PCO2 40.1   < > 37.0   PO2 149*   < > <38.0   HCO3 24.3   < > 24.6   POCSATURATED 99  --   --    BE -1  --   --     < > = values in this interval not displayed.     Recent Labs   Lab 06/06/24  0203   WBC 13.49*   RBC 4.10*   HGB 13.2*   HCT 38.5*      MCV 93.9   MCH 32.2*   MCHC 34.3     Recent Labs   Lab 06/06/24  0203   GLUCOSE 122*   *   K 4.2   CO2 20*   BUN 15.4   CREATININE 0.72*   CALCIUM 8.3*   MG 1.70           ABGs:    Recent Labs   Lab 06/05/24  2319   PH 7.430   PO2 <38.0   PCO2 37.0   HCO3 24.6   POCBASEDEF 0.50       Lines/Drains/Airways       Drain  Duration                  Urethral Catheter 06/05/24 0728 Temperature probe 16 Fr. <1 day              Arterial Line  Duration                   Sheath 06/05/24 1006 Right anterior;proximal <1 day    Arterial Line 06/05/24 0649 Right Radial <1 day                     Vent:       Current Medications:     Infusions:   heparin (porcine) in 5 % dex  3 Units/kg/hr (Adjusted) Intravenous Continuous        sodium bicarbonate 25 mEq in dextrose 5 % (D5W) 1,000 mL infusion   Intravenous Continuous             Scheduled:   aspirin  81 mg Oral Daily    carvediloL  6.25 mg Oral BID    clopidogreL  75 mg Oral Daily    gabapentin  100 mg Oral BID    magnesium sulfate IVPB  2 g Intravenous Once    NORepinephrine 8 mg             PRN:   aspirin EC tablet 81 mg    carvediloL tablet 6.25 mg    clopidogreL tablet 75 mg    gabapentin capsule 100 mg    magnesium sulfate 2g in water 50mL IVPB (premix)    NORepinephrine 8 mg (LEVOPHED) 8 mg/250 mL (32 mcg/mL) in dextrose 5% 250 mL infusion        Microbiology Data:  Microbiology Results (last 7 days)       ** No results found for the last 168 hours. **             Antibiotics and Day Number of Therapy:  Antibiotics (From admission, onward)      None             Estimated Creatinine Clearance: 126.6 mL/min (A) (based on SCr of 0.72 mg/dL (L)).    Imaging:  X-Ray Chest AP Portable  Narrative: EXAMINATION:  XR CHEST AP PORTABLE    CLINICAL HISTORY:  Impella;    COMPARISON:  Chest x-ray dated 05/28/2024    FINDINGS:  A ventricular assist device is in place.  The heart is stable in size.  There is no focal airspace consolidation.  There is no pleural effusion or visible pneumothorax.  Impression: No acute abnormality of the chest.    Electronically signed by: Yuly Bhatt  Date:    06/06/2024  Time:    06:09        2D ECHO Results    Results for orders placed during the hospital encounter of 04/19/24    Echo    Interpretation Summary    Left Ventricle: Regional wall motion abnormalities present. The apical lateral, anterior and inferior walls are all hypokinetic. Basal and mid walls all augment WNL. There is moderately reduced  "systolic function with a visually estimated ejection fraction of 35 - 40%.    Tricuspid Valve: There is mild regurgitation.      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          DVT Prophylaxis: heparin  GI prophylaxis: None   Keep HOB elevated > 30°  Maintain blood glucose levels 140-180    32 minutes of critical care was time spent personally by me on the following activities: development of treatment plan with patient or surrogate and bedside caregivers, discussions with consultants, evaluation of patient's response to treatment, examination of patient, ordering and performing treatments and interventions, ordering and review of laboratory studies, ordering and review of radiographic studies, pulse oximetry, re-evaluation of patient's condition.  This critical care time did not overlap with that of any other provider or involve time for any procedures.     Davie Mendoza, DO  Pulmonary/Critical Care  [facility] - [facility] ICU   "

## 2024-06-06 NOTE — ANESTHESIA PROCEDURE NOTES
Intubation    Date/Time: 6/6/2024 2:26 PM    Performed by: Anh Brannon CRNA  Authorized by: Bob Shah MD    Intubation:     Induction:  Intravenous    Intubated:  Postinduction    Mask Ventilation:  Easy with oral airway    Attempts:  1    Attempted By:  CRNA    Method of Intubation:  Direct    Blade:  Lockett 4    Laryngeal View Grade: Grade IIA - cords partially seen      Difficult Airway Encountered?: No      Complications:  None    Airway Device:  Oral endotracheal tube    Airway Device Size:  7.5    Style/Cuff Inflation:  Cuffed (inflated to minimal occlusive pressure)    Tube secured:  22    Secured at:  The lips    Placement Verified By:  Capnometry    Complicating Factors:  Obesity and anterior larynx    Findings Post-Intubation:  BS equal bilateral and atraumatic/condition of teeth unchanged

## 2024-06-06 NOTE — PROGRESS NOTES
CT SURGERY PROGRESS NOTE  Carlos Waller  68 y.o.  1955    Patients Procedure: Procedure(s) (LRB):  INSERTION, IMPELLA (N/A)    Subjective  Interval History: Patient is postoperative day 1 from left axillary impella placement.  Some oozing from surgical site yesterday afternoon with brief period of reported numbness to fingers of left hand.  Symptoms and oozing have subsided.  Patient is resting comfortably in bed, VSS, NAD, and oxygenating well on room air.  Impella at P-6. Plans to return to cath lab today for additional intervention.     Review of Systems   Constitutional:  Negative for chills, fever and malaise/fatigue.   Respiratory:  Negative for cough, shortness of breath and wheezing.    Cardiovascular:  Negative for chest pain and palpitations.   Gastrointestinal:  Negative for nausea and vomiting.   Musculoskeletal:  Positive for back pain.       Medication List  Infusions   heparin (porcine) in 5 % dex  3 Units/kg/hr (Adjusted) Intravenous Continuous        sodium bicarbonate 25 mEq in dextrose 5 % (D5W) 1,000 mL infusion   Intravenous Continuous         Scheduled   aspirin  81 mg Oral Daily    carvediloL  6.25 mg Oral BID    clopidogreL  75 mg Oral Daily    gabapentin  100 mg Oral BID    magnesium sulfate IVPB  2 g Intravenous Once       Objective:  Recent Vitals:  Temp:  [97.7 °F (36.5 °C)-98.3 °F (36.8 °C)] 98.3 °F (36.8 °C)  Pulse:  [58-84] 76  Resp:  [10-27] 16  SpO2:  [93 %-100 %] 97 %  BP: (102-165)/(55-93) 123/78  Arterial Line BP: ()/() 116/56    Physical Exam  Constitutional:       General: He is not in acute distress.     Appearance: He is obese. He is not ill-appearing.   HENT:      Head: Normocephalic and atraumatic.      Mouth/Throat:      Mouth: Mucous membranes are moist.      Pharynx: Oropharynx is clear.   Cardiovascular:      Rate and Rhythm: Normal rate and regular rhythm.      Pulses:           Dorsalis pedis pulses are 1+ on the right side and 1+ on the left side.       Heart sounds: Normal heart sounds. No murmur heard.     No friction rub. No gallop.   Pulmonary:      Effort: Pulmonary effort is normal. No respiratory distress.      Breath sounds: Normal breath sounds. No wheezing, rhonchi or rales.   Abdominal:      General: There is no distension.      Palpations: Abdomen is soft.   Musculoskeletal:         General: Normal range of motion.      Cervical back: Normal range of motion and neck supple.      Right lower leg: No edema.      Left lower leg: No edema.   Skin:     General: Skin is warm and dry.      Comments: Left axillary impella site dressed - bloody drainage noted.  Stable.  [place] secured to right groin.  Soft to palpation. No evidence of hematoma.  Left groin soft to palpation. NO evidence of hematoma.    Neurological:      Mental Status: He is alert.          I/O last 24 hrs:  Intake/Output - Last 3 Shifts         06/04 0700  06/05 0659 06/05 0700  06/06 0659 06/06 0700  06/07 0659    I.V. (mL/kg)   38.5 (0.3)    IV Piggyback  2300     Total Intake(mL/kg)  2300 (18.9) 38.5 (0.3)    Urine (mL/kg/hr)  1185 (0.4) 160 (0.4)    Total Output  1185 160    Net  +1115 -121.5                   Labs  CBC:   Recent Labs   Lab 06/06/24  0203   WBC 13.49*   RBC 4.10*   HGB 13.2*   HCT 38.5*      MCV 93.9   MCH 32.2*   MCHC 34.3     CMP:   Recent Labs   Lab 06/06/24  0203   CALCIUM 8.3*   ALBUMIN 3.4   *   K 4.2   CO2 20*      BUN 15.4   CREATININE 0.72*   ALKPHOS 49   ALT 18   AST 32   BILITOT 2.3*     Coagulation:   Recent Labs   Lab 06/05/24  1717   INR 1.2   APTT 93.8*     All pertinent labs from the last 24 hours have been reviewed.      Imaging:   CXR: X-Ray Chest AP Portable    Result Date: 6/6/2024  No acute abnormality of the chest. Electronically signed by: Yuly Bhatt Date:    06/06/2024 Time:    06:09   I have reviewed all pertinent imaging results/findings within the past 24 hours.        ASSESSMENT/PLAN:    Coronary artery disease  ICMO (EF  30%)  HTN  HLD  HERMINIA  Hx of CVA    -s/p left axillary impella placement and PCI  -Labs reviewed - as above.  All stable  -CXR stable  -Plans to return to cath lab today for additional intervention.  Impella removal tomorrow.  NPO after midnight tonight.      Case and plan of care discussed with CELESTE Jesus

## 2024-06-06 NOTE — PLAN OF CARE
06/06/24 1514   Discharge Assessment   Assessment Type Discharge Planning Assessment   Confirmed/corrected address, phone number and insurance Yes   Confirmed Demographics Correct on Facesheet   Source of Information patient   When was your last doctors appointment?   (2-3 months ago)   Communicated HASMUKH with patient/caregiver Date not available/Unable to determine   Reason For Admission CAD   People in Home alone   Do you expect to return to your current living situation? Yes   Do you have help at home or someone to help you manage your care at home? No   Prior to hospitilization cognitive status: Alert/Oriented   Current cognitive status: Alert/Oriented   Walking or Climbing Stairs Difficulty yes   Walking or Climbing Stairs ambulation difficulty, requires equipment   Mobility Management wheelchair   Dressing/Bathing Difficulty yes   Dressing/Bathing Management will need help   Equipment Currently Used at Home wheelchair   Readmission within 30 days? No   Patient currently being followed by outpatient case management? No   Do you currently have service(s) that help you manage your care at home? No   Do you take prescription medications? Yes   Do you have prescription coverage? Yes   Coverage [employer]   Do you have any problems affording any of your prescribed medications? No   Is the patient taking medications as prescribed? yes   Who is going to help you get home at discharge? Family   How do you get to doctors appointments? car, drives self   Are you on dialysis? No   Do you take coumadin? No   Discharge Plan A Home;Home Health   Discharge Plan B Other  (TBD)   DME Needed Upon Discharge  other (see comments)  (TBD)   Discharge Plan discussed with: Patient;Sibling   Name(s) and Number(s) Edgar Waller (Brother)  [phone]   Transition of Care Barriers Mobility   Physical Activity   On average, how many days per week do you engage in moderate to strenuous exercise (like a brisk walk)? 0 days   Financial  Resource Strain   How hard is it for you to pay for the very basics like food, housing, medical care, and heating? Somewhat   Housing Stability   In the last 12 months, was there a time when you were not able to pay the mortgage or rent on time? N   At any time in the past 12 months, were you homeless or living in a shelter (including now)? N   Transportation Needs   Has the lack of transportation kept you from medical appointments, meetings, work or from getting things needed for daily living? No   Food Insecurity   Within the past 12 months, you worried that your food would run out before you got the money to buy more. Never true   Within the past 12 months, the food you bought just didn't last and you didn't have money to get more. Never true   Stress   Do you feel stress - tense, restless, nervous, or anxious, or unable to sleep at night because your mind is troubled all the time - these days? Rather much   Social Isolation   How often do you feel lonely or isolated from those around you?  Sometimes   Alcohol Use   Q1: How often do you have a drink containing alcohol? 2-3 per wk   Q2: How many drinks containing alcohol do you have on a typical day when you are drinking? 5 or 6   Q3: How often do you have six or more drinks on one occasion? Weekly   Utilities   In the past 12 months has the electric, gas, oil, or water company threatened to shut off services in your home? No   Health Literacy   How often do you need to have someone help you when you read instructions, pamphlets, or other written material from your doctor or pharmacy? Never

## 2024-06-06 NOTE — TRANSFER OF CARE
"Anesthesia Transfer of Care Note    Patient: Carlos ALLEN [employer]    Procedure(s) Performed: Procedure(s) (LRB):  Left heart cath (N/A)    Patient location: ICU    Anesthesia Type: general    Transport from OR: Transported from OR on room air with adequate spontaneous ventilation    Post pain: adequate analgesia    Post assessment: no apparent anesthetic complications    Post vital signs: stable    Level of consciousness: awake, alert and oriented    Nausea/Vomiting: no nausea/vomiting    Complications: none    Transfer of care protocol was followedComments: Impella intact, VSS, AAO, NAAC, denies complaints, to ICU with cath lab staff      Last vitals: Visit Vitals  BP (!) 144/75   Pulse 77   Temp 36.5 °C (97.7 °F)   Resp 20   Ht 5' 10" (1.778 m)   Wt 121.5 kg (267 lb 13.7 oz)   SpO2 96%   BMI 38.43 kg/m²     "

## 2024-06-06 NOTE — NURSING
Nurses Note -- 4 Eyes      6/6/2024   10:41 AM      Skin assessed during: Daily Assessment      [x] No Altered Skin Integrity Present    [x]Prevention Measures Documented      [] Yes- Altered Skin Integrity Present or Discovered   [] LDA Added if Not in Epic (Describe Wound)   [] New Altered Skin Integrity was Present on Admit and Documented in LDA   [] Wound Image Taken    Wound Care Consulted? No    Attending Nurse:  Lea Alfaro RN/Staff Member:   SHARLA Arauz            Patient information on fall and injury prevention

## 2024-06-06 NOTE — ANESTHESIA POSTPROCEDURE EVALUATION
Anesthesia Post Evaluation    Patient: Carlos Waller    Procedure(s) Performed: Procedure(s) (LRB):  Left heart cath (N/A)    Final Anesthesia Type: general      Patient location during evaluation: PACU  Patient participation: Yes- Able to Participate  Level of consciousness: awake and alert and oriented  Post-procedure vital signs: reviewed and stable  Pain management: adequate  Airway patency: patent    PONV status at discharge: No PONV  Anesthetic complications: no      Cardiovascular status: hemodynamically stable  Respiratory status: unassisted  Hydration status: euvolemic  Follow-up not needed.              Vitals Value Taken Time   /82 06/06/24 1832   Temp 36.8 06/06/24 1832   Pulse 86 06/06/24 1832   Resp 18 06/06/24 1832   SpO2 97 % 06/06/24 1832   Vitals shown include unfiled device data.      No case tracking events are documented in the log.      Pain/Laura Score: Pain Rating Prior to Med Admin: 6 (6/6/2024  6:23 PM)  Pain Rating Post Med Admin: 2 (6/6/2024  8:58 AM)  Laura Score: 10 (6/6/2024  7:20 AM)

## 2024-06-06 NOTE — ANESTHESIA PREPROCEDURE EVALUATION
06/06/2024  Carlos Waller is a 68 y.o., male presents with Severe CAD now for Left heart Cath..He comes now with stable angina and a recent left heart catheterization which reveals severe three-vessel coronary artery disease and moderate to severely depressed left ventricular function (30%).  CAD status post CABG x3 in 1999, hypertension, hyperlipidemia, arthritis, obesity was admitted to ICU after Impella placement for high-risk PCI today on 06/05/2024     Assessment:  Severe triple-vessel CAD and non-amenable to surgical revascularization, Impella placement for high-risk PCI  History of CABG x3 in 1999  Impella site bleeding  Acute on chronic systolic and diastolic heart failure with EF of 30%   Obesity  Hypertension  Hyperlipidemia  Sleep apnea  CVA     Diagnosis: Coronary artery disease involving native coronary artery of native heart, unspecified whether angina present [I25.10]   Pre-op diagnosis: Coronary artery disease involving native coronary artery of native heart, unspecified whether angina present [I25.10]       For High risk PCI today under GETA.  Case: [id number] Date/Time: 06/06/24 1400   Procedure: Left heart cath (Chest)   Anesthesia type: General       PMHx:  Past Medical History:   Diagnosis Date    Anemia, unspecified      Arthritis      Chest pain      Coronary artery disease      Coronary artery disease involving coronary bypass graft of native heart, unspecified whether angina present      Hyperlipidemia      Hypertension      Obesity      Right shoulder pain      Sleep apnea      Stroke       left side numbness               Past Surgical History:   Procedure Laterality Date    CARDIAC CATHETERIZATION   2016    COLONOSCOPY W/ POLYPECTOMY        CORONARY ANGIOGRAPHY INCLUDING BYPASS GRAFTS WITH CATHETERIZATION OF LEFT HEART Left 07/29/2022     Procedure: ANGIOGRAM, CORONARY, INCLUDING  BYPASS GRAFT, WITH LEFT HEART CATHETERIZATION;  Surgeon: Constantin Maritno MD;  Location: [facility] CATH LAB;  Service: Cardiology;  Laterality: Left;    CORONARY ARTERY BYPASS GRAFT   1999    CORONARY BYPASS GRAFT ANGIOGRAPHY   04/19/2024     Procedure: Bypass graft study;  Surgeon: Constantin Martino MD;  Location: [facility] CATH LAB;  Service: Cardiology;;    CORONARY STENT PLACEMENT         x2    HERNIA REPAIR         failed    LEFT HEART CATHETERIZATION Left 04/19/2024     Procedure: Left heart cath;  Surgeon: Constantin Martino MD;  Location: [facility] CATH LAB;  Service: Cardiology;  Laterality: Left;  LHC +/- PCI VIA RT FEMORAL ARTERY    PERCUTANEOUS TRANSLUMINAL BALLOON ANGIOPLASTY OF BYPASS GRAFT N/A 07/29/2022     Procedure: PTA, BYPASS GRAFT, USING BALLOON;  Surgeon: Constantin Martino MD;  Location: [facility] CATH LAB;  Service: Cardiology;  Laterality: N/A;             Vital signs:        Lab Data:  Lab 06/06/24  0203   WBC 13.49*   RBC 4.10*   HGB 13.2*   HCT 38.5*      MCV 93.9   MCH 32.2*   MCHC 34.3          Recent Labs   Lab 06/06/24  0203   GLUCOSE 122*   *   K 4.2   CO2 20*   BUN 15.4   CREATININE 0.72*   CALCIUM 8.3*   MG 1.70         Echo:  Echo     Interpretation Summary    Left Ventricle: Regional wall motion abnormalities present. The apical lateral, anterior and inferior walls are all hypokinetic. Basal and mid walls all augment WNL. There is moderately reduced systolic function with a visually estimated ejection fraction of 35 - 40%.    Tricuspid Valve: There is mild regurgitation.       EKG:            Pre-op Assessment    I have reviewed the Patient Summary Reports.     I have reviewed the Nursing Notes. I have reviewed the NPO Status.   I have reviewed the Medications.     Review of Systems  Anesthesia Hx:  No problems with previous Anesthesia                Social:  Non-Smoker       Hematology/Oncology:  Hematology Normal   Oncology Normal                                   EENT/Dental:  EENT/Dental  Normal           Cardiovascular:  Exercise tolerance: poor   Hypertension   CAD       CHF (EF: 30%, Impella placed, 06/05/24.)         Functional Capacity good / => 4 METS                         Pulmonary:        Sleep Apnea                Renal/:  Renal/ Normal                 Hepatic/GI:  Hepatic/GI Normal                 Musculoskeletal:  Musculoskeletal Normal                Neurological:   CVA                                    Endocrine:  Endocrine Normal          Obesity / BMI > 30  Dermatological:  Skin Normal    Psych:  Psychiatric Normal                    Physical Exam  General: Alert, Oriented, Well nourished and Cooperative    Airway:  Mallampati: II   Mouth Opening: Normal  TM Distance: Normal  Tongue: Normal  Neck ROM: Normal ROM    Dental:  Intact    Chest/Lungs:  Clear to auscultation, Normal Respiratory Rate    Heart:  Rate: Normal  Rhythm: Regular Rhythm        Anesthesia Plan  Type of Anesthesia, risks & benefits discussed:    Anesthesia Type: Gen ETT  Intra-op Monitoring Plan: Standard ASA Monitors  Post Op Pain Control Plan: IV/PO Opioids PRN  Induction:  IV and Inhalation  Airway Plan: Direct  Informed Consent: Informed consent signed with the Patient and all parties understand the risks and agree with anesthesia plan.  All questions answered. Patient consented to blood products? Yes  ASA Score: 3  Day of Surgery Review of History & Physical: H&P Update referred to the surgeon/provider.    Ready For Surgery From Anesthesia Perspective.     .

## 2024-06-07 ENCOUNTER — ANESTHESIA (OUTPATIENT)
Dept: SURGERY | Facility: HOSPITAL | Age: 69
DRG: 215 | End: 2024-06-07
Payer: MEDICARE

## 2024-06-07 ENCOUNTER — ANESTHESIA EVENT (OUTPATIENT)
Dept: SURGERY | Facility: HOSPITAL | Age: 69
DRG: 215 | End: 2024-06-07
Payer: MEDICARE

## 2024-06-07 LAB
ALBUMIN SERPL-MCNC: 3.3 G/DL (ref 3.4–4.8)
ALBUMIN/GLOB SERPL: 1.7 RATIO (ref 1.1–2)
ALLENS TEST BLOOD GAS (OHS): ABNORMAL
ALLENS TEST BLOOD GAS (OHS): ABNORMAL
ALP SERPL-CCNC: 45 UNIT/L (ref 40–150)
ALT SERPL-CCNC: 13 UNIT/L (ref 0–55)
ANION GAP SERPL CALC-SCNC: 7 MEQ/L
AST SERPL-CCNC: 23 UNIT/L (ref 5–34)
BASE EXCESS BLD CALC-SCNC: -0.1 MMOL/L (ref -2–2)
BASE EXCESS BLD CALC-SCNC: -1 MMOL/L
BASE EXCESS BLD CALC-SCNC: -2.2 MMOL/L (ref -2–2)
BASE EXCESS BLD CALC-SCNC: 0.1 MMOL/L
BASOPHILS # BLD AUTO: 0.02 X10(3)/MCL
BASOPHILS NFR BLD AUTO: 0.2 %
BILIRUB SERPL-MCNC: 2.2 MG/DL
BLOOD GAS SAMPLE TYPE (OHS): ABNORMAL
BLOOD GAS SAMPLE TYPE (OHS): NORMAL
BUN SERPL-MCNC: 11.7 MG/DL (ref 8.4–25.7)
CA-I BLD-SCNC: 1.1 MMOL/L (ref 1.12–1.23)
CA-I BLD-SCNC: 1.12 MMOL/L (ref 1.12–1.23)
CA-I BLD-SCNC: 1.14 MMOL/L (ref 1.12–1.23)
CA-I BLD-SCNC: 1.17 MMOL/L (ref 1.12–1.32)
CALCIUM SERPL-MCNC: 7.8 MG/DL (ref 8.8–10)
CHLORIDE SERPL-SCNC: 108 MMOL/L (ref 98–107)
CO2 BLDA-SCNC: 23.5 MMOL/L
CO2 BLDA-SCNC: 25 MMOL/L
CO2 BLDA-SCNC: 25.6 MMOL/L
CO2 BLDA-SCNC: 26.7 MMOL/L
CO2 SERPL-SCNC: 21 MMOL/L (ref 23–31)
COHGB MFR BLDA: 1.5 % (ref 0.5–1.5)
COHGB MFR BLDA: 1.9 % (ref 0.5–1.5)
COHGB MFR BLDA: 2.1 %
COHGB MFR BLDA: 2.5 %
CREAT SERPL-MCNC: 0.68 MG/DL (ref 0.73–1.18)
CREAT/UREA NIT SERPL: 17
DRAWN BY BLOOD GAS (OHS): ABNORMAL
DRAWN BY BLOOD GAS (OHS): NORMAL
EOSINOPHIL # BLD AUTO: 0.04 X10(3)/MCL (ref 0–0.9)
EOSINOPHIL NFR BLD AUTO: 0.4 %
ERYTHROCYTE [DISTWIDTH] IN BLOOD BY AUTOMATED COUNT: 12.8 % (ref 11.5–17)
GFR SERPLBLD CREATININE-BSD FMLA CKD-EPI: >60 ML/MIN/1.73/M2
GLOBULIN SER-MCNC: 2 GM/DL (ref 2.4–3.5)
GLUCOSE SERPL-MCNC: 102 MG/DL (ref 82–115)
HCO3 BLDA-SCNC: 22.4 MMOL/L (ref 22–26)
HCO3 BLDA-SCNC: 23.9 MMOL/L (ref 22–26)
HCO3 BLDA-SCNC: 24.3 MMOL/L
HCO3 BLDA-SCNC: 25.4 MMOL/L
HCT VFR BLD AUTO: 31.9 % (ref 42–52)
HGB BLD-MCNC: 10.7 G/DL (ref 14–18)
IMM GRANULOCYTES # BLD AUTO: 0.03 X10(3)/MCL (ref 0–0.04)
IMM GRANULOCYTES NFR BLD AUTO: 0.3 %
LPM (OHS): 3
LYMPHOCYTES # BLD AUTO: 1.38 X10(3)/MCL (ref 0.6–4.6)
LYMPHOCYTES NFR BLD AUTO: 13.5 %
MAGNESIUM SERPL-MCNC: 1.9 MG/DL (ref 1.6–2.6)
MCH RBC QN AUTO: 32.3 PG (ref 27–31)
MCHC RBC AUTO-ENTMCNC: 33.5 G/DL (ref 33–36)
MCV RBC AUTO: 96.4 FL (ref 80–94)
METHGB MFR BLDA: 0.7 %
METHGB MFR BLDA: 1.1 % (ref 0.4–1.5)
METHGB MFR BLDA: 1.2 %
METHGB MFR BLDA: 1.2 % (ref 0.4–1.5)
MONOCYTES # BLD AUTO: 1.21 X10(3)/MCL (ref 0.1–1.3)
MONOCYTES NFR BLD AUTO: 11.9 %
MRSA PCR SCRN (OHS): NOT DETECTED
NEUTROPHILS # BLD AUTO: 7.51 X10(3)/MCL (ref 2.1–9.2)
NEUTROPHILS NFR BLD AUTO: 73.7 %
NRBC BLD AUTO-RTO: 0 %
O2 HB BLOOD GAS (OHS): 69.7 %
O2 HB BLOOD GAS (OHS): 72.6 %
O2 HB BLOOD GAS (OHS): 95.4 % (ref 94–97)
O2 HB BLOOD GAS (OHS): 96.2 % (ref 94–97)
OHS QRS DURATION: 86 MS
OHS QTC CALCULATION: 445 MS
OXYGEN DEVICE BLOOD GAS (OHS): ABNORMAL
OXYGEN DEVICE BLOOD GAS (OHS): ABNORMAL
OXYGEN DEVICE BLOOD GAS (OHS): NORMAL
OXYHGB MFR BLDA: 10.2 G/DL
OXYHGB MFR BLDA: 10.2 G/DL (ref 12–16)
OXYHGB MFR BLDA: 11.3 G/DL
OXYHGB MFR BLDA: 11.5 G/DL (ref 12–16)
PCO2 BLDA: 36 MMHG (ref 35–45)
PCO2 BLDA: 37 MMHG (ref 35–45)
PCO2 BLDA: 42 MMHG
PCO2 BLDA: 43 MMHG (ref 20–50)
PH BLDA: 7.37 [PH]
PH BLDA: 7.38 [PH] (ref 7.3–7.6)
PH BLDA: 7.39 [PH] (ref 7.35–7.45)
PH BLDA: 7.43 [PH] (ref 7.35–7.45)
PLATELET # BLD AUTO: 165 X10(3)/MCL (ref 130–400)
PMV BLD AUTO: 10.1 FL (ref 7.4–10.4)
PO2 BLDA: 143 MMHG (ref 80–100)
PO2 BLDA: 99 MMHG (ref 80–100)
PO2 BLDA: <38 MMHG
PO2 BLDA: <38 MMHG
POCT GLUCOSE: 97 MG/DL (ref 70–110)
POTASSIUM BLOOD GAS (OHS): 3.5 MMOL/L
POTASSIUM BLOOD GAS (OHS): 3.5 MMOL/L (ref 3.5–5)
POTASSIUM BLOOD GAS (OHS): 3.9 MMOL/L (ref 3.5–5)
POTASSIUM BLOOD GAS (OHS): 3.9 MMOL/L (ref 3.5–5)
POTASSIUM SERPL-SCNC: 3.7 MMOL/L (ref 3.5–5.1)
PROT SERPL-MCNC: 5.3 GM/DL (ref 5.8–7.6)
RBC # BLD AUTO: 3.31 X10(6)/MCL (ref 4.7–6.1)
SAMPLE SITE BLOOD GAS (OHS): ABNORMAL
SAMPLE SITE BLOOD GAS (OHS): ABNORMAL
SAO2 % BLDA: 59.3 %
SAO2 % BLDA: 67.6 %
SAO2 % BLDA: 97.9 %
SAO2 % BLDA: 99.2 %
SODIUM BLOOD GAS (OHS): 133 MMOL/L (ref 137–145)
SODIUM BLOOD GAS (OHS): 133 MMOL/L (ref 137–145)
SODIUM BLOOD GAS (OHS): 134 MMOL/L (ref 137–145)
SODIUM BLOOD GAS (OHS): 136 MMOL/L
SODIUM SERPL-SCNC: 136 MMOL/L (ref 136–145)
WBC # SPEC AUTO: 10.19 X10(3)/MCL (ref 4.5–11.5)

## 2024-06-07 PROCEDURE — 25000003 PHARM REV CODE 250: Performed by: PHYSICIAN ASSISTANT

## 2024-06-07 PROCEDURE — 99900035 HC TECH TIME PER 15 MIN (STAT)

## 2024-06-07 PROCEDURE — 82803 BLOOD GASES ANY COMBINATION: CPT

## 2024-06-07 PROCEDURE — 25000003 PHARM REV CODE 250: Performed by: INTERNAL MEDICINE

## 2024-06-07 PROCEDURE — 27000221 HC OXYGEN, UP TO 24 HOURS

## 2024-06-07 PROCEDURE — 63600175 PHARM REV CODE 636 W HCPCS: Performed by: PHYSICIAN ASSISTANT

## 2024-06-07 PROCEDURE — 27201423 OPTIME MED/SURG SUP & DEVICES STERILE SUPPLY: Performed by: THORACIC SURGERY (CARDIOTHORACIC VASCULAR SURGERY)

## 2024-06-07 PROCEDURE — 94760 N-INVAS EAR/PLS OXIMETRY 1: CPT | Mod: XB

## 2024-06-07 PROCEDURE — 25000003 PHARM REV CODE 250

## 2024-06-07 PROCEDURE — 63600175 PHARM REV CODE 636 W HCPCS

## 2024-06-07 PROCEDURE — 33990 INSJ PERQ VAD L HRT ARTERIAL: CPT | Mod: ,,, | Performed by: THORACIC SURGERY (CARDIOTHORACIC VASCULAR SURGERY)

## 2024-06-07 PROCEDURE — 80053 COMPREHEN METABOLIC PANEL: CPT | Performed by: NURSE PRACTITIONER

## 2024-06-07 PROCEDURE — 33990 INSJ PERQ VAD L HRT ARTERIAL: CPT | Mod: AS,,, | Performed by: PHYSICIAN ASSISTANT

## 2024-06-07 PROCEDURE — 37000009 HC ANESTHESIA EA ADD 15 MINS: Performed by: THORACIC SURGERY (CARDIOTHORACIC VASCULAR SURGERY)

## 2024-06-07 PROCEDURE — 85025 COMPLETE CBC W/AUTO DIFF WBC: CPT | Performed by: NURSE PRACTITIONER

## 2024-06-07 PROCEDURE — 87641 MR-STAPH DNA AMP PROBE: CPT | Performed by: INTERNAL MEDICINE

## 2024-06-07 PROCEDURE — 86923 COMPATIBILITY TEST ELECTRIC: CPT

## 2024-06-07 PROCEDURE — 34716 OPN AX/SUBCLA ART EXPOS CNDT: CPT | Mod: LT,,, | Performed by: THORACIC SURGERY (CARDIOTHORACIC VASCULAR SURGERY)

## 2024-06-07 PROCEDURE — 36000713 HC OR TIME LEV V EA ADD 15 MIN: Performed by: THORACIC SURGERY (CARDIOTHORACIC VASCULAR SURGERY)

## 2024-06-07 PROCEDURE — 25000003 PHARM REV CODE 250: Performed by: STUDENT IN AN ORGANIZED HEALTH CARE EDUCATION/TRAINING PROGRAM

## 2024-06-07 PROCEDURE — 25000003 PHARM REV CODE 250: Performed by: NURSE PRACTITIONER

## 2024-06-07 PROCEDURE — 34716 OPN AX/SUBCLA ART EXPOS CNDT: CPT | Mod: AS,LT,, | Performed by: PHYSICIAN ASSISTANT

## 2024-06-07 PROCEDURE — 37000008 HC ANESTHESIA 1ST 15 MINUTES: Performed by: THORACIC SURGERY (CARDIOTHORACIC VASCULAR SURGERY)

## 2024-06-07 PROCEDURE — 93010 ELECTROCARDIOGRAM REPORT: CPT | Mod: 59,,, | Performed by: INTERNAL MEDICINE

## 2024-06-07 PROCEDURE — 83735 ASSAY OF MAGNESIUM: CPT | Performed by: NURSE PRACTITIONER

## 2024-06-07 PROCEDURE — 93005 ELECTROCARDIOGRAM TRACING: CPT

## 2024-06-07 PROCEDURE — 36000712 HC OR TIME LEV V 1ST 15 MIN: Performed by: THORACIC SURGERY (CARDIOTHORACIC VASCULAR SURGERY)

## 2024-06-07 PROCEDURE — 63600175 PHARM REV CODE 636 W HCPCS: Mod: JZ,JG | Performed by: THORACIC SURGERY (CARDIOTHORACIC VASCULAR SURGERY)

## 2024-06-07 PROCEDURE — 99024 POSTOP FOLLOW-UP VISIT: CPT | Mod: ,,, | Performed by: PHYSICIAN ASSISTANT

## 2024-06-07 PROCEDURE — 36415 COLL VENOUS BLD VENIPUNCTURE: CPT | Performed by: NURSE PRACTITIONER

## 2024-06-07 PROCEDURE — 20000000 HC ICU ROOM

## 2024-06-07 PROCEDURE — 37799 UNLISTED PX VASCULAR SURGERY: CPT

## 2024-06-07 PROCEDURE — 02PA3RZ REMOVAL OF SHORT-TERM EXTERNAL HEART ASSIST SYSTEM FROM HEART, PERCUTANEOUS APPROACH: ICD-10-PCS | Performed by: THORACIC SURGERY (CARDIOTHORACIC VASCULAR SURGERY)

## 2024-06-07 RX ORDER — GLYCOPYRROLATE 0.2 MG/ML
INJECTION INTRAMUSCULAR; INTRAVENOUS
Status: DISCONTINUED | OUTPATIENT
Start: 2024-06-07 | End: 2024-06-07

## 2024-06-07 RX ORDER — PHENYLEPHRINE HCL IN 0.9% NACL 1 MG/10 ML
SYRINGE (ML) INTRAVENOUS
Status: DISCONTINUED | OUTPATIENT
Start: 2024-06-07 | End: 2024-06-07

## 2024-06-07 RX ORDER — OXYCODONE AND ACETAMINOPHEN 5; 325 MG/1; MG/1
1 TABLET ORAL EVERY 4 HOURS PRN
Status: DISCONTINUED | OUTPATIENT
Start: 2024-06-07 | End: 2024-06-10 | Stop reason: HOSPADM

## 2024-06-07 RX ORDER — KETAMINE HYDROCHLORIDE 100 MG/ML
INJECTION, SOLUTION INTRAMUSCULAR; INTRAVENOUS
Status: DISCONTINUED | OUTPATIENT
Start: 2024-06-07 | End: 2024-06-07

## 2024-06-07 RX ORDER — POTASSIUM CHLORIDE 20 MEQ/1
40 TABLET, EXTENDED RELEASE ORAL ONCE
Status: COMPLETED | OUTPATIENT
Start: 2024-06-07 | End: 2024-06-07

## 2024-06-07 RX ORDER — BUPIVACAINE HYDROCHLORIDE 5 MG/ML
INJECTION, SOLUTION EPIDURAL; INTRACAUDAL
Status: DISCONTINUED | OUTPATIENT
Start: 2024-06-07 | End: 2024-06-07 | Stop reason: HOSPADM

## 2024-06-07 RX ORDER — PROPOFOL 10 MG/ML
INJECTION, EMULSION INTRAVENOUS
Status: DISCONTINUED | OUTPATIENT
Start: 2024-06-07 | End: 2024-06-07

## 2024-06-07 RX ORDER — KETOROLAC TROMETHAMINE 30 MG/ML
30 INJECTION, SOLUTION INTRAMUSCULAR; INTRAVENOUS EVERY 6 HOURS
Status: DISPENSED | OUTPATIENT
Start: 2024-06-07 | End: 2024-06-08

## 2024-06-07 RX ORDER — LANOLIN ALCOHOL/MO/W.PET/CERES
400 CREAM (GRAM) TOPICAL 2 TIMES DAILY
Status: COMPLETED | OUTPATIENT
Start: 2024-06-07 | End: 2024-06-09

## 2024-06-07 RX ORDER — PROPOFOL 10 MG/ML
VIAL (ML) INTRAVENOUS CONTINUOUS PRN
Status: DISCONTINUED | OUTPATIENT
Start: 2024-06-07 | End: 2024-06-07

## 2024-06-07 RX ORDER — ISOSORBIDE MONONITRATE 30 MG/1
30 TABLET, EXTENDED RELEASE ORAL DAILY
Status: DISCONTINUED | OUTPATIENT
Start: 2024-06-07 | End: 2024-06-10 | Stop reason: HOSPADM

## 2024-06-07 RX ORDER — LIDOCAINE HYDROCHLORIDE 20 MG/ML
INJECTION INTRAVENOUS
Status: DISCONTINUED | OUTPATIENT
Start: 2024-06-07 | End: 2024-06-07

## 2024-06-07 RX ORDER — CYCLOBENZAPRINE HCL 10 MG
10 TABLET ORAL 3 TIMES DAILY PRN
Status: DISCONTINUED | OUTPATIENT
Start: 2024-06-07 | End: 2024-06-07

## 2024-06-07 RX ORDER — RANOLAZINE 500 MG/1
500 TABLET, EXTENDED RELEASE ORAL 2 TIMES DAILY
Status: DISCONTINUED | OUTPATIENT
Start: 2024-06-07 | End: 2024-06-10 | Stop reason: HOSPADM

## 2024-06-07 RX ORDER — ATROPINE SULFATE 0.1 MG/ML
INJECTION INTRAVENOUS
Status: DISPENSED
Start: 2024-06-07 | End: 2024-06-08

## 2024-06-07 RX ADMIN — OXYCODONE HYDROCHLORIDE AND ACETAMINOPHEN 1 TABLET: 5; 325 TABLET ORAL at 03:06

## 2024-06-07 RX ADMIN — Medication 100 MCG: at 01:06

## 2024-06-07 RX ADMIN — Medication 400 MG: at 08:06

## 2024-06-07 RX ADMIN — VANCOMYCIN HYDROCHLORIDE 1000 MG: 1 INJECTION, POWDER, LYOPHILIZED, FOR SOLUTION INTRAVENOUS at 01:06

## 2024-06-07 RX ADMIN — KETOROLAC TROMETHAMINE 30 MG: 30 INJECTION, SOLUTION INTRAMUSCULAR; INTRAVENOUS at 06:06

## 2024-06-07 RX ADMIN — ACETAMINOPHEN 650 MG: 325 TABLET, FILM COATED ORAL at 02:06

## 2024-06-07 RX ADMIN — CLOPIDOGREL BISULFATE 75 MG: 75 TABLET ORAL at 08:06

## 2024-06-07 RX ADMIN — CYCLOBENZAPRINE 10 MG: 10 TABLET, FILM COATED ORAL at 08:06

## 2024-06-07 RX ADMIN — GABAPENTIN 100 MG: 100 CAPSULE ORAL at 08:06

## 2024-06-07 RX ADMIN — POTASSIUM CHLORIDE 40 MEQ: 1500 TABLET, EXTENDED RELEASE ORAL at 05:06

## 2024-06-07 RX ADMIN — GLYCOPYRROLATE 0.2 MG: 0.2 INJECTION INTRAMUSCULAR; INTRAVENOUS at 01:06

## 2024-06-07 RX ADMIN — ASPIRIN 81 MG: 81 TABLET, COATED ORAL at 08:06

## 2024-06-07 RX ADMIN — CYCLOBENZAPRINE 10 MG: 10 TABLET, FILM COATED ORAL at 03:06

## 2024-06-07 RX ADMIN — PROPOFOL 50 MG: 10 INJECTION, EMULSION INTRAVENOUS at 01:06

## 2024-06-07 RX ADMIN — RANOLAZINE 500 MG: 500 TABLET, EXTENDED RELEASE ORAL at 02:06

## 2024-06-07 RX ADMIN — CARVEDILOL 6.25 MG: 3.12 TABLET, FILM COATED ORAL at 08:06

## 2024-06-07 RX ADMIN — RANOLAZINE 500 MG: 500 TABLET, EXTENDED RELEASE ORAL at 08:06

## 2024-06-07 RX ADMIN — PROPOFOL 100 MCG/KG/MIN: 10 INJECTION, EMULSION INTRAVENOUS at 01:06

## 2024-06-07 RX ADMIN — PANTOPRAZOLE SODIUM 40 MG: 40 TABLET, DELAYED RELEASE ORAL at 08:06

## 2024-06-07 RX ADMIN — ISOSORBIDE MONONITRATE 30 MG: 30 TABLET, EXTENDED RELEASE ORAL at 02:06

## 2024-06-07 RX ADMIN — SODIUM CHLORIDE, SODIUM GLUCONATE, SODIUM ACETATE, POTASSIUM CHLORIDE AND MAGNESIUM CHLORIDE: 526; 502; 368; 37; 30 INJECTION, SOLUTION INTRAVENOUS at 01:06

## 2024-06-07 RX ADMIN — KETAMINE HYDROCHLORIDE 30 MG: 100 INJECTION, SOLUTION, CONCENTRATE INTRAMUSCULAR; INTRAVENOUS at 01:06

## 2024-06-07 RX ADMIN — LIDOCAINE HYDROCHLORIDE 50 MG: 20 INJECTION INTRAVENOUS at 01:06

## 2024-06-07 NOTE — TRANSFER OF CARE
"Anesthesia Transfer of Care Note    Patient: Carlos ALLEN Keene    Procedure(s) Performed: Procedure(s) (LRB):  Impella, Removal (N/A)    Patient location: ICU    Anesthesia Type: MAC    Transport from OR: Transported from OR on 2-3 L/min O2 by NC with adequate spontaneous ventilation. Continuous ECG monitoring in transport. Continuous SpO2 monitoring in transport. Continuos invasive BP monitoring in transport    Post pain: adequate analgesia    Post assessment: no apparent anesthetic complications    Post vital signs: stable    Level of consciousness: awake    Nausea/Vomiting: no nausea/vomiting    Complications: none    Transfer of care protocol was followed      Last vitals: Visit Vitals  /62 (BP Location: Right arm, Patient Position: Lying)   Pulse 80   Temp 36.8 °C (98.3 °F) (Oral)   Resp 19   Ht 5' 10" (1.778 m)   Wt 121.5 kg (267 lb 13.7 oz)   SpO2 96%   BMI 38.43 kg/m²     "

## 2024-06-07 NOTE — OP NOTE
[facility] - [facility] ICU  Cardiothoracic Surgery  Operative Note    SUMMARY     Date of Procedure: 6/7/2024     Procedure:  1.  Removal of Impella left ventricular assist device and staple closure of axillary artery conduit     Surgeon: HANG Zuleta     Assistant: Gordy Brennan    Pre-Operative Diagnosis:  Status post Impella left ventricular assist device placement for high-risk PCI    Post-Operative Diagnosis:  Vein    Anesthesia: General    Operative Findings (including complications, if any):  Dictated    Description of Technical Procedures:  Patient was delivered to the operating room, and monitored anesthesia care was performed.  The previously made left deltopectoral groove incision was made opened and irrigated with copious amounts Pulsavac irrigation.  The Impella pump was turned off and pulled back into the conduit and then removed.  Care was taken to occlude the distal axillary artery during removal.  Following removal of the pump the conduit was then stapled with a TeachScape vascular stapling device.  Pulsavac irrigation was then again performed in the wound was closed in layers with Vicryl.  The patient tolerated the procedure well and will be delivered to the recovery room in stable condition.    Estimated Blood Loss (EBL):  20 mL          Specimens:   Specimen (24h ago, onward)      None                    Condition: Stable    Disposition: PACU - hemodynamically stable.

## 2024-06-07 NOTE — PLAN OF CARE
Problem: Adult Inpatient Plan of Care  Goal: Plan of Care Review  Outcome: Progressing  Goal: Patient-Specific Goal (Individualized)  Outcome: Progressing  Goal: Absence of Hospital-Acquired Illness or Injury  Outcome: Progressing  Goal: Optimal Comfort and Wellbeing  Outcome: Progressing  Goal: Readiness for Transition of Care  Outcome: Progressing     Problem: Infection  Goal: Absence of Infection Signs and Symptoms  Outcome: Progressing     Problem: Wound  Goal: Optimal Coping  Outcome: Progressing  Goal: Optimal Functional Ability  Outcome: Progressing  Goal: Absence of Infection Signs and Symptoms  Outcome: Progressing  Goal: Improved Oral Intake  Outcome: Progressing  Goal: Optimal Pain Control and Function  Outcome: Progressing  Goal: Skin Health and Integrity  Outcome: Progressing  Goal: Optimal Wound Healing  Outcome: Progressing     Problem: Skin Injury Risk Increased  Goal: Skin Health and Integrity  Outcome: Progressing

## 2024-06-07 NOTE — NURSING
Nurses Note -- 4 Eyes      6/7/2024   4:46 AM      Skin assessed during: daily assessment      [x] No Altered Skin Integrity Present    [x]Prevention Measures Documented      [] Yes- Altered Skin Integrity Present or Discovered   [] LDA Added if Not in Epic (Describe Wound)   [] New Altered Skin Integrity was Present on Admit and Documented in LDA   [] Wound Image Taken    Wound Care Consulted? No    Attending Nurse:  Tyra Braden RN    Second RN/Staff Member:   Nat Mccoy RN

## 2024-06-07 NOTE — PROGRESS NOTES
[facility] - [facility] ICU  Pulmonary Critical Care Note      Patient Name: Carlos Waller  MRN: [id number]  Admission Date: 6/5/2024  Hospital Length of Stay: 2 days  Code Status: Full Code  Attending Provider: Brant Flores MD  Primary Care Provider: Louis Monahan Jr., MD     Subjective:     HPI: Carlos Waller is a 68-year-old male with past medical history of CAD status post CABG x3 in 1999, hypertension, hyperlipidemia, arthritis, obesity was admitted to ICU after Impella placement for high-risk PCI today on  06/05/2024. Patient was extubated the PACU and is on 10 L OxyMask satting well at this time.  On my examination, patient is alert and responding appropriately.  Bleeding noted on the left axillary Impella site.     Hospital Course/Significant events:    Impella placement on 06/05/2024  24 Hour Interval History:   Patient underwent stent placement with a left heart catheterization yesterday and tolerated well.  VSS. Afebrile.Impella at P4.   Removal of Impella today with CT surgery    Past Medical History:   Diagnosis Date    Anemia, unspecified     Arthritis     Chest pain     Coronary artery disease     Coronary artery disease involving coronary bypass graft of native heart, unspecified whether angina present     Hyperlipidemia     Hypertension     Obesity     Right shoulder pain     Sleep apnea     Stroke     left side numbness       Past Surgical History:   Procedure Laterality Date    CARDIAC CATHETERIZATION  2016    COLONOSCOPY W/ POLYPECTOMY      CORONARY ANGIOGRAPHY INCLUDING BYPASS GRAFTS WITH CATHETERIZATION OF LEFT HEART Left 07/29/2022    Procedure: ANGIOGRAM, CORONARY, INCLUDING BYPASS GRAFT, WITH LEFT HEART CATHETERIZATION;  Surgeon: Constantin Martino MD;  Location: [facility] CATH LAB;  Service: Cardiology;  Laterality: Left;    CORONARY ARTERY BYPASS GRAFT  1999    CORONARY BYPASS GRAFT ANGIOGRAPHY  04/19/2024    Procedure: Bypass graft study;  Surgeon: Constantin Martino MD;   Location: [facility] CATH LAB;  Service: Cardiology;;    CORONARY STENT PLACEMENT      x2    HERNIA REPAIR      failed    LEFT HEART CATHETERIZATION Left 04/19/2024    Procedure: Left heart cath;  Surgeon: Constantin Martino MD;  Location: [facility] CATH LAB;  Service: Cardiology;  Laterality: Left;  LHC +/- PCI VIA RT FEMORAL ARTERY    PERCUTANEOUS TRANSLUMINAL BALLOON ANGIOPLASTY OF BYPASS GRAFT N/A 07/29/2022    Procedure: PTA, BYPASS GRAFT, USING BALLOON;  Surgeon: Constantin Martino MD;  Location: [facility] CATH LAB;  Service: Cardiology;  Laterality: N/A;       Review of patient's allergies indicates:   Allergen Reactions    Codeine Itching       Current Outpatient Medications   Medication Instructions    aspirin (ECOTRIN) 81 mg, Oral, Daily    carvediloL (COREG) 6.25 mg, Oral, 2 times daily    clopidogreL (PLAVIX) 75 mg, Oral, Daily    cyclobenzaprine (FLEXERIL) 10 mg, Oral, 3 times daily PRN    gabapentin (NEURONTIN) 100 mg, Oral, 2 times daily    isosorbide mononitrate (IMDUR) 30 mg, Oral, Daily    levocetirizine (XYZAL) 5 mg, Oral, Daily PRN    lisinopriL (PRINIVIL,ZESTRIL) 20 mg, Oral, Daily    meloxicam (MOBIC) 7.5 mg, Oral, Daily PRN    nitroGLYCERIN (NITROSTAT) 0.4 mg, Sublingual, Every 5 min PRN    omeprazole (PRILOSEC) 40 mg, Oral, Daily    ranolazine (RANEXA) 500 mg, Oral, 2 times daily    rosuvastatin (CRESTOR) 20 mg, Oral, Daily        Social History:     Social History     Socioeconomic History    Marital status: Single   Tobacco Use    Smoking status: Never    Smokeless tobacco: Never   Substance and Sexual Activity    Alcohol use: Yes     Alcohol/week: 10.0 standard drinks of alcohol     Types: 10 Cans of beer per week    Drug use: Not Currently     Social Determinants of Health     Financial Resource Strain: Medium Risk (6/6/2024)    Overall Financial Resource Strain (CARDIA)     Difficulty of Paying Living Expenses: Somewhat hard   Food Insecurity: No Food Insecurity (6/6/2024)    Hunger Vital Sign     Worried  About Running Out of Food in the Last Year: Never true     Ran Out of Food in the Last Year: Never true   Transportation Needs: No Transportation Needs (6/6/2024)    TRANSPORTATION NEEDS     Transportation : No   Physical Activity: Unknown (6/6/2024)    Exercise Vital Sign     Days of Exercise per Week: 0 days   Stress: Stress Concern Present (6/6/2024)    [facility] - Occupational Stress Questionnaire     Feeling of Stress : Rather much   Housing Stability: Low Risk  (6/6/2024)    Housing Stability Vital Sign     Unable to Pay for Housing in the Last Year: No     Homeless in the Last Year: No       Family History   Problem Relation Name Age of Onset    Stroke Mother      Cancer Father         ROS ROS completed and negative except as indicated above.   Objective:     Vital Signs (Most Recent):  Temp: 98.3 °F (36.8 °C) (06/07/24 0800)  Pulse: 85 (06/07/24 0800)  Resp: (!) 21 (06/07/24 0800)  BP: 124/66 (06/07/24 0800)  SpO2: 100 % (06/07/24 0845)  Body mass index is 38.43 kg/m².  Weight: 121.5 kg (267 lb 13.7 oz) Vital Signs (24h Range):  Temp:  [97.7 °F (36.5 °C)-98.3 °F (36.8 °C)] 98.3 °F (36.8 °C)  Pulse:  [75-97] 85  Resp:  [3-27] 21  SpO2:  [93 %-100 %] 100 %  BP: (111-145)/(62-89) 124/66  Arterial Line BP: (112-171)/(52-78) 145/59     Input/output::     Intake/Output Summary (Last 24 hours) at 6/7/2024 0928  Last data filed at 6/7/2024 0800  Gross per 24 hour   Intake 915.85 ml   Output 330 ml   Net 585.85 ml       Physical Exam:   GEN:  Alert, oriented x3   HEENT:  Insertion of Impella to the left axillary, bleeding noted at the site, improved  CARDIO: regular rate, regular rhythm, normal S1, S2, no murmurs, rubs, clicks or gallops   PULM/CHEST: clear to auscultation bilaterally, no wheezes, rales or rhonchi, symmetric air entry, no accessory muscle use or distress  ABDOMEN: + BS, soft, non tender, non-distended, no guarding and no rebound. no masses or organomegaly   DERM: No  rashes or lesions   EXT: peripheral pulses normal, no clubbing or cyanosis. No BLE edema.   NEURO: AAOx3, responding appropriately          Significant Labs:    Laboratory Studies:   Recent Labs   Lab 06/07/24  0040   PH 7.370  7.430   PCO2 42.0  36.0   PO2 <38.0  99.0   HCO3 24.3  23.9     Recent Labs   Lab 06/07/24  0236   WBC 10.19   RBC 3.31*   HGB 10.7*   HCT 31.9*      MCV 96.4*   MCH 32.3*   MCHC 33.5     Recent Labs   Lab 06/07/24  0237   GLUCOSE 102      K 3.7   CO2 21*   BUN 11.7   CREATININE 0.68*   CALCIUM 7.8*   MG 1.90           ABGs:    Recent Labs   Lab 06/07/24  0040   PH 7.370  7.430   PO2 <38.0  99.0   PCO2 42.0  36.0   HCO3 24.3  23.9   POCBASEDEF -1.00  -0.10       Lines/Drains/Airways       Drain  Duration                  Urethral Catheter 06/05/24 0728 Temperature probe 16 Fr. <1 day              Arterial Line  Duration                  Sheath 06/05/24 1006 Right anterior;proximal <1 day    Arterial Line 06/05/24 0649 Right Radial <1 day                     Vent:       Current Medications:     Infusions:   heparin (porcine) in 5 % dex  3 Units/kg/hr (Adjusted) Intravenous Continuous        sodium bicarbonate 25 mEq in dextrose 5 % (D5W) 1,000 mL infusion   Intravenous Continuous             Scheduled:   aspirin  81 mg Oral Daily    carvediloL  6.25 mg Oral BID    clopidogreL  75 mg Oral Daily    gabapentin  100 mg Oral BID    magnesium oxide  400 mg Oral BID    pantoprazole  40 mg Oral Daily         PRN:   aspirin EC tablet 81 mg    carvediloL tablet 6.25 mg    clopidogreL tablet 75 mg    gabapentin capsule 100 mg    magnesium oxide tablet 400 mg    pantoprazole EC tablet 40 mg        Microbiology Data:  Microbiology Results (last 7 days)       Procedure Component Value Units Date/Time    MRSA Screen by PCR [[id number]] Collected: 06/07/24 0522    Order Status: Canceled Specimen: Nasal Swab Updated: 06/07/24 0526             Antibiotics and Day Number of  "Therapy:  Antibiotics (From admission, onward)      Start     Stop Route Frequency Ordered    06/06/24 2151  mupirocin 2 % ointment         -- Nasl On Call Procedure 06/06/24 2151 06/06/24 2151  cefazolin (ANCEF) 2 gram in dextrose 5% 50 mL IVPB (premix)         -- IV On Call Procedure 06/06/24 2151             Estimated Creatinine Clearance: 134 mL/min (A) (based on SCr of 0.68 mg/dL (L)).    Imaging:  X-Ray Chest AP Portable  Narrative: EXAMINATION:  XR CHEST AP PORTABLE    CLINICAL HISTORY:  Impella;    COMPARISON:  Chest x-ray dated 05/28/2024    FINDINGS:  A ventricular assist device is in place.  The heart is stable in size.  There is no focal airspace consolidation.  There is no pleural effusion or visible pneumothorax.  Impression: No acute abnormality of the chest.    Electronically signed by: Yuly Bhatt  Date:    06/06/2024  Time:    06:09        2D ECHO Results    Results for orders placed during the hospital encounter of 04/19/24    Echo    Interpretation Summary    Left Ventricle: Regional wall motion abnormalities present. The apical lateral, anterior and inferior walls are all hypokinetic. Basal and mid walls all augment WNL. There is moderately reduced systolic function with a visually estimated ejection fraction of 35 - 40%.    Tricuspid Valve: There is mild regurgitation.      Pulmonary Functions Testing Results:    No results found for: "FEV1", "FVC", "PYR0GSQ", "TLC", "DLCO"      Assessment/Plan:     Assessment:  Severe triple-vessel CAD and non-amenable to surgical revascularization, Impella placement and multivessel PCI  History of CABG x3 in 1999  Impella site bleeding  Acute on chronic systolic and diastolic heart failure with EF of 30%   Obesity  Hypertension  Hyperlipidemia  Sleep apnea  Hx of CVA        Plan:  Admit to ICU for close monitoring   Continue aspirin Plavix   Patient underwent left heart catheterization yesterday and underwent PCI of LM into LAD   Impella management by " CT surgery, planning for removal today  Replete electrolytes as necessary      DVT Prophylaxis: heparin  GI prophylaxis: None   Keep HOB elevated > 30°  Maintain blood glucose levels 140-180    32 minutes of critical care was time spent personally by me on the following activities: development of treatment plan with patient or surrogate and bedside caregivers, discussions with consultants, evaluation of patient's response to treatment, examination of patient, ordering and performing treatments and interventions, ordering and review of laboratory studies, ordering and review of radiographic studies, pulse oximetry, re-evaluation of patient's condition.  This critical care time did not overlap with that of any other provider or involve time for any procedures.     Inés Corona, DO  Pulmonary/Critical Care  [facility] - [facility] ICU

## 2024-06-07 NOTE — NURSING
Nurses Note -- 4 Eyes      6/7/2024   7:06 AM      Skin assessed during: Daily Assessment      [x] No Altered Skin Integrity Present    [x]Prevention Measures Documented      [] Yes- Altered Skin Integrity Present or Discovered   [] LDA Added if Not in Epic (Describe Wound)   [] New Altered Skin Integrity was Present on Admit and Documented in LDA   [] Wound Image Taken    Wound Care Consulted? No    Attending Nurse:  SHARLA Shearer    Second RN/Staff Member:  SHARLA Abraham

## 2024-06-07 NOTE — PROGRESS NOTES
[facility] - [facility]    Cardiology  Progress Note    Patient Name: Carlos Waller  MRN: [id number]  Admission Date: 6/5/2024  Hospital Length of Stay: 2 days  Code Status: Full Code   Attending Physician: Brant Flores MD   Primary Care Physician: Louis Monahan Jr., MD  Expected Discharge Date:   Principal Problem:<principal problem not specified>    Subjective:   Brief HPI/Hospital Course:   Mr. Waller is a 68 year old male, known to Dr. Martino, who underwent elective outpatient MV PCI with Surgical Impella assist by Dr. Flores on 6.5.24. He underwent PCI of Radial Graft Supplying Native OM, Laser Atherectomy of LIMA to LAD with balloon angioplasty for ISR, and balloon angioplasty of distal Native LAD. Noted was failed attempt at PCI of Native RCA , however, patient did have evidence of left to right collaterals. Patient did lose some blood during the procedure, however, post procedure his H/H remained stable. Imeplla was kept in place, hemodynamics stable with no recurrent angina post intervention. Patient admitted to ICU for close monitoring and Impella management.    Hospital Course:  6.6.24: NAD Noted. SR in the 70's. BP Stable. Impella Left Axillary stable with some oozing of blood at insertion site (slow). Area is bandaged and being closely monitored. Stable at current time. P6 Support. PAP 30.6 (15), CVP 6, Adequate UO noted. Denies CP/SOB. Does report lower back pain related to lying flat, had some left forearm pain yesterday and throughout the night which has improved. Right Groin [place]ville site and bilateral groins soft flat with no evidence of bleed or hematoma noted. He is NPO for LM Intervention with Dr. Nettles today.   6.7.24: NAD Noted. Underwent LHC/PCI Yesterday with Dr. Nettles and tolerated well. No CP/SOB. BP Stable. /60, PAP 37/15 (23), CVP 10.     PMH: CAD/CABG, Abnormal Stress Test, Obesity, Anemia, ICMO, Severe HERMINIA, Hypertension, Hyperlipidemia, CVA  PSH:  LHC, CABG, Hernia Repair, Colonoscopy  Family History: Father- Pancreatic Cancer, Mother- CVA  Social History: Tobacco- Negative, Alcohol- Negative, Substance Abuse- Negative    Previous Diagnostics:  Coronary Angiogram (6.6.24):  PCI of LM into LAD  Left Axillary Impella Assist  Surgeon: Dr. Tho Barnes Echocardiogram (6.5.24):  Limited study to assess impella placement. Impella measures approximately 4.1cm from aortic valve into the left ventricle. EF visually appears 30%.    C/RHC MV PCI (6.5.24):  Preprocedure diagnosis-abnormal stress test, surgical turndown, Class 3 angina, chronic systolic heart failure  Postprocedure diagnosis-Obstructive CAD  Estimated blood loss-40 cc  Tissue removal-none  Complications-none  Procedures performed:  Ultrasound-guided bilateral groin access  Coronary, SVG, LIMA angiography  Failed attempt at retrograde  PCI of RCA (unable to cannulate RCA with guide while 5.5 impella was in place)  Shockwave lithotripsy of LM and proximal LAD with 3.5 balloon  IVUS of LM, Radial graft to OM  PCI of ostial radial graft with 4x16 synergy KALYANI  Laser atherectomy of LIMA to LAD, PTCA for ISR with 2.5 NC balloon at 20 yesenia.  PTCA native distal LAD with 2.0 balloon.  Intracoronary nitro and adenosine administration to distal LAD  Perclose Right CFA, angioseal Left CFA  RHC  Findings:  Left main-80% calcified stenosis reduced to 60%   LAD-MID   LCX-  RCA-, Left to right collaterals  Radial graft to OM 90% reduced to 0% post PCI  LIMA to LAD-In stent  reduced to 10% post laser, PTA.  LAD is a bifid system distally  Pa sat pre pci 75, post 70  PAP 33/11 pre, 33/16 post    Impella Placement (Dr. Zuleta) (6.5.24):  5.5 Liter Placement with 10 mm Dacron Conduit via left axillary approach.    Echocardiogram (4.19.24):  Left Ventricle: Regional wall motion abnormalities present. The apical lateral, anterior and inferior walls are all hypokinetic. Basal and mid walls all augment WNL.  There is moderately reduced systolic function with a visually estimated ejection fraction of 35 - 40%.  Tricuspid Valve: There is mild regurgitation.    Echocardiogram (11.9.23):  The study quality is below average.   The left ventricle is normal in size. Global left ventricular systolic function is normal. The left ventricular ejection fraction is 55%. Left ventricular diastolic function is normal. Noted left ventricular hypertrophy. Concentric left ventricular hypertrophy is present. It is mild.   Optison, ultrasound image enhancement, was utilized on this study per standing order for better visualization of left ventricular endocardium.  The patients IV was started in the right arm by Luisana Dent RN. 2ml imaging enhancement during image acquisition, 1ml was wasted. The IV was removed upon completion of the study.   The right atrium is moderately enlarged. The left atrial diameter is mildly increased.  Mild (1+) mitral regurgitation. Mild (1+) tricuspid regurgitation. Mild (1+) pulmonic regurgitation.   The pulmonary artery systolic pressure is 12 mmHg.     CABG (7.16.99):  LIMA to LAD, Left Radial Artery to OM Branch, SVG to Diagonal Branch  Surgeon: Dr. Washington    Review of Systems   Cardiovascular:  Negative for chest pain.   Respiratory:  Negative for shortness of breath.    All other systems reviewed and are negative.    Objective:     Vital Signs (Most Recent):  Temp: 98.3 °F (36.8 °C) (06/07/24 0800)  Pulse: 85 (06/07/24 0800)  Resp: (!) 21 (06/07/24 0800)  BP: 124/66 (06/07/24 0800)  SpO2: 100 % (06/07/24 0845) Vital Signs (24h Range):  Temp:  [97.7 °F (36.5 °C)-98.3 °F (36.8 °C)] 98.3 °F (36.8 °C)  Pulse:  [72-97] 85  Resp:  [3-27] 21  SpO2:  [93 %-100 %] 100 %  BP: (111-145)/(62-89) 124/66  Arterial Line BP: (112-171)/(52-78) 145/59   Weight: 121.5 kg (267 lb 13.7 oz)  Body mass index is 38.43 kg/m².  SpO2: 100 %       Intake/Output Summary (Last 24 hours) at 6/7/2024 0847  Last data filed at 6/7/2024  0800  Gross per 24 hour   Intake 915.85 ml   Output 375 ml   Net 540.85 ml     Lines/Drains/Airways       Drain  Duration                  Urethral Catheter 06/05/24 0728 Temperature probe 16 Fr. 2 days              Arterial Line  Duration             Arterial Line 06/05/24 0649 Right Radial 2 days              Line  Duration                  VAD 06/05/24 1000 Left ventricular assist device Impella 1 day              Peripheral Intravenous Line  Duration                  Peripheral IV - Single Lumen 06/05/24 0709 18 G Left Hand 2 days         Sheath 06/05/24 1006 Right anterior;proximal 1 day                  Significant Labs:   Recent Results (from the past 72 hour(s))   POCT glucose    Collection Time: 06/05/24  6:00 AM   Result Value Ref Range    POCT Glucose 91 70 - 110 mg/dL   ISTAT PROCEDURE    Collection Time: 06/05/24  1:33 PM   Result Value Ref Range    POC PH 7.391 7.35 - 7.45    POC PCO2 40.1 35 - 45 mmHg    POC PO2 149 (H) 80 - 100 mmHg    POC HCO3 24.3 24 - 28 mmol/L    POC BE -1 -2 to 2 mmol/L    POC SATURATED O2 99 95 - 100 %    POC Glucose 127 (H) 70 - 110 mg/dL    POC Sodium 136 136 - 145 mmol/L    POC Potassium 3.9 3.5 - 5.1 mmol/L    POC TCO2 26 23 - 27 mmol/L    POC Ionized Calcium 1.30 1.06 - 1.42 mmol/L    POC Hematocrit 39 36 - 54 %PCV    POC HEMOGLOBIN 13 g/dL    Sample ARTERIAL    Type & Screen    Collection Time: 06/05/24  4:19 PM   Result Value Ref Range    Group & Rh O NEG     Indirect Julián GEL NEG     Specimen Outdate 06/08/2024 23:59    Comprehensive Metabolic Panel    Collection Time: 06/05/24  4:19 PM   Result Value Ref Range    Sodium 137 136 - 145 mmol/L    Potassium 3.9 3.5 - 5.1 mmol/L    Chloride 106 98 - 107 mmol/L    CO2 21 (L) 23 - 31 mmol/L    Glucose 164 (H) 82 - 115 mg/dL    Blood Urea Nitrogen 17.6 8.4 - 25.7 mg/dL    Creatinine 0.80 0.73 - 1.18 mg/dL    Calcium 8.5 (L) 8.8 - 10.0 mg/dL    Protein Total 5.8 5.8 - 7.6 gm/dL    Albumin 3.5 3.4 - 4.8 g/dL    Globulin 2.3  (L) 2.4 - 3.5 gm/dL    Albumin/Globulin Ratio 1.5 1.1 - 2.0 ratio    Bilirubin Total 2.2 (H) <=1.5 mg/dL    ALP 52 40 - 150 unit/L    ALT 17 0 - 55 unit/L    AST 26 5 - 34 unit/L    eGFR >60 mL/min/1.73/m2    Anion Gap 10.0 mEq/L    BUN/Creatinine Ratio 22    CBC with Differential    Collection Time: 06/05/24  4:19 PM   Result Value Ref Range    WBC 14.73 (H) 4.50 - 11.50 x10(3)/mcL    RBC 4.29 (L) 4.70 - 6.10 x10(6)/mcL    Hgb 13.7 (L) 14.0 - 18.0 g/dL    Hct 40.8 (L) 42.0 - 52.0 %    MCV 95.1 (H) 80.0 - 94.0 fL    MCH 31.9 (H) 27.0 - 31.0 pg    MCHC 33.6 33.0 - 36.0 g/dL    RDW 12.6 11.5 - 17.0 %    Platelet 264 130 - 400 x10(3)/mcL    MPV 10.6 (H) 7.4 - 10.4 fL    Neut % 77.5 %    Lymph % 15.0 %    Mono % 6.5 %    Eos % 0.2 %    Basophil % 0.3 %    Lymph # 2.21 0.6 - 4.6 x10(3)/mcL    Neut # 11.42 (H) 2.1 - 9.2 x10(3)/mcL    Mono # 0.96 0.1 - 1.3 x10(3)/mcL    Eos # 0.03 0 - 0.9 x10(3)/mcL    Baso # 0.04 <=0.2 x10(3)/mcL    IG# 0.07 (H) 0 - 0.04 x10(3)/mcL    IG% 0.5 %    NRBC% 0.0 %   Prepare RBC 4 Units; on call to or    Collection Time: 06/05/24  4:19 PM   Result Value Ref Range    UNIT NUMBER [id number]     UNIT ABO/RH O NEG     DISPENSE STATUS Selected     Unit Expiration [id number]     Product Code G5076J95     Unit Blood Type Code 9500     CROSSMATCH INTERPRETATION Compatible     UNIT NUMBER [id number]     UNIT ABO/RH O NEG     DISPENSE STATUS Selected     Unit Expiration [id number]     Product Code F3508F63     Unit Blood Type Code 9500     CROSSMATCH INTERPRETATION Compatible     UNIT NUMBER [id number]     UNIT ABO/RH O NEG     DISPENSE STATUS Selected     Unit Expiration [id number]     Product Code U6898I44     Unit Blood Type Code 9500     CROSSMATCH INTERPRETATION Compatible     UNIT NUMBER [id number]     UNIT ABO/RH O NEG     DISPENSE STATUS Selected     Unit Expiration [id number]     Product Code E6316O64     Unit Blood Type Code 9500     CROSSMATCH INTERPRETATION Compatible     CBC with Differential    Collection Time: 06/05/24  5:17 PM   Result Value Ref Range    WBC 15.04 (H) 4.50 - 11.50 x10(3)/mcL    RBC 4.35 (L) 4.70 - 6.10 x10(6)/mcL    Hgb 13.9 (L) 14.0 - 18.0 g/dL    Hct 40.8 (L) 42.0 - 52.0 %    MCV 93.8 80.0 - 94.0 fL    MCH 32.0 (H) 27.0 - 31.0 pg    MCHC 34.1 33.0 - 36.0 g/dL    RDW 12.6 11.5 - 17.0 %    Platelet 276 130 - 400 x10(3)/mcL    MPV 10.2 7.4 - 10.4 fL    Neut % 81.5 %    Lymph % 11.8 %    Mono % 6.1 %    Eos % 0.1 %    Basophil % 0.2 %    Lymph # 1.77 0.6 - 4.6 x10(3)/mcL    Neut # 12.26 (H) 2.1 - 9.2 x10(3)/mcL    Mono # 0.92 0.1 - 1.3 x10(3)/mcL    Eos # 0.02 0 - 0.9 x10(3)/mcL    Baso # 0.03 <=0.2 x10(3)/mcL    IG# 0.04 0 - 0.04 x10(3)/mcL    IG% 0.3 %    NRBC% 0.0 %   Protime-INR    Collection Time: 06/05/24  5:17 PM   Result Value Ref Range    PT 14.5 12.5 - 14.5 seconds    INR 1.2 <=1.3   CK    Collection Time: 06/05/24  5:17 PM   Result Value Ref Range    Creatine Kinase 240 (H) 30 - 200 U/L   CK-MB    Collection Time: 06/05/24  5:17 PM   Result Value Ref Range    Creatine Kinase MB 3.6 <=7.2 ng/mL   APTT    Collection Time: 06/05/24  5:17 PM   Result Value Ref Range    PTT 93.8 (H) 23.2 - 33.7 seconds   Basic Metabolic Panel    Collection Time: 06/05/24  5:17 PM   Result Value Ref Range    Sodium 137 136 - 145 mmol/L    Potassium 4.0 3.5 - 5.1 mmol/L    Chloride 106 98 - 107 mmol/L    CO2 18 (L) 23 - 31 mmol/L    Glucose 150 (H) 82 - 115 mg/dL    Blood Urea Nitrogen 17.1 8.4 - 25.7 mg/dL    Creatinine 0.73 0.73 - 1.18 mg/dL    BUN/Creatinine Ratio 23     Calcium 8.6 (L) 8.8 - 10.0 mg/dL    Anion Gap 13.0 mEq/L    eGFR >60 mL/min/1.73/m2   EKG 12-LEAD on arrival to floor    Collection Time: 06/05/24  5:35 PM   Result Value Ref Range    QRS Duration 100 ms    [facility] QTC Calculation 468 ms   POCT glucose    Collection Time: 06/05/24 10:23 PM   Result Value Ref Range    POCT Glucose 133 (H) 70 - 110 mg/dL   RT Blood Gas    Collection Time: 06/05/24 11:18 PM    Result Value Ref Range    Sample Type Mixed Venous Blood     Sample site Arterial Line     Drawn by [facility]     pH, Blood gas 7.480     pCO2, Blood gas 32.0 mmHg    pO2, Blood gas 66.0 mmHg    Sodium, Blood Gas 132 mmol/L    Potassium, Blood Gas 3.8 mmol/L    Calcium Level Ionized 1.14 1.12 - 1.32 mmol/L    TOC2, Blood gas 24.8 mmol/L    Base Excess, Blood gas 1.00 mmol/L    sO2, Blood gas 94.2 %    HCO3, Blood gas 23.8 mmol/L    THb, Blood gas 14.4 g/dL    O2 Hb, Blood Gas 93.6 %    CO Hgb 1.7 %    Met Hgb 1.3 %    Allens Test N/A    RT Blood Gas    Collection Time: 06/05/24 11:19 PM   Result Value Ref Range    Sample Type Venous Blood     Drawn by  rt     pH, Blood gas 7.430 7.300 - 7.600    pCO2, Blood gas 37.0 20.0 - 50.0 mmHg    pO2, Blood gas <38.0 mmHg    Sodium, Blood Gas 134 (L) 137 - 145 mmol/L    Potassium, Blood Gas 3.9 3.5 - 5.0 mmol/L    Calcium Level Ionized 1.15 1.12 - 1.23 mmol/L    TOC2, Blood gas 25.7 mmol/L    Base Excess, Blood gas 0.50 mmol/L    sO2, Blood gas 52.7 %    HCO3, Blood gas 24.6 mmol/L    THb, Blood gas 14.4 g/dL    O2 Hb, Blood Gas 68.7 %    CO Hgb 2.1 %    Met Hgb 1.3 %   CBC with Differential    Collection Time: 06/06/24  2:03 AM   Result Value Ref Range    WBC 13.49 (H) 4.50 - 11.50 x10(3)/mcL    RBC 4.10 (L) 4.70 - 6.10 x10(6)/mcL    Hgb 13.2 (L) 14.0 - 18.0 g/dL    Hct 38.5 (L) 42.0 - 52.0 %    MCV 93.9 80.0 - 94.0 fL    MCH 32.2 (H) 27.0 - 31.0 pg    MCHC 34.3 33.0 - 36.0 g/dL    RDW 12.7 11.5 - 17.0 %    Platelet 224 130 - 400 x10(3)/mcL    MPV 10.1 7.4 - 10.4 fL    Neut % 82.1 %    Lymph % 8.4 %    Mono % 9.0 %    Eos % 0.0 %    Basophil % 0.2 %    Lymph # 1.13 0.6 - 4.6 x10(3)/mcL    Neut # 11.07 (H) 2.1 - 9.2 x10(3)/mcL    Mono # 1.22 0.1 - 1.3 x10(3)/mcL    Eos # 0.00 0 - 0.9 x10(3)/mcL    Baso # 0.03 <=0.2 x10(3)/mcL    IG# 0.04 0 - 0.04 x10(3)/mcL    IG% 0.3 %    NRBC% 0.0 %   Magnesium    Collection Time: 06/06/24  2:03 AM   Result Value Ref Range    Magnesium Level  1.70 1.60 - 2.60 mg/dL   Phosphorus    Collection Time: 06/06/24  2:03 AM   Result Value Ref Range    Phosphorus Level 3.6 2.3 - 4.7 mg/dL   Comprehensive Metabolic Panel    Collection Time: 06/06/24  2:03 AM   Result Value Ref Range    Sodium 135 (L) 136 - 145 mmol/L    Potassium 4.2 3.5 - 5.1 mmol/L    Chloride 106 98 - 107 mmol/L    CO2 20 (L) 23 - 31 mmol/L    Glucose 122 (H) 82 - 115 mg/dL    Blood Urea Nitrogen 15.4 8.4 - 25.7 mg/dL    Creatinine 0.72 (L) 0.73 - 1.18 mg/dL    Calcium 8.3 (L) 8.8 - 10.0 mg/dL    Protein Total 5.9 5.8 - 7.6 gm/dL    Albumin 3.4 3.4 - 4.8 g/dL    Globulin 2.5 2.4 - 3.5 gm/dL    Albumin/Globulin Ratio 1.4 1.1 - 2.0 ratio    Bilirubin Total 2.3 (H) <=1.5 mg/dL    ALP 49 40 - 150 unit/L    ALT 18 0 - 55 unit/L    AST 32 5 - 34 unit/L    eGFR >60 mL/min/1.73/m2    Anion Gap 9.0 mEq/L    BUN/Creatinine Ratio 21    POCT glucose    Collection Time: 06/06/24  8:18 AM   Result Value Ref Range    POCT Glucose 98 70 - 110 mg/dL   POCT glucose    Collection Time: 06/06/24 11:56 AM   Result Value Ref Range    POCT Glucose 90 70 - 110 mg/dL   POCT glucose    Collection Time: 06/06/24 12:09 PM   Result Value Ref Range    POC Glucose 90 70 - 110 MG/DL   POCT glucose    Collection Time: 06/06/24 12:10 PM   Result Value Ref Range    POC Glucose 98 70 - 110 MG/DL   RT Blood Gas    Collection Time: 06/06/24  5:08 PM   Result Value Ref Range    Sample Type Arterial Blood     Sample site Arterial Line     Drawn by ss lrt     pH, Blood gas 7.370 7.350 - 7.450    pCO2, Blood gas 38.0 35.0 - 45.0 mmHg    pO2, Blood gas 56.0 (L) 80.0 - 100.0 mmHg    Sodium, Blood Gas 134 (L) 137 - 145 mmol/L    Potassium, Blood Gas 3.6 3.5 - 5.0 mmol/L    Calcium Level Ionized 1.12 1.12 - 1.23 mmol/L    TOC2, Blood gas 23.2 mmol/L    Base Excess, Blood gas -3.00 (L) -2.00 - 2.00 mmol/L    sO2, Blood gas 87.8 %    HCO3, Blood gas 22.0 22.0 - 26.0 mmol/L    THb, Blood gas 11.6 (L) 12 - 16 g/dL    O2 Hb, Blood Gas 89.7 (L)  94.0 - 97.0 %    CO Hgb 2.4 (H) 0.5 - 1.5 %    Met Hgb 1.0 0.4 - 1.5 %    Allens Test N/A     FIO2, Blood gas 21 %   RT Blood Gas    Collection Time: 06/06/24  5:08 PM   Result Value Ref Range    Sample Type Venous Blood     pH, Blood gas 7.370 7.300 - 7.600    pCO2, Blood gas 44.0 20.0 - 50.0 mmHg    pO2, Blood gas <38.0 mmHg    Sodium, Blood Gas 136 (L) 137 - 145 mmol/L    Potassium, Blood Gas 3.6 3.5 - 5.0 mmol/L    Calcium Level Ionized 1.13 1.12 - 1.23 mmol/L    TOC2, Blood gas 26.8 mmol/L    Base Excess, Blood gas -0.10 mmol/L    sO2, Blood gas 52.7 %    HCO3, Blood gas 25.4 mmol/L    THb, Blood gas 11.4 g/dL    O2 Hb, Blood Gas 62.9 %    CO Hgb 2.8 %    Met Hgb 0.7 %   POCT glucose    Collection Time: 06/06/24  6:06 PM   Result Value Ref Range    POC Glucose 92 70 - 110 MG/DL   Type & Screen    Collection Time: 06/06/24 10:45 PM   Result Value Ref Range    Group & Rh O NEG     Indirect Julián GEL NEG     Specimen Outdate 06/09/2024 23:59    RT Blood Gas    Collection Time: 06/07/24 12:40 AM   Result Value Ref Range    Sample Type Mixed Venous Blood     Drawn by TDL RRT     pH, Blood gas 7.370     pCO2, Blood gas 42.0 mmHg    pO2, Blood gas <38.0 mmHg    Sodium, Blood Gas 136 mmol/L    Potassium, Blood Gas 3.5 mmol/L    Calcium Level Ionized 1.17 1.12 - 1.32 mmol/L    TOC2, Blood gas 25.6 mmol/L    Base Excess, Blood gas -1.00 mmol/L    sO2, Blood gas 59.3 %    HCO3, Blood gas 24.3 mmol/L    THb, Blood gas 11.3 g/dL    O2 Hb, Blood Gas 72.6 %    CO Hgb 2.5 %    Met Hgb 0.7 %    Oxygen Device, Blood gas Cannula     LPM 3    RT Blood Gas    Collection Time: 06/07/24 12:40 AM   Result Value Ref Range    Sample Type Arterial Blood     Sample site Arterial Line     Drawn by TDL RRT     pH, Blood gas 7.430 7.350 - 7.450    pCO2, Blood gas 36.0 35.0 - 45.0 mmHg    pO2, Blood gas 99.0 80.0 - 100.0 mmHg    Sodium, Blood Gas 133 (L) 137 - 145 mmol/L    Potassium, Blood Gas 3.5 3.5 - 5.0 mmol/L    Calcium Level Ionized  1.10 (L) 1.12 - 1.23 mmol/L    TOC2, Blood gas 25.0 mmol/L    Base Excess, Blood gas -0.10 -2.00 - 2.00 mmol/L    sO2, Blood gas 97.9 %    HCO3, Blood gas 23.9 22.0 - 26.0 mmol/L    THb, Blood gas 11.5 (L) 12 - 16 g/dL    O2 Hb, Blood Gas 95.4 94.0 - 97.0 %    CO Hgb 1.9 (H) 0.5 - 1.5 %    Met Hgb 1.1 0.4 - 1.5 %    Oxygen Device, Blood gas Cannula     LPM 3    CBC with Differential    Collection Time: 06/07/24  2:36 AM   Result Value Ref Range    WBC 10.19 4.50 - 11.50 x10(3)/mcL    RBC 3.31 (L) 4.70 - 6.10 x10(6)/mcL    Hgb 10.7 (L) 14.0 - 18.0 g/dL    Hct 31.9 (L) 42.0 - 52.0 %    MCV 96.4 (H) 80.0 - 94.0 fL    MCH 32.3 (H) 27.0 - 31.0 pg    MCHC 33.5 33.0 - 36.0 g/dL    RDW 12.8 11.5 - 17.0 %    Platelet 165 130 - 400 x10(3)/mcL    MPV 10.1 7.4 - 10.4 fL    Neut % 73.7 %    Lymph % 13.5 %    Mono % 11.9 %    Eos % 0.4 %    Basophil % 0.2 %    Lymph # 1.38 0.6 - 4.6 x10(3)/mcL    Neut # 7.51 2.1 - 9.2 x10(3)/mcL    Mono # 1.21 0.1 - 1.3 x10(3)/mcL    Eos # 0.04 0 - 0.9 x10(3)/mcL    Baso # 0.02 <=0.2 x10(3)/mcL    IG# 0.03 0 - 0.04 x10(3)/mcL    IG% 0.3 %    NRBC% 0.0 %   Comprehensive Metabolic Panel    Collection Time: 06/07/24  2:37 AM   Result Value Ref Range    Sodium 136 136 - 145 mmol/L    Potassium 3.7 3.5 - 5.1 mmol/L    Chloride 108 (H) 98 - 107 mmol/L    CO2 21 (L) 23 - 31 mmol/L    Glucose 102 82 - 115 mg/dL    Blood Urea Nitrogen 11.7 8.4 - 25.7 mg/dL    Creatinine 0.68 (L) 0.73 - 1.18 mg/dL    Calcium 7.8 (L) 8.8 - 10.0 mg/dL    Protein Total 5.3 (L) 5.8 - 7.6 gm/dL    Albumin 3.3 (L) 3.4 - 4.8 g/dL    Globulin 2.0 (L) 2.4 - 3.5 gm/dL    Albumin/Globulin Ratio 1.7 1.1 - 2.0 ratio    Bilirubin Total 2.2 (H) <=1.5 mg/dL    ALP 45 40 - 150 unit/L    ALT 13 0 - 55 unit/L    AST 23 5 - 34 unit/L    eGFR >60 mL/min/1.73/m2    Anion Gap 7.0 mEq/L    BUN/Creatinine Ratio 17    Magnesium    Collection Time: 06/07/24  2:37 AM   Result Value Ref Range    Magnesium Level 1.90 1.60 - 2.60 mg/dL   MRSA PCR     Collection Time: 06/07/24  5:22 AM   Result Value Ref Range    MRSA PCR Screen Not Detected Not Detected   POCT glucose    Collection Time: 06/07/24  7:55 AM   Result Value Ref Range    POCT Glucose 97 70 - 110 mg/dL     EKG:      Telemetry:  Sinus Rhythm    Physical Exam  Vitals and nursing note reviewed.   Constitutional:       General: He is not in acute distress.     Appearance: Normal appearance. He is obese. He is not ill-appearing.   HENT:      Head: Normocephalic.      Mouth/Throat:      Mouth: Mucous membranes are moist.      Pharynx: Oropharynx is clear.   Cardiovascular:      Rate and Rhythm: Normal rate and regular rhythm.      Pulses:           Dorsalis pedis pulses are 1+ on the right side and 1+ on the left side.      Comments: Left Arm Warm. + Doppler Left Ulnar Pulse noted. Hand is cool.   Pulmonary:      Effort: No respiratory distress.      Breath sounds: Normal breath sounds. No wheezing or rales.      Comments: NC  Abdominal:      General: There is no distension.      Palpations: Abdomen is soft.      Tenderness: There is no abdominal tenderness. There is no guarding.   Genitourinary:     Comments: Brannon Catheter   Musculoskeletal:         General: Normal range of motion.      Cervical back: Neck supple.      Right lower leg: No edema.      Left lower leg: No edema.      Comments: BLE Warm   Skin:     Comments: Bilateral Groins soft with no evidence of hematoma noted. Right Groin [place] in place, secured. Left Axillary Impella Site noted, with bloody drainage on dressing, no significant active bleeding noted.   Neurological:      General: No focal deficit present.      Mental Status: He is alert and oriented to person, place, and time. Mental status is at baseline.   Psychiatric:         Mood and Affect: Mood normal.         Behavior: Behavior normal.         Judgment: Judgment normal.       Current Inpatient Medications:    Current Facility-Administered Medications:     0.9%  NaCl infusion (for  blood administration), , Intravenous, Q24H PRN, Lon Zuleta MD    acetaminophen tablet 650 mg, 650 mg, Oral, Q4H PRN, Almas Vaz PA-C, 650 mg at 06/06/24 2250    aspirin EC tablet 81 mg, 81 mg, Oral, Daily, Brant Flores MD, 81 mg at 06/07/24 0800    carvediloL tablet 6.25 mg, 6.25 mg, Oral, BID, Brant Flores MD, 6.25 mg at 06/07/24 0800    cefazolin (ANCEF) 2 gram in dextrose 5% 50 mL IVPB (premix), 2 g, Intravenous, On Call Procedure, Isabela Wilkes, CELESTE    clopidogreL tablet 75 mg, 75 mg, Oral, Daily, Brant Flores MD, 75 mg at 06/07/24 0801    cyclobenzaprine tablet 10 mg, 10 mg, Oral, TID PRN, Inés Corona DO, 10 mg at 06/07/24 0837    diclofenac sodium 1 % gel 2 g, 2 g, Topical (Top), Daily PRN, Inés Corona DO, 2 g at 06/05/24 2002    gabapentin capsule 100 mg, 100 mg, Oral, BID, Brant Flores MD, 100 mg at 06/07/24 0800    heparin 25,000 units in dextrose 5% 250 mL (100 units/mL) infusion (heparin infusion - NO NOMOGRAM), 3 Units/kg/hr (Adjusted), Intravenous, Continuous, Almas Vaz PA-C    hydrALAZINE injection 10 mg, 10 mg, Intravenous, Q4H PRN, Brant Flores MD, 10 mg at 06/06/24 1910    magnesium oxide tablet 400 mg, 400 mg, Oral, BID, Briana Becerra, CELESTE    morphine injection 2 mg, 2 mg, Intravenous, Q4H PRN, Robert Nash MD, 2 mg at 06/06/24 1823    mupirocin 2 % ointment, , Nasal, On Call Procedure, Isabela Wilkes, FNP    ondansetron disintegrating tablet 8 mg, 8 mg, Oral, Q8H PRN, Mliind, Almas C, PA-C    pantoprazole EC tablet 40 mg, 40 mg, Oral, Daily, Briana Becerra T, FNP, 40 mg at 06/07/24 0800    sodium bicarbonate 25 mEq in dextrose 5 % (D5W) 1,000 mL infusion, , Intravenous, Continuous, Lon Zuleta MD    Facility-Administered Medications Ordered in Other Encounters:     diazePAM tablet 10 mg, 10 mg, Oral, On Call Procedure, Constantin Martino MD, 10 mg at 04/19/24 0729    diphenhydrAMINE capsule 50 mg, 50 mg, Oral, On Call Procedure, Salena  Constantin BLAKE MD, 50 mg at 07/29/22 0844    diphenhydrAMINE capsule 50 mg, 50 mg, Oral, On Call Procedure, Constantin Martino MD, 50 mg at 04/19/24 0730    sodium chloride 0.9% flush 10 mL, 10 mL, Intravenous, PRN, Constantin Martino MD    sodium chloride 0.9% flush 10 mL, 10 mL, Intravenous, PRN, Constantin Martino MD  VTE Risk Mitigation (From admission, onward)           Ordered     IP VTE HIGH RISK PATIENT  Once         06/06/24 2151     heparin 25,000 units in dextrose 5% 250 mL (100 units/mL) infusion (heparin infusion - NO NOMOGRAM)  Continuous         06/05/24 0924                  Assessment:   CAD/CABG (LIMA to LAD, LRA to OM, SVG to [place], 1999)    - Status Post LM into LAD PCI/Stenting with Left Axillary Impella Assist (6.6.24) (Dr. Nettles)    - Status Post MV PCI: PCI Ostial Radial Graft to OM (KALYANI), Sock Wave Lithotripsy of LM & Proximal LAD, Laser Atherectomy of LIMA to LAD & PTCA for ISR, PTCA Distal LAD, Failed Attempt at Retrograde  PCI of RCA with 5.5 L Impella Support (6.5.24) (Dr. Flores)  ICMO EF 30%    - Status Post Axillary Impella Placement (Re: MV PCI)  Hypertension (BP Stable)  Hyperlipidemia  HERMINIA (Severe)  Elevated BMI/Obesity  Anemia   Leukocytosis (Resolved)     - Afebrile   Peripheral Neuropathy  History of CVA    Plan:   Continue DAPT (Aspirin 81 Mg Daily & Plavix 75 Mg Daily)  Optimize Guideline Directed Medical Therapy for ICMO: Coreg 6.25 Mg PO BID. Start Entresto 24/26 Mg PO BID in AM (Hold Parameters)- Will hold off on resuming Lisinopril (Home Medication).  Consider Starting SGLT2 Inhibitor prior to discharge  Continue Protonix for Gastric Protection  Impella Explant Scheduled for Today with Surgical Team  Replete Potassium/Mag (Done)  Add back Home Medications  Monitor H/H- Hold off on Transfusion for now as Hgb ~ 10.7.   Mobilize as able once Impella Removed.    CELESTE Burns  Cardiology  [facility] - [facility] ICU  06/07/2024

## 2024-06-07 NOTE — ANESTHESIA PREPROCEDURE EVALUATION
06/07/2024  Carlos Waller is a 68 y.o., male.    Brief HPI/Hospital Course:   Mr. Waller is a 68 year old male, known to Dr. Martino, who underwent elective outpatient MV PCI with Surgical Impella assist by Dr. Flores on 6.5.24. He underwent PCI of Radial Graft Supplying Native OM, Laser Atherectomy of LIMA to LAD with balloon angioplasty for ISR, and balloon angioplasty of distal Native LAD. Noted was failed attempt at PCI of Native RCA , however, patient did have evidence of left to right collaterals. Patient did lose some blood during the procedure, however, post procedure his H/H remained stable. Imeplla was kept in place, hemodynamics stable with no recurrent angina post intervention. Patient admitted to ICU for close monitoring and Impella management.     Hospital Course:  6.6.24: NAD Noted. SR in the 70's. BP Stable. Impella Left Axillary stable with some oozing of blood at insertion site (slow). Area is bandaged and being closely monitored. Stable at current time. P6 Support. PAP 30.6 (15), CVP 6, Adequate UO noted. Denies CP/SOB. Does report lower back pain related to lying flat, had some left forearm pain yesterday and throughout the night which has improved. Right Groin [place] site and bilateral groins soft flat with no evidence of bleed or hematoma noted. He is NPO for LM Intervention with Dr. Nettles today.   6.7.24: NAD Noted. Underwent LHC/PCI Yesterday with Dr. Nettles and tolerated well. No CP/SOB. BP Stable. /60, PAP 37/15 (23), CVP 10.      PMH: CAD/CABG, Abnormal Stress Test, Obesity, Anemia, ICMO, Severe HERMINIA, Hypertension, Hyperlipidemia, CVA  PSH: LHC, CABG, Hernia Repair, Colonoscopy  Family History: Father- Pancreatic Cancer, Mother- CVA  Social History: Tobacco- Negative, Alcohol- Negative, Substance Abuse- Negative    Past Medical History:   Diagnosis Date    Anemia,  unspecified     Arthritis     Chest pain     Coronary artery disease     Coronary artery disease involving coronary bypass graft of native heart, unspecified whether angina present     Hyperlipidemia     Hypertension     Obesity     Right shoulder pain     Sleep apnea     Stroke     left side numbness      Past Surgical History:   Procedure Laterality Date    CARDIAC CATHETERIZATION  2016    COLONOSCOPY W/ POLYPECTOMY      CORONARY ANGIOGRAPHY INCLUDING BYPASS GRAFTS WITH CATHETERIZATION OF LEFT HEART Left 07/29/2022    Procedure: ANGIOGRAM, CORONARY, INCLUDING BYPASS GRAFT, WITH LEFT HEART CATHETERIZATION;  Surgeon: Constantin Martino MD;  Location: [facility] CATH [facility];  Service: Cardiology;  Laterality: Left;    CORONARY ARTERY BYPASS GRAFT  1999    CORONARY BYPASS GRAFT ANGIOGRAPHY  04/19/2024    Procedure: Bypass graft study;  Surgeon: Constantin Martino MD;  Location: [facility] CATH [facility];  Service: Cardiology;;    CORONARY STENT PLACEMENT      x2    HERNIA REPAIR      failed    INTRAVASCULAR ULTRASOUND, CORONARY N/A 6/6/2024    Procedure: Ultrasound-coronary;  Surgeon: Almas Nettles Jr., MD;  Location: [facility];  Service: Cardiology;  Laterality: N/A;    LEFT HEART CATHETERIZATION Left 04/19/2024    Procedure: Left heart cath;  Surgeon: Constantin Martino MD;  Location: [facility];  Service: Cardiology;  Laterality: Left;  LHC +/- PCI VIA RT FEMORAL ARTERY    LEFT HEART CATHETERIZATION N/A 6/6/2024    Procedure: Left heart cath;  Surgeon: Almas Nettles Jr., MD;  Location: [facility];  Service: Cardiology;  Laterality: N/A;    LITHOTRIPSY, CORONARY TRANSLUMINAL, PERCUTANEOUS  6/6/2024    Procedure: LITHOTRIPSY, CORONARY TRANSLUMINAL, PERCUTANEOUS;  Surgeon: Almas Nettles Jr., MD;  Location: [facility];  Service: Cardiology;;    PERCUTANEOUS TRANSLUMINAL BALLOON ANGIOPLASTY OF BYPASS GRAFT N/A 07/29/2022    Procedure: PTA, BYPASS GRAFT, USING BALLOON;  Surgeon: Constantin Martino MD;  Location: [facility];   Service: Cardiology;  Laterality: N/A;    STENT, DRUG ELUTING, SINGLE VESSEL, CORONARY  6/6/2024    Procedure: Stent, Drug Eluting, Single Vessel, Coronary;  Surgeon: Almas Nettles Jr., MD;  Location: [facility] C[facility];  Service: Cardiology;;      Current Facility-Administered Medications on File Prior to Encounter   Medication Dose Route Frequency Provider Last Rate Last Admin    diazePAM tablet 10 mg  10 mg Oral On Call Procedure Constantin Martino MD   10 mg at 04/19/24 0729    diphenhydrAMINE capsule 50 mg  50 mg Oral On Call Procedure Constantin Martino MD   50 mg at 07/29/22 0844    diphenhydrAMINE capsule 50 mg  50 mg Oral On Call Procedure Constantin Martino MD   50 mg at 04/19/24 0730    sodium chloride 0.9% flush 10 mL  10 mL Intravenous PRN Constantin Martino MD        sodium chloride 0.9% flush 10 mL  10 mL Intravenous PRN Constantin Martino MD         Current Outpatient Medications on File Prior to Encounter   Medication Sig Dispense Refill    aspirin (ECOTRIN) 81 MG EC tablet Take 81 mg by mouth once daily.      carvediloL (COREG) 6.25 MG tablet Take 6.25 mg by mouth 2 (two) times daily.      clopidogreL (PLAVIX) 75 mg tablet Take 75 mg by mouth once daily.      cyclobenzaprine (FLEXERIL) 10 MG tablet Take 10 mg by mouth 3 (three) times daily as needed for Muscle spasms.      gabapentin (NEURONTIN) 100 MG capsule Take 100 mg by mouth 2 (two) times daily.      isosorbide mononitrate (IMDUR) 30 MG 24 hr tablet Take 30 mg by mouth once daily.      lisinopriL (PRINIVIL,ZESTRIL) 30 MG tablet Take 20 mg by mouth once daily.      meloxicam (MOBIC) 7.5 MG tablet Take 7.5 mg by mouth daily as needed for Pain.      nitroGLYCERIN (NITROSTAT) 0.4 MG SL tablet Place 0.4 mg under the tongue every 5 (five) minutes as needed for Chest pain.      omeprazole (PRILOSEC) 40 MG capsule Take 40 mg by mouth once daily.      ranolazine (RANEXA) 500 MG Tb12 Take 500 mg by mouth 2 (two) times daily.      rosuvastatin (CRESTOR) 20 MG  tablet Take 20 mg by mouth once daily.      levocetirizine (XYZAL) 5 MG tablet Take 5 mg by mouth daily as needed for Allergies.        EF 30% by recent TTE    Pre-op Assessment    I have reviewed the Patient Summary Reports.     I have reviewed the Nursing Notes. I have reviewed the NPO Status.   I have reviewed the Medications.     Review of Systems  Anesthesia Hx:  No problems with previous Anesthesia                Hematology/Oncology:       -- Anemia (Hgb 10.7):                                  Cardiovascular:  Exercise tolerance: poor   Hypertension   CAD   CABG/stent       PVD hyperlipidemia                             Pulmonary:        Sleep Apnea                Hepatic/GI:     GERD             Musculoskeletal:  Arthritis               Neurological:   CVA                                    Endocrine:        Obesity / BMI > 30      Physical Exam  General: Cooperative, Alert and Oriented    Airway:  Mallampati: III   Mouth Opening: Normal  TM Distance: Normal  Tongue: Normal  Neck ROM: Extension Decreased    Dental:  Periodontal disease        Anesthesia Plan  Type of Anesthesia, risks & benefits discussed:    Anesthesia Type: Gen Natural Airway  Intra-op Monitoring Plan: Standard ASA Monitors  Post Op Pain Control Plan: multimodal analgesia  Induction:  IV  Informed Consent: Informed consent signed with the Patient and all parties understand the risks and agree with anesthesia plan.  All questions answered. Patient consented to blood products? Yes  ASA Score: 3  Day of Surgery Review of History & Physical: H&P Update referred to the surgeon/provider.I have interviewed and examined the patient. I have reviewed the patient's H&P dated: There are no significant changes.     Ready For Surgery From Anesthesia Perspective.     .

## 2024-06-08 LAB
ALBUMIN SERPL-MCNC: 2.9 G/DL (ref 3.4–4.8)
ALBUMIN/GLOB SERPL: 1.4 RATIO (ref 1.1–2)
ALP SERPL-CCNC: 42 UNIT/L (ref 40–150)
ALT SERPL-CCNC: 16 UNIT/L (ref 0–55)
ANION GAP SERPL CALC-SCNC: 6 MEQ/L
AST SERPL-CCNC: 24 UNIT/L (ref 5–34)
BASOPHILS # BLD AUTO: 0.02 X10(3)/MCL
BASOPHILS NFR BLD AUTO: 0.2 %
BILIRUB SERPL-MCNC: 1.5 MG/DL
BUN SERPL-MCNC: 18.6 MG/DL (ref 8.4–25.7)
CALCIUM SERPL-MCNC: 7.9 MG/DL (ref 8.8–10)
CHLORIDE SERPL-SCNC: 111 MMOL/L (ref 98–107)
CO2 SERPL-SCNC: 20 MMOL/L (ref 23–31)
CREAT SERPL-MCNC: 0.79 MG/DL (ref 0.73–1.18)
CREAT/UREA NIT SERPL: 24
EOSINOPHIL # BLD AUTO: 0.11 X10(3)/MCL (ref 0–0.9)
EOSINOPHIL NFR BLD AUTO: 1.2 %
ERYTHROCYTE [DISTWIDTH] IN BLOOD BY AUTOMATED COUNT: 12.7 % (ref 11.5–17)
GFR SERPLBLD CREATININE-BSD FMLA CKD-EPI: >60 ML/MIN/1.73/M2
GLOBULIN SER-MCNC: 2.1 GM/DL (ref 2.4–3.5)
GLUCOSE SERPL-MCNC: 107 MG/DL (ref 82–115)
HCT VFR BLD AUTO: 28.2 % (ref 42–52)
HCT VFR BLD AUTO: 29.6 % (ref 42–52)
HGB BLD-MCNC: 9.3 G/DL (ref 14–18)
HGB BLD-MCNC: 9.6 G/DL (ref 14–18)
IMM GRANULOCYTES # BLD AUTO: 0.04 X10(3)/MCL (ref 0–0.04)
IMM GRANULOCYTES NFR BLD AUTO: 0.4 %
LYMPHOCYTES # BLD AUTO: 1.67 X10(3)/MCL (ref 0.6–4.6)
LYMPHOCYTES NFR BLD AUTO: 18.3 %
MAGNESIUM SERPL-MCNC: 2 MG/DL (ref 1.6–2.6)
MCH RBC QN AUTO: 31.5 PG (ref 27–31)
MCHC RBC AUTO-ENTMCNC: 33 G/DL (ref 33–36)
MCV RBC AUTO: 95.6 FL (ref 80–94)
MONOCYTES # BLD AUTO: 1.1 X10(3)/MCL (ref 0.1–1.3)
MONOCYTES NFR BLD AUTO: 12.1 %
NEUTROPHILS # BLD AUTO: 6.18 X10(3)/MCL (ref 2.1–9.2)
NEUTROPHILS NFR BLD AUTO: 67.8 %
NRBC BLD AUTO-RTO: 0 %
PLATELET # BLD AUTO: 154 X10(3)/MCL (ref 130–400)
PMV BLD AUTO: 10.4 FL (ref 7.4–10.4)
POTASSIUM SERPL-SCNC: 4.3 MMOL/L (ref 3.5–5.1)
PROT SERPL-MCNC: 5 GM/DL (ref 5.8–7.6)
RBC # BLD AUTO: 2.95 X10(6)/MCL (ref 4.7–6.1)
SODIUM SERPL-SCNC: 137 MMOL/L (ref 136–145)
WBC # SPEC AUTO: 9.12 X10(3)/MCL (ref 4.5–11.5)

## 2024-06-08 PROCEDURE — 99024 POSTOP FOLLOW-UP VISIT: CPT | Mod: ,,, | Performed by: PHYSICIAN ASSISTANT

## 2024-06-08 PROCEDURE — 63600175 PHARM REV CODE 636 W HCPCS: Performed by: STUDENT IN AN ORGANIZED HEALTH CARE EDUCATION/TRAINING PROGRAM

## 2024-06-08 PROCEDURE — 25000003 PHARM REV CODE 250: Performed by: STUDENT IN AN ORGANIZED HEALTH CARE EDUCATION/TRAINING PROGRAM

## 2024-06-08 PROCEDURE — 85018 HEMOGLOBIN: CPT | Performed by: STUDENT IN AN ORGANIZED HEALTH CARE EDUCATION/TRAINING PROGRAM

## 2024-06-08 PROCEDURE — 25000003 PHARM REV CODE 250: Performed by: INTERNAL MEDICINE

## 2024-06-08 PROCEDURE — 21400001 HC TELEMETRY ROOM

## 2024-06-08 PROCEDURE — 80053 COMPREHEN METABOLIC PANEL: CPT | Performed by: NURSE PRACTITIONER

## 2024-06-08 PROCEDURE — 83735 ASSAY OF MAGNESIUM: CPT | Performed by: NURSE PRACTITIONER

## 2024-06-08 PROCEDURE — 25000003 PHARM REV CODE 250: Performed by: NURSE PRACTITIONER

## 2024-06-08 PROCEDURE — 25000003 PHARM REV CODE 250

## 2024-06-08 PROCEDURE — 36415 COLL VENOUS BLD VENIPUNCTURE: CPT | Performed by: STUDENT IN AN ORGANIZED HEALTH CARE EDUCATION/TRAINING PROGRAM

## 2024-06-08 PROCEDURE — 63600175 PHARM REV CODE 636 W HCPCS: Performed by: HOSPITALIST

## 2024-06-08 PROCEDURE — 85025 COMPLETE CBC W/AUTO DIFF WBC: CPT | Performed by: NURSE PRACTITIONER

## 2024-06-08 PROCEDURE — 25000003 PHARM REV CODE 250: Performed by: PHYSICIAN ASSISTANT

## 2024-06-08 PROCEDURE — 63600175 PHARM REV CODE 636 W HCPCS: Performed by: PHYSICIAN ASSISTANT

## 2024-06-08 RX ORDER — ATORVASTATIN CALCIUM 40 MG/1
40 TABLET, FILM COATED ORAL DAILY
Status: DISCONTINUED | OUTPATIENT
Start: 2024-06-08 | End: 2024-06-10 | Stop reason: HOSPADM

## 2024-06-08 RX ADMIN — RANOLAZINE 500 MG: 500 TABLET, EXTENDED RELEASE ORAL at 08:06

## 2024-06-08 RX ADMIN — SACUBITRIL AND VALSARTAN 1 TABLET: 24; 26 TABLET, FILM COATED ORAL at 08:06

## 2024-06-08 RX ADMIN — CLOPIDOGREL BISULFATE 75 MG: 75 TABLET ORAL at 08:06

## 2024-06-08 RX ADMIN — MORPHINE SULFATE 2 MG: 4 INJECTION INTRAVENOUS at 08:06

## 2024-06-08 RX ADMIN — OXYCODONE HYDROCHLORIDE AND ACETAMINOPHEN 1 TABLET: 5; 325 TABLET ORAL at 04:06

## 2024-06-08 RX ADMIN — DICLOFENAC SODIUM 2 G: 10 GEL TOPICAL at 08:06

## 2024-06-08 RX ADMIN — PANTOPRAZOLE SODIUM 40 MG: 40 TABLET, DELAYED RELEASE ORAL at 08:06

## 2024-06-08 RX ADMIN — ASPIRIN 81 MG: 81 TABLET, COATED ORAL at 08:06

## 2024-06-08 RX ADMIN — ATORVASTATIN CALCIUM 40 MG: 40 TABLET, FILM COATED ORAL at 01:06

## 2024-06-08 RX ADMIN — Medication 400 MG: at 08:06

## 2024-06-08 RX ADMIN — CARVEDILOL 6.25 MG: 3.12 TABLET, FILM COATED ORAL at 08:06

## 2024-06-08 RX ADMIN — SODIUM CHLORIDE, POTASSIUM CHLORIDE, SODIUM LACTATE AND CALCIUM CHLORIDE 500 ML: 600; 310; 30; 20 INJECTION, SOLUTION INTRAVENOUS at 02:06

## 2024-06-08 RX ADMIN — KETOROLAC TROMETHAMINE 30 MG: 30 INJECTION, SOLUTION INTRAMUSCULAR; INTRAVENOUS at 12:06

## 2024-06-08 RX ADMIN — GABAPENTIN 100 MG: 100 CAPSULE ORAL at 08:06

## 2024-06-08 RX ADMIN — OXYCODONE HYDROCHLORIDE AND ACETAMINOPHEN 1 TABLET: 5; 325 TABLET ORAL at 10:06

## 2024-06-08 RX ADMIN — CYCLOBENZAPRINE 10 MG: 10 TABLET, FILM COATED ORAL at 04:06

## 2024-06-08 RX ADMIN — ISOSORBIDE MONONITRATE 30 MG: 30 TABLET, EXTENDED RELEASE ORAL at 08:06

## 2024-06-08 RX ADMIN — CYCLOBENZAPRINE 10 MG: 10 TABLET, FILM COATED ORAL at 10:06

## 2024-06-08 NOTE — PROGRESS NOTES
" [facility] - [facility]    Cardiology  Progress Note    Patient Name: Carlos Waller  MRN: [id number]  Admission Date: 6/5/2024  Hospital Length of Stay: 3 days  Code Status: Full Code   Attending Physician: Lon Zuleta MD   Primary Care Physician: Louis Monahan Jr., MD  Expected Discharge Date:   Principal Problem:<principal problem not specified>    Subjective:   Brief HPI/Hospital Course:   Mr. Waller is a 68 year old male, known to Dr. Martino, who underwent elective outpatient MV PCI with Surgical Impella assist by Dr. Flores on 6.5.24. He underwent PCI of Radial Graft Supplying Native OM, Laser Atherectomy of LIMA to LAD with balloon angioplasty for ISR, and balloon angioplasty of distal Native LAD. Noted was failed attempt at PCI of Native RCA , however, patient did have evidence of left to right collaterals. Patient did lose some blood during the procedure, however, post procedure his H/H remained stable. Imeplla was kept in place, hemodynamics stable with no recurrent angina post intervention. Patient admitted to ICU for close monitoring and Impella management.    Hospital Course:  6.6.24: NAD Noted. SR in the 70's. BP Stable. Impella Left Axillary stable with some oozing of blood at insertion site (slow). Area is bandaged and being closely monitored. Stable at current time. P6 Support. PAP 30.6 (15), CVP 6, Adequate UO noted. Denies CP/SOB. Does report lower back pain related to lying flat, had some left forearm pain yesterday and throughout the night which has improved. Right Groin Aguirre site and bilateral groins soft flat with no evidence of bleed or hematoma noted. He is NPO for LM Intervention with Dr. Nettles today.   6.7.24: NAD Noted. Underwent LHC/PCI Yesterday with Dr. Nettles and tolerated well. No CP/SOB. BP Stable. /60, PAP 37/15 (23), CVP 10.   6.8.24: NAD. VSS. No complaints of CP/SOB/Palps. "I feel okay" H&H 9.6/29.6. Impella removed yesterday by CV Surgery "     PMH: CAD/CABG, Abnormal Stress Test, Obesity, Anemia, ICMO, Severe HERMINIA, Hypertension, Hyperlipidemia, CVA  PSH: LHC, CABG, Hernia Repair, Colonoscopy  Family History: Father- Pancreatic Cancer, Mother- CVA  Social History: Tobacco- Negative, Alcohol- Negative, Substance Abuse- Negative    Previous Diagnostics:  Coronary Angiogram (6.6.24):  PCI of LM into LAD  Left Axillary Impella Assist  Surgeon: Dr. Tho Barnes Echocardiogram (6.5.24):  Limited study to assess impella placement. Impella measures approximately 4.1cm from aortic valve into the left ventricle. EF visually appears 30%.    LHC/RHC MV PCI (6.5.24):  Preprocedure diagnosis-abnormal stress test, surgical turndown, Class 3 angina, chronic systolic heart failure  Postprocedure diagnosis-Obstructive CAD  Estimated blood loss-40 cc  Tissue removal-none  Complications-none  Procedures performed:  Ultrasound-guided bilateral groin access  Coronary, SVG, LIMA angiography  Failed attempt at retrograde  PCI of RCA (unable to cannulate RCA with guide while 5.5 impella was in place)  Shockwave lithotripsy of LM and proximal LAD with 3.5 balloon  IVUS of LM, Radial graft to OM  PCI of ostial radial graft with 4x16 synergy KALYANI  Laser atherectomy of LIMA to LAD, PTCA for ISR with 2.5 NC balloon at 20 yesenia.  PTCA native distal LAD with 2.0 balloon.  Intracoronary nitro and adenosine administration to distal LAD  Perclose Right CFA, angioseal Left CFA  RHC  Findings:  Left main-80% calcified stenosis reduced to 60%   LAD-MID   LCX-  RCA-, Left to right collaterals  Radial graft to OM 90% reduced to 0% post PCI  LIMA to LAD-In stent  reduced to 10% post laser, PTA.  LAD is a bifid system distally  Pa sat pre pci 75, post 70  PAP 33/11 pre, 33/16 post    Impella Placement (Dr. Zuleta) (6.5.24):  5.5 Liter Placement with 10 mm Dacron Conduit via left axillary approach.    Echocardiogram (4.19.24):  Left Ventricle: Regional wall motion  abnormalities present. The apical lateral, anterior and inferior walls are all hypokinetic. Basal and mid walls all augment WNL. There is moderately reduced systolic function with a visually estimated ejection fraction of 35 - 40%.  Tricuspid Valve: There is mild regurgitation.    Echocardiogram (11.9.23):  The study quality is below average.   The left ventricle is normal in size. Global left ventricular systolic function is normal. The left ventricular ejection fraction is 55%. Left ventricular diastolic function is normal. Noted left ventricular hypertrophy. Concentric left ventricular hypertrophy is present. It is mild.   Optison, ultrasound image enhancement, was utilized on this study per standing order for better visualization of left ventricular endocardium.  The patients IV was started in the right arm by Luisana Dent RN. 2ml imaging enhancement during image acquisition, 1ml was wasted. The IV was removed upon completion of the study.   The right atrium is moderately enlarged. The left atrial diameter is mildly increased.  Mild (1+) mitral regurgitation. Mild (1+) tricuspid regurgitation. Mild (1+) pulmonic regurgitation.   The pulmonary artery systolic pressure is 12 mmHg.     CABG (7.16.99):  LIMA to LAD, Left Radial Artery to OM Branch, SVG to Diagonal Branch  Surgeon: Dr. Washington    Review of Systems   Cardiovascular:  Negative for chest pain, dyspnea on exertion, leg swelling and palpitations.   Respiratory:  Negative for shortness of breath.    All other systems reviewed and are negative.    Objective:     Vital Signs (Most Recent):  Temp: 98.3 °F (36.8 °C) (06/08/24 0800)  Pulse: 76 (06/08/24 0800)  Resp: 10 (06/08/24 0800)  BP: 121/62 (06/08/24 0800)  SpO2: 98 % (06/08/24 0800) Vital Signs (24h Range):  Temp:  [98.3 °F (36.8 °C)-99.1 °F (37.3 °C)] 98.3 °F (36.8 °C)  Pulse:  [58-80] 76  Resp:  [6-32] 10  SpO2:  [90 %-100 %] 98 %  BP: ()/(40-64) 121/62  Arterial Line BP: ()/() 156/154    Weight: 121.5 kg (267 lb 13.7 oz)  Body mass index is 38.43 kg/m².  SpO2: 98 %       Intake/Output Summary (Last 24 hours) at 6/8/2024 1121  Last data filed at 6/8/2024 0419  Gross per 24 hour   Intake 996.24 ml   Output 135 ml   Net 861.24 ml     Lines/Drains/Airways       Peripheral Intravenous Line  Duration                  Peripheral IV - Single Lumen 06/05/24 0709 18 G Left Hand 3 days                  Significant Labs:   Recent Results (from the past 72 hour(s))   ISTAT PROCEDURE    Collection Time: 06/05/24  1:33 PM   Result Value Ref Range    POC PH 7.391 7.35 - 7.45    POC PCO2 40.1 35 - 45 mmHg    POC PO2 149 (H) 80 - 100 mmHg    POC HCO3 24.3 24 - 28 mmol/L    POC BE -1 -2 to 2 mmol/L    POC SATURATED O2 99 95 - 100 %    POC Glucose 127 (H) 70 - 110 mg/dL    POC Sodium 136 136 - 145 mmol/L    POC Potassium 3.9 3.5 - 5.1 mmol/L    POC TCO2 26 23 - 27 mmol/L    POC Ionized Calcium 1.30 1.06 - 1.42 mmol/L    POC Hematocrit 39 36 - 54 %PCV    POC HEMOGLOBIN 13 g/dL    Sample ARTERIAL    Type & Screen    Collection Time: 06/05/24  4:19 PM   Result Value Ref Range    Group & Rh O NEG     Indirect Julián GEL NEG     Specimen Outdate 06/08/2024 23:59    Comprehensive Metabolic Panel    Collection Time: 06/05/24  4:19 PM   Result Value Ref Range    Sodium 137 136 - 145 mmol/L    Potassium 3.9 3.5 - 5.1 mmol/L    Chloride 106 98 - 107 mmol/L    CO2 21 (L) 23 - 31 mmol/L    Glucose 164 (H) 82 - 115 mg/dL    Blood Urea Nitrogen 17.6 8.4 - 25.7 mg/dL    Creatinine 0.80 0.73 - 1.18 mg/dL    Calcium 8.5 (L) 8.8 - 10.0 mg/dL    Protein Total 5.8 5.8 - 7.6 gm/dL    Albumin 3.5 3.4 - 4.8 g/dL    Globulin 2.3 (L) 2.4 - 3.5 gm/dL    Albumin/Globulin Ratio 1.5 1.1 - 2.0 ratio    Bilirubin Total 2.2 (H) <=1.5 mg/dL    ALP 52 40 - 150 unit/L    ALT 17 0 - 55 unit/L    AST 26 5 - 34 unit/L    eGFR >60 mL/min/1.73/m2    Anion Gap 10.0 mEq/L    BUN/Creatinine Ratio 22    CBC with Differential    Collection Time: 06/05/24  4:19  PM   Result Value Ref Range    WBC 14.73 (H) 4.50 - 11.50 x10(3)/mcL    RBC 4.29 (L) 4.70 - 6.10 x10(6)/mcL    Hgb 13.7 (L) 14.0 - 18.0 g/dL    Hct 40.8 (L) 42.0 - 52.0 %    MCV 95.1 (H) 80.0 - 94.0 fL    MCH 31.9 (H) 27.0 - 31.0 pg    MCHC 33.6 33.0 - 36.0 g/dL    RDW 12.6 11.5 - 17.0 %    Platelet 264 130 - 400 x10(3)/mcL    MPV 10.6 (H) 7.4 - 10.4 fL    Neut % 77.5 %    Lymph % 15.0 %    Mono % 6.5 %    Eos % 0.2 %    Basophil % 0.3 %    Lymph # 2.21 0.6 - 4.6 x10(3)/mcL    Neut # 11.42 (H) 2.1 - 9.2 x10(3)/mcL    Mono # 0.96 0.1 - 1.3 x10(3)/mcL    Eos # 0.03 0 - 0.9 x10(3)/mcL    Baso # 0.04 <=0.2 x10(3)/mcL    IG# 0.07 (H) 0 - 0.04 x10(3)/mcL    IG% 0.5 %    NRBC% 0.0 %   Prepare RBC 4 Units; on call to or    Collection Time: 06/05/24  4:19 PM   Result Value Ref Range    UNIT NUMBER [id number]     UNIT ABO/RH O NEG     DISPENSE STATUS Selected     Unit Expiration [id number]     Product Code O5737N00     Unit Blood Type Code 9500     CROSSMATCH INTERPRETATION Compatible     UNIT NUMBER [id number]     UNIT ABO/RH O NEG     DISPENSE STATUS Selected     Unit Expiration [id number]     Product Code N8741J56     Unit Blood Type Code 9500     CROSSMATCH INTERPRETATION Compatible     UNIT NUMBER [id number]     UNIT ABO/RH O NEG     DISPENSE STATUS Selected     Unit Expiration [id number]     Product Code O9618V74     Unit Blood Type Code 9500     CROSSMATCH INTERPRETATION Compatible     UNIT NUMBER [id number]     UNIT ABO/RH O NEG     DISPENSE STATUS Selected     Unit Expiration [id number]     Product Code [id number]     Unit Blood Type Code 9500     CROSSMATCH INTERPRETATION Compatible    CBC with Differential    Collection Time: 06/05/24  5:17 PM   Result Value Ref Range    WBC 15.04 (H) 4.50 - 11.50 x10(3)/mcL    RBC 4.35 (L) 4.70 - 6.10 x10(6)/mcL    Hgb 13.9 (L) 14.0 - 18.0 g/dL    Hct 40.8 (L) 42.0 - 52.0 %    MCV 93.8 80.0 - 94.0 fL    MCH 32.0 (H) 27.0 - 31.0 pg    MCHC 34.1 33.0 - 36.0 g/dL     RDW 12.6 11.5 - 17.0 %    Platelet 276 130 - 400 x10(3)/mcL    MPV 10.2 7.4 - 10.4 fL    Neut % 81.5 %    Lymph % 11.8 %    Mono % 6.1 %    Eos % 0.1 %    Basophil % 0.2 %    Lymph # 1.77 0.6 - 4.6 x10(3)/mcL    Neut # 12.26 (H) 2.1 - 9.2 x10(3)/mcL    Mono # 0.92 0.1 - 1.3 x10(3)/mcL    Eos # 0.02 0 - 0.9 x10(3)/mcL    Baso # 0.03 <=0.2 x10(3)/mcL    IG# 0.04 0 - 0.04 x10(3)/mcL    IG% 0.3 %    NRBC% 0.0 %   Protime-INR    Collection Time: 06/05/24  5:17 PM   Result Value Ref Range    PT 14.5 12.5 - 14.5 seconds    INR 1.2 <=1.3   CK    Collection Time: 06/05/24  5:17 PM   Result Value Ref Range    Creatine Kinase 240 (H) 30 - 200 U/L   CK-MB    Collection Time: 06/05/24  5:17 PM   Result Value Ref Range    Creatine Kinase MB 3.6 <=7.2 ng/mL   APTT    Collection Time: 06/05/24  5:17 PM   Result Value Ref Range    PTT 93.8 (H) 23.2 - 33.7 seconds   Basic Metabolic Panel    Collection Time: 06/05/24  5:17 PM   Result Value Ref Range    Sodium 137 136 - 145 mmol/L    Potassium 4.0 3.5 - 5.1 mmol/L    Chloride 106 98 - 107 mmol/L    CO2 18 (L) 23 - 31 mmol/L    Glucose 150 (H) 82 - 115 mg/dL    Blood Urea Nitrogen 17.1 8.4 - 25.7 mg/dL    Creatinine 0.73 0.73 - 1.18 mg/dL    BUN/Creatinine Ratio 23     Calcium 8.6 (L) 8.8 - 10.0 mg/dL    Anion Gap 13.0 mEq/L    eGFR >60 mL/min/1.73/m2   EKG 12-LEAD on arrival to floor    Collection Time: 06/05/24  5:35 PM   Result Value Ref Range    QRS Duration 100 ms    OHS QTC Calculation 468 ms   POCT glucose    Collection Time: 06/05/24 10:23 PM   Result Value Ref Range    POCT Glucose 133 (H) 70 - 110 mg/dL   RT Blood Gas    Collection Time: 06/05/24 11:18 PM   Result Value Ref Range    Sample Type Mixed Venous Blood     Sample site Arterial Line     Drawn by angélica rt     pH, Blood gas 7.480     pCO2, Blood gas 32.0 mmHg    pO2, Blood gas 66.0 mmHg    Sodium, Blood Gas 132 mmol/L    Potassium, Blood Gas 3.8 mmol/L    Calcium Level Ionized 1.14 1.12 - 1.32 mmol/L    TOC2, Blood gas  24.8 mmol/L    Base Excess, Blood gas 1.00 mmol/L    sO2, Blood gas 94.2 %    HCO3, Blood gas 23.8 mmol/L    THb, Blood gas 14.4 g/dL    O2 Hb, Blood Gas 93.6 %    CO Hgb 1.7 %    Met Hgb 1.3 %    Allens Test N/A    RT Blood Gas    Collection Time: 06/05/24 11:19 PM   Result Value Ref Range    Sample Type Venous Blood     Drawn by kl rt     pH, Blood gas 7.430 7.300 - 7.600    pCO2, Blood gas 37.0 20.0 - 50.0 mmHg    pO2, Blood gas <38.0 mmHg    Sodium, Blood Gas 134 (L) 137 - 145 mmol/L    Potassium, Blood Gas 3.9 3.5 - 5.0 mmol/L    Calcium Level Ionized 1.15 1.12 - 1.23 mmol/L    TOC2, Blood gas 25.7 mmol/L    Base Excess, Blood gas 0.50 mmol/L    sO2, Blood gas 52.7 %    HCO3, Blood gas 24.6 mmol/L    THb, Blood gas 14.4 g/dL    O2 Hb, Blood Gas 68.7 %    CO Hgb 2.1 %    Met Hgb 1.3 %   CBC with Differential    Collection Time: 06/06/24  2:03 AM   Result Value Ref Range    WBC 13.49 (H) 4.50 - 11.50 x10(3)/mcL    RBC 4.10 (L) 4.70 - 6.10 x10(6)/mcL    Hgb 13.2 (L) 14.0 - 18.0 g/dL    Hct 38.5 (L) 42.0 - 52.0 %    MCV 93.9 80.0 - 94.0 fL    MCH 32.2 (H) 27.0 - 31.0 pg    MCHC 34.3 33.0 - 36.0 g/dL    RDW 12.7 11.5 - 17.0 %    Platelet 224 130 - 400 x10(3)/mcL    MPV 10.1 7.4 - 10.4 fL    Neut % 82.1 %    Lymph % 8.4 %    Mono % 9.0 %    Eos % 0.0 %    Basophil % 0.2 %    Lymph # 1.13 0.6 - 4.6 x10(3)/mcL    Neut # 11.07 (H) 2.1 - 9.2 x10(3)/mcL    Mono # 1.22 0.1 - 1.3 x10(3)/mcL    Eos # 0.00 0 - 0.9 x10(3)/mcL    Baso # 0.03 <=0.2 x10(3)/mcL    IG# 0.04 0 - 0.04 x10(3)/mcL    IG% 0.3 %    NRBC% 0.0 %   Magnesium    Collection Time: 06/06/24  2:03 AM   Result Value Ref Range    Magnesium Level 1.70 1.60 - 2.60 mg/dL   Phosphorus    Collection Time: 06/06/24  2:03 AM   Result Value Ref Range    Phosphorus Level 3.6 2.3 - 4.7 mg/dL   Comprehensive Metabolic Panel    Collection Time: 06/06/24  2:03 AM   Result Value Ref Range    Sodium 135 (L) 136 - 145 mmol/L    Potassium 4.2 3.5 - 5.1 mmol/L    Chloride 106 98 -  107 mmol/L    CO2 20 (L) 23 - 31 mmol/L    Glucose 122 (H) 82 - 115 mg/dL    Blood Urea Nitrogen 15.4 8.4 - 25.7 mg/dL    Creatinine 0.72 (L) 0.73 - 1.18 mg/dL    Calcium 8.3 (L) 8.8 - 10.0 mg/dL    Protein Total 5.9 5.8 - 7.6 gm/dL    Albumin 3.4 3.4 - 4.8 g/dL    Globulin 2.5 2.4 - 3.5 gm/dL    Albumin/Globulin Ratio 1.4 1.1 - 2.0 ratio    Bilirubin Total 2.3 (H) <=1.5 mg/dL    ALP 49 40 - 150 unit/L    ALT 18 0 - 55 unit/L    AST 32 5 - 34 unit/L    eGFR >60 mL/min/1.73/m2    Anion Gap 9.0 mEq/L    BUN/Creatinine Ratio 21    POCT glucose    Collection Time: 06/06/24  8:18 AM   Result Value Ref Range    POCT Glucose 98 70 - 110 mg/dL   POCT glucose    Collection Time: 06/06/24 11:56 AM   Result Value Ref Range    POCT Glucose 90 70 - 110 mg/dL   POCT glucose    Collection Time: 06/06/24 12:09 PM   Result Value Ref Range    POC Glucose 90 70 - 110 MG/DL   POCT glucose    Collection Time: 06/06/24 12:10 PM   Result Value Ref Range    POC Glucose 98 70 - 110 MG/DL   RT Blood Gas    Collection Time: 06/06/24  5:08 PM   Result Value Ref Range    Sample Type Arterial Blood     Sample site Arterial Line     Drawn by ss lrt     pH, Blood gas 7.370 7.350 - 7.450    pCO2, Blood gas 38.0 35.0 - 45.0 mmHg    pO2, Blood gas 56.0 (L) 80.0 - 100.0 mmHg    Sodium, Blood Gas 134 (L) 137 - 145 mmol/L    Potassium, Blood Gas 3.6 3.5 - 5.0 mmol/L    Calcium Level Ionized 1.12 1.12 - 1.23 mmol/L    TOC2, Blood gas 23.2 mmol/L    Base Excess, Blood gas -3.00 (L) -2.00 - 2.00 mmol/L    sO2, Blood gas 87.8 %    HCO3, Blood gas 22.0 22.0 - 26.0 mmol/L    THb, Blood gas 11.6 (L) 12 - 16 g/dL    O2 Hb, Blood Gas 89.7 (L) 94.0 - 97.0 %    CO Hgb 2.4 (H) 0.5 - 1.5 %    Met Hgb 1.0 0.4 - 1.5 %    Allens Test N/A     FIO2, Blood gas 21 %   RT Blood Gas    Collection Time: 06/06/24  5:08 PM   Result Value Ref Range    Sample Type Venous Blood     pH, Blood gas 7.370 7.300 - 7.600    pCO2, Blood gas 44.0 20.0 - 50.0 mmHg    pO2, Blood gas <38.0  mmHg    Sodium, Blood Gas 136 (L) 137 - 145 mmol/L    Potassium, Blood Gas 3.6 3.5 - 5.0 mmol/L    Calcium Level Ionized 1.13 1.12 - 1.23 mmol/L    TOC2, Blood gas 26.8 mmol/L    Base Excess, Blood gas -0.10 mmol/L    sO2, Blood gas 52.7 %    HCO3, Blood gas 25.4 mmol/L    THb, Blood gas 11.4 g/dL    O2 Hb, Blood Gas 62.9 %    CO Hgb 2.8 %    Met Hgb 0.7 %   POCT glucose    Collection Time: 06/06/24  6:06 PM   Result Value Ref Range    POC Glucose 92 70 - 110 MG/DL   Type & Screen    Collection Time: 06/06/24 10:45 PM   Result Value Ref Range    Group & Rh O NEG     Indirect Julián GEL NEG     Specimen Outdate 06/09/2024 23:59    RT Blood Gas    Collection Time: 06/07/24 12:40 AM   Result Value Ref Range    Sample Type Mixed Venous Blood     Drawn by TDL RRT     pH, Blood gas 7.370     pCO2, Blood gas 42.0 mmHg    pO2, Blood gas <38.0 mmHg    Sodium, Blood Gas 136 mmol/L    Potassium, Blood Gas 3.5 mmol/L    Calcium Level Ionized 1.17 1.12 - 1.32 mmol/L    TOC2, Blood gas 25.6 mmol/L    Base Excess, Blood gas -1.00 mmol/L    sO2, Blood gas 59.3 %    HCO3, Blood gas 24.3 mmol/L    THb, Blood gas 11.3 g/dL    O2 Hb, Blood Gas 72.6 %    CO Hgb 2.5 %    Met Hgb 0.7 %    Oxygen Device, Blood gas Cannula     LPM 3    RT Blood Gas    Collection Time: 06/07/24 12:40 AM   Result Value Ref Range    Sample Type Arterial Blood     Sample site Arterial Line     Drawn by TDL RRT     pH, Blood gas 7.430 7.350 - 7.450    pCO2, Blood gas 36.0 35.0 - 45.0 mmHg    pO2, Blood gas 99.0 80.0 - 100.0 mmHg    Sodium, Blood Gas 133 (L) 137 - 145 mmol/L    Potassium, Blood Gas 3.5 3.5 - 5.0 mmol/L    Calcium Level Ionized 1.10 (L) 1.12 - 1.23 mmol/L    TOC2, Blood gas 25.0 mmol/L    Base Excess, Blood gas -0.10 -2.00 - 2.00 mmol/L    sO2, Blood gas 97.9 %    HCO3, Blood gas 23.9 22.0 - 26.0 mmol/L    THb, Blood gas 11.5 (L) 12 - 16 g/dL    O2 Hb, Blood Gas 95.4 94.0 - 97.0 %    CO Hgb 1.9 (H) 0.5 - 1.5 %    Met Hgb 1.1 0.4 - 1.5 %    Oxygen  Device, Blood gas Cannula     LPM 3    CBC with Differential    Collection Time: 06/07/24  2:36 AM   Result Value Ref Range    WBC 10.19 4.50 - 11.50 x10(3)/mcL    RBC 3.31 (L) 4.70 - 6.10 x10(6)/mcL    Hgb 10.7 (L) 14.0 - 18.0 g/dL    Hct 31.9 (L) 42.0 - 52.0 %    MCV 96.4 (H) 80.0 - 94.0 fL    MCH 32.3 (H) 27.0 - 31.0 pg    MCHC 33.5 33.0 - 36.0 g/dL    RDW 12.8 11.5 - 17.0 %    Platelet 165 130 - 400 x10(3)/mcL    MPV 10.1 7.4 - 10.4 fL    Neut % 73.7 %    Lymph % 13.5 %    Mono % 11.9 %    Eos % 0.4 %    Basophil % 0.2 %    Lymph # 1.38 0.6 - 4.6 x10(3)/mcL    Neut # 7.51 2.1 - 9.2 x10(3)/mcL    Mono # 1.21 0.1 - 1.3 x10(3)/mcL    Eos # 0.04 0 - 0.9 x10(3)/mcL    Baso # 0.02 <=0.2 x10(3)/mcL    IG# 0.03 0 - 0.04 x10(3)/mcL    IG% 0.3 %    NRBC% 0.0 %   Comprehensive Metabolic Panel    Collection Time: 06/07/24  2:37 AM   Result Value Ref Range    Sodium 136 136 - 145 mmol/L    Potassium 3.7 3.5 - 5.1 mmol/L    Chloride 108 (H) 98 - 107 mmol/L    CO2 21 (L) 23 - 31 mmol/L    Glucose 102 82 - 115 mg/dL    Blood Urea Nitrogen 11.7 8.4 - 25.7 mg/dL    Creatinine 0.68 (L) 0.73 - 1.18 mg/dL    Calcium 7.8 (L) 8.8 - 10.0 mg/dL    Protein Total 5.3 (L) 5.8 - 7.6 gm/dL    Albumin 3.3 (L) 3.4 - 4.8 g/dL    Globulin 2.0 (L) 2.4 - 3.5 gm/dL    Albumin/Globulin Ratio 1.7 1.1 - 2.0 ratio    Bilirubin Total 2.2 (H) <=1.5 mg/dL    ALP 45 40 - 150 unit/L    ALT 13 0 - 55 unit/L    AST 23 5 - 34 unit/L    eGFR >60 mL/min/1.73/m2    Anion Gap 7.0 mEq/L    BUN/Creatinine Ratio 17    Magnesium    Collection Time: 06/07/24  2:37 AM   Result Value Ref Range    Magnesium Level 1.90 1.60 - 2.60 mg/dL   MRSA PCR    Collection Time: 06/07/24  5:22 AM   Result Value Ref Range    MRSA PCR Screen Not Detected Not Detected   POCT glucose    Collection Time: 06/07/24  7:55 AM   Result Value Ref Range    POCT Glucose 97 70 - 110 mg/dL   EKG 12-lead    Collection Time: 06/07/24  8:06 AM   Result Value Ref Range    QRS Duration 86 ms    [facility]HS QTC  Calculation 445 ms   RT Blood Gas    Collection Time: 06/07/24  9:45 AM   Result Value Ref Range    Sample Type Venous Blood     Drawn by BD RN     pH, Blood gas 7.380 7.300 - 7.600    pCO2, Blood gas 43.0 20.0 - 50.0 mmHg    pO2, Blood gas <38.0 mmHg    Sodium, Blood Gas 133 (L) 137 - 145 mmol/L    Potassium, Blood Gas 3.9 3.5 - 5.0 mmol/L    Calcium Level Ionized 1.14 1.12 - 1.23 mmol/L    TOC2, Blood gas 26.7 mmol/L    Base Excess, Blood gas 0.10 mmol/L    sO2, Blood gas 67.6 %    HCO3, Blood gas 25.4 mmol/L    THb, Blood gas 10.2 g/dL    O2 Hb, Blood Gas 69.7 %    CO Hgb 2.1 %    Met Hgb 1.2 %    Allens Test N/A    RT Blood Gas    Collection Time: 06/07/24  9:46 AM   Result Value Ref Range    Sample Type Arterial Blood     Sample site Arterial Line     Drawn by sd rrt     pH, Blood gas 7.390 7.350 - 7.450    pCO2, Blood gas 37.0 35.0 - 45.0 mmHg    pO2, Blood gas 143.0 (H) 80.0 - 100.0 mmHg    Sodium, Blood Gas 134 (L) 137 - 145 mmol/L    Potassium, Blood Gas 3.9 3.5 - 5.0 mmol/L    Calcium Level Ionized 1.12 1.12 - 1.23 mmol/L    TOC2, Blood gas 23.5 mmol/L    Base Excess, Blood gas -2.20 (L) -2.00 - 2.00 mmol/L    sO2, Blood gas 99.2 %    HCO3, Blood gas 22.4 22.0 - 26.0 mmol/L    THb, Blood gas 10.2 (L) 12 - 16 g/dL    O2 Hb, Blood Gas 96.2 94.0 - 97.0 %    CO Hgb 1.5 0.5 - 1.5 %    Met Hgb 1.2 0.4 - 1.5 %    Allens Test N/A     Oxygen Device, Blood gas Cannula     LPM 3    Comprehensive Metabolic Panel    Collection Time: 06/08/24  1:43 AM   Result Value Ref Range    Sodium 137 136 - 145 mmol/L    Potassium 4.3 3.5 - 5.1 mmol/L    Chloride 111 (H) 98 - 107 mmol/L    CO2 20 (L) 23 - 31 mmol/L    Glucose 107 82 - 115 mg/dL    Blood Urea Nitrogen 18.6 8.4 - 25.7 mg/dL    Creatinine 0.79 0.73 - 1.18 mg/dL    Calcium 7.9 (L) 8.8 - 10.0 mg/dL    Protein Total 5.0 (L) 5.8 - 7.6 gm/dL    Albumin 2.9 (L) 3.4 - 4.8 g/dL    Globulin 2.1 (L) 2.4 - 3.5 gm/dL    Albumin/Globulin Ratio 1.4 1.1 - 2.0 ratio    Bilirubin  Total 1.5 <=1.5 mg/dL    ALP 42 40 - 150 unit/L    ALT 16 0 - 55 unit/L    AST 24 5 - 34 unit/L    eGFR >60 mL/min/1.73/m2    Anion Gap 6.0 mEq/L    BUN/Creatinine Ratio 24    Magnesium    Collection Time: 06/08/24  1:43 AM   Result Value Ref Range    Magnesium Level 2.00 1.60 - 2.60 mg/dL   CBC with Differential    Collection Time: 06/08/24  1:43 AM   Result Value Ref Range    WBC 9.12 4.50 - 11.50 x10(3)/mcL    RBC 2.95 (L) 4.70 - 6.10 x10(6)/mcL    Hgb 9.3 (L) 14.0 - 18.0 g/dL    Hct 28.2 (L) 42.0 - 52.0 %    MCV 95.6 (H) 80.0 - 94.0 fL    MCH 31.5 (H) 27.0 - 31.0 pg    MCHC 33.0 33.0 - 36.0 g/dL    RDW 12.7 11.5 - 17.0 %    Platelet 154 130 - 400 x10(3)/mcL    MPV 10.4 7.4 - 10.4 fL    Neut % 67.8 %    Lymph % 18.3 %    Mono % 12.1 %    Eos % 1.2 %    Basophil % 0.2 %    Lymph # 1.67 0.6 - 4.6 x10(3)/mcL    Neut # 6.18 2.1 - 9.2 x10(3)/mcL    Mono # 1.10 0.1 - 1.3 x10(3)/mcL    Eos # 0.11 0 - 0.9 x10(3)/mcL    Baso # 0.02 <=0.2 x10(3)/mcL    IG# 0.04 0 - 0.04 x10(3)/mcL    IG% 0.4 %    NRBC% 0.0 %   Hemoglobin and Hematocrit    Collection Time: 06/08/24  9:21 AM   Result Value Ref Range    Hgb 9.6 (L) 14.0 - 18.0 g/dL    Hct 29.6 (L) 42.0 - 52.0 %     EKG:      Telemetry:  Sinus Rhythm    Physical Exam  Vitals and nursing note reviewed.   Constitutional:       General: He is not in acute distress.     Appearance: Normal appearance. He is obese. He is not ill-appearing.   HENT:      Head: Normocephalic.      Mouth/Throat:      Mouth: Mucous membranes are moist.      Pharynx: Oropharynx is clear.   Cardiovascular:      Rate and Rhythm: Normal rate and regular rhythm.      Pulses:           Dorsalis pedis pulses are 1+ on the right side and 1+ on the left side.   Pulmonary:      Effort: No respiratory distress.      Breath sounds: Normal breath sounds. No wheezing or rales.      Comments: NC  Abdominal:      General: There is no distension.      Palpations: Abdomen is soft.      Tenderness: There is no abdominal  tenderness. There is no guarding.   Genitourinary:     Comments: Brannon Catheter   Musculoskeletal:         General: Normal range of motion.      Cervical back: Neck supple.      Right lower leg: No edema.      Left lower leg: No edema.      Comments: BLE Warm   Skin:     Comments: Bilateral Groins soft with no evidence of hematoma noted.  Left Axillary Impella Site C/D/I.   Neurological:      General: No focal deficit present.      Mental Status: He is alert and oriented to person, place, and time. Mental status is at baseline.   Psychiatric:         Mood and Affect: Mood normal.         Behavior: Behavior normal.         Judgment: Judgment normal.       Current Inpatient Medications:    Current Facility-Administered Medications:     0.9%  NaCl infusion (for blood administration), , Intravenous, Q24H PRN, Lon Zuleta MD    acetaminophen tablet 650 mg, 650 mg, Oral, Q4H PRN, Almas Vaz PA-C, 650 mg at 06/07/24 1432    aspirin EC tablet 81 mg, 81 mg, Oral, Daily, Brant Flores MD, 81 mg at 06/08/24 0812    carvediloL tablet 6.25 mg, 6.25 mg, Oral, BID, Brant Flores MD, 6.25 mg at 06/08/24 0811    cefazolin (ANCEF) 2 gram in dextrose 5% 50 mL IVPB (premix), 2 g, Intravenous, On Call Procedure, Isabela Wilkes, CELESTE    clopidogreL tablet 75 mg, 75 mg, Oral, Daily, Brant Flores MD, 75 mg at 06/08/24 0810    cyclobenzaprine tablet 10 mg, 10 mg, Oral, TID PRN, Inés Corona DO, 10 mg at 06/08/24 1046    diclofenac sodium 1 % gel 2 g, 2 g, Topical (Top), Daily PRN, Inés Corona DO, 2 g at 06/05/24 2002    gabapentin capsule 100 mg, 100 mg, Oral, BID, Brant Flores MD, 100 mg at 06/08/24 0811    heparin 25,000 units in dextrose 5% 250 mL (100 units/mL) infusion (heparin infusion - NO NOMOGRAM), 3 Units/kg/hr (Adjusted), Intravenous, Continuous, Almas Vaz PA-C    hydrALAZINE injection 10 mg, 10 mg, Intravenous, Q4H PRN, Brant Flores MD, 10 mg at 06/06/24 1910    isosorbide  mononitrate 24 hr tablet 30 mg, 30 mg, Oral, Daily, Hernan, Rein T, FNP, 30 mg at 06/08/24 0810    ketorolac injection 30 mg, 30 mg, Intravenous, Q6H, Gordy Brennan PA, 30 mg at 06/07/24 1837    magnesium oxide tablet 400 mg, 400 mg, Oral, BID, Hernan, Rein T, FNP, 400 mg at 06/08/24 0811    morphine injection 2 mg, 2 mg, Intravenous, Q4H PRN, Robert Nash MD, 2 mg at 06/06/24 1823    mupirocin 2 % ointment, , Nasal, On Call Procedure, Isabela Wilkes FNP    ondansetron disintegrating tablet 8 mg, 8 mg, Oral, Q8H PRN, Almas Vaz PA-C    oxyCODONE-acetaminophen 5-325 mg per tablet 1 tablet, 1 tablet, Oral, Q4H PRN, Gordy Brennan PA, 1 tablet at 06/07/24 1519    pantoprazole EC tablet 40 mg, 40 mg, Oral, Daily, Hernan, Rein T, FNP, 40 mg at 06/08/24 0810    ranolazine 12 hr tablet 500 mg, 500 mg, Oral, BID, Hernan, Rein T, FNP, 500 mg at 06/08/24 0811    sacubitriL-valsartan 24-26 mg per tablet 1 tablet, 1 tablet, Oral, BID, Hernan, Rein T, FNP, 1 tablet at 06/08/24 0810    sodium bicarbonate 25 mEq in dextrose 5 % (D5W) 1,000 mL infusion, , Intravenous, Continuous, Lon Zuleta MD    Facility-Administered Medications Ordered in Other Encounters:     diazePAM tablet 10 mg, 10 mg, Oral, On Call Procedure, Constantin Martino MD, 10 mg at 04/19/24 0729    diphenhydrAMINE capsule 50 mg, 50 mg, Oral, On Call Procedure, Constantin Martino MD, 50 mg at 07/29/22 0844    diphenhydrAMINE capsule 50 mg, 50 mg, Oral, On Call Procedure, Constantin Martino MD, 50 mg at 04/19/24 0730    sodium chloride 0.9% flush 10 mL, 10 mL, Intravenous, PRN, Constantin Martino MD    sodium chloride 0.9% flush 10 mL, 10 mL, Intravenous, PRN, Constantin Martino MD  VTE Risk Mitigation (From admission, onward)           Ordered     IP VTE HIGH RISK PATIENT  Once         06/06/24 2159     heparin 25,000 units in dextrose 5% 250 mL (100 units/mL) infusion (heparin infusion - NO NOMOGRAM)  Continuous         06/05/24 0191                   Assessment:   CAD/CABG (LIMA to LAD, LRA to OM, SVG to Diagonal [place], 1999)    - Status Post LM into LAD PCI/Stenting with Left Axillary Impella Assist (6.6.24) (Dr. Nettles)    - Status Post MV PCI: PCI Ostial Radial Graft to OM (KALYANI), Sock Wave Lithotripsy of LM & Proximal LAD, Laser Atherectomy of LIMA to LAD & PTCA for ISR, PTCA Distal LAD, Failed Attempt at Retrograde  PCI of RCA with 5.5 L Impella Support (6.5.24) (Dr. Flores)  ICMO EF 30%    - Status Post Axillary Impella Placement and Removal (Re: MV PCI)   Hypertension (BP Stable)  Hyperlipidemia  HERMINIA (Severe)  Elevated BMI/Obesity  Anemia - Worsening   Leukocytosis (Resolved)     - Afebrile   Peripheral Neuropathy  History of CVA    Plan:   Start Atorvastatin 40 mg oral Daily   Monitor H/H; Currently no sign of active bleeding noted   Continue DAPT (Aspirin 81 Mg Daily & Plavix 75 Mg Daily)  Optimize Guideline Directed Medical Therapy for ICMO: Coreg 6.25 Mg PO BID and Entresto 24/26 Mg PO BID in AM (Hold Parameters)-  Consider Starting SGLT2 Inhibitor prior to discharge  Continue Protonix for Gastric Protection  Mobilize as able   Labs in AM: CBC, CMP, and Mag     CELESTE Penaloza  Cardiology  [facility] - [facility]  06/08/2024

## 2024-06-08 NOTE — NURSING
Nurses Note -- 4 Eyes      6/8/2024   2:22 AM      Skin assessed during: Daily Assessment      [x] No Altered Skin Integrity Present    [x]Prevention Measures Documented      [] Yes- Altered Skin Integrity Present or Discovered   [] LDA Added if Not in Epic (Describe Wound)   [] New Altered Skin Integrity was Present on Admit and Documented in LDA   [] Wound Image Taken    Wound Care Consulted? No    Attending Nurse:  SHARLA Johns    Second RN/Staff Member:  SHARLA Mary

## 2024-06-08 NOTE — PROGRESS NOTES
[facility] - [facility]  Pulmonary Critical Care Note      Patient Name: Carlos Waller  MRN: [id number]  Admission Date: 6/5/2024  Hospital Length of Stay: 3 days  Code Status: Full Code  Attending Provider: Brant Flores MD  Primary Care Provider: Louis Monahan Jr., MD     Subjective:     HPI: Carlos Waller is a 68-year-old male with past medical history of CAD status post CABG x3 in 1999, hypertension, hyperlipidemia, arthritis, obesity was admitted to ICU after Impella placement for high-risk PCI today on  06/05/2024. Patient was extubated the PACU and is on 10 L OxyMask satting well at this time.  On my examination, patient is alert and responding appropriately.  Bleeding noted on the left axillary Impella site.     Hospital Course/Significant events:    Impella placement on 06/05/2024  Coronary angiogram with PCI of LM into LAD on 06/06/2024  Impella removal on 06/07/2024    24 Hour Interval History:   No acute events overnight.  Patient had some old blood expressed from Impella site, possibly from older hematoma.  No evidence of new active bleeding.  Hemoglobin 9.3 down from 10.7 yesterday.  No transfusion given, was fluid resuscitated and blood pressure responded appropriately.  Patient states he is comfortable this morning, no shortness a breath or other new symptoms.  Hemodynamically stable, now on DAPT and GDMT.    Past Medical History:   Diagnosis Date    Anemia, unspecified     Arthritis     Chest pain     Coronary artery disease     Coronary artery disease involving coronary bypass graft of native heart, unspecified whether angina present     Hyperlipidemia     Hypertension     Obesity     Right shoulder pain     Sleep apnea     Stroke     left side numbness     Past Surgical History:   Procedure Laterality Date    CARDIAC CATHETERIZATION  2016    COLONOSCOPY W/ POLYPECTOMY      CORONARY ANGIOGRAPHY INCLUDING BYPASS GRAFTS WITH CATHETERIZATION OF LEFT HEART Left 07/29/2022     Procedure: ANGIOGRAM, CORONARY, INCLUDING BYPASS GRAFT, WITH LEFT HEART CATHETERIZATION;  Surgeon: Constantin Martino MD;  Location: [facility] CATH LAB;  Service: Cardiology;  Laterality: Left;    CORONARY ARTERY BYPASS GRAFT  1999    CORONARY BYPASS GRAFT ANGIOGRAPHY  04/19/2024    Procedure: Bypass graft study;  Surgeon: Constantin Martino MD;  Location: [facility] CATH LAB;  Service: Cardiology;;    CORONARY STENT PLACEMENT      x2    HERNIA REPAIR      failed    INTRAVASCULAR ULTRASOUND, CORONARY N/A 6/6/2024    Procedure: Ultrasound-coronary;  Surgeon: Almas Nettles Jr., MD;  Location: [facility] CATH LAB;  Service: Cardiology;  Laterality: N/A;    LEFT HEART CATHETERIZATION Left 04/19/2024    Procedure: Left heart cath;  Surgeon: Constantin Martino MD;  Location: [facility] CATH LAB;  Service: Cardiology;  Laterality: Left;  LHC +/- PCI VIA RT FEMORAL ARTERY    LEFT HEART CATHETERIZATION N/A 6/6/2024    Procedure: Left heart cath;  Surgeon: Almas Nettles Jr., MD;  Location: [facility] CATH LAB;  Service: Cardiology;  Laterality: N/A;    LITHOTRIPSY, CORONARY TRANSLUMINAL, PERCUTANEOUS  6/6/2024    Procedure: LITHOTRIPSY, CORONARY TRANSLUMINAL, PERCUTANEOUS;  Surgeon: Almas Nettles Jr., MD;  Location: [facility] CATH LAB;  Service: Cardiology;;    PERCUTANEOUS TRANSLUMINAL BALLOON ANGIOPLASTY OF BYPASS GRAFT N/A 07/29/2022    Procedure: PTA, BYPASS GRAFT, USING BALLOON;  Surgeon: Constantin Martino MD;  Location: [facility] CATH LAB;  Service: Cardiology;  Laterality: N/A;    STENT, DRUG ELUTING, SINGLE VESSEL, CORONARY  6/6/2024    Procedure: Stent, Drug Eluting, Single Vessel, Coronary;  Surgeon: Almas Nettles Jr., MD;  Location: [facility] CATH LAB;  Service: Cardiology;;     Review of patient's allergies indicates:   Allergen Reactions    Codeine Itching     Current Outpatient Medications   Medication Instructions    aspirin (ECOTRIN) 81 mg, Oral, Daily    carvediloL (COREG) 6.25 mg, Oral, 2 times daily    clopidogreL (PLAVIX) 75 mg, Oral, Daily     cyclobenzaprine (FLEXERIL) 10 mg, Oral, 3 times daily PRN    gabapentin (NEURONTIN) 100 mg, Oral, 2 times daily    isosorbide mononitrate (IMDUR) 30 mg, Oral, Daily    levocetirizine (XYZAL) 5 mg, Oral, Daily PRN    lisinopriL (PRINIVIL,ZESTRIL) 20 mg, Oral, Daily    meloxicam (MOBIC) 7.5 mg, Oral, Daily PRN    nitroGLYCERIN (NITROSTAT) 0.4 mg, Sublingual, Every 5 min PRN    omeprazole (PRILOSEC) 40 mg, Oral, Daily    ranolazine (RANEXA) 500 mg, Oral, 2 times daily    rosuvastatin (CRESTOR) 20 mg, Oral, Daily    Social History:   Social History     Socioeconomic History    Marital status: Single   Tobacco Use    Smoking status: Never    Smokeless tobacco: Never   Substance and Sexual Activity    Alcohol use: Yes     Alcohol/week: 10.0 standard drinks of alcohol     Types: 10 Cans of beer per week    Drug use: Not Currently     Social Determinants of Health     Financial Resource Strain: Medium Risk (6/6/2024)    Overall Financial Resource Strain (CARDIA)     Difficulty of Paying Living Expenses: Somewhat hard   Food Insecurity: No Food Insecurity (6/6/2024)    Hunger Vital Sign     Worried About Running Out of Food in the Last Year: Never true     Ran Out of Food in the Last Year: Never true   Transportation Needs: No Transportation Needs (6/6/2024)    TRANSPORTATION NEEDS     Transportation : No   Physical Activity: Unknown (6/6/2024)    Exercise Vital Sign     Days of Exercise per Week: 0 days   Stress: Stress Concern Present (6/6/2024)    [employer] [place] [employer] - Occupational Stress Questionnaire     Feeling of Stress : Rather much   Housing Stability: Low Risk  (6/6/2024)    Housing Stability Vital Sign     Unable to Pay for Housing in the Last Year: No     Homeless in the Last Year: No     Family History   Problem Relation Name Age of Onset    Stroke Mother      Cancer Father       Review of Systems   Constitutional:  Negative for chills and fever.   Respiratory:  Negative for cough and  shortness of breath.    Cardiovascular:  Negative for chest pain and leg swelling.   Gastrointestinal:  Negative for abdominal pain and nausea.   Genitourinary:  Negative for dysuria.   Neurological:  Positive for tingling.      Objective:     Vital Signs (Most Recent):  Temp: 98.5 °F (36.9 °C) (06/08/24 0400)  Pulse: 74 (06/08/24 0600)  Resp: 20 (06/08/24 0600)  BP: (!) 120/59 (06/08/24 0600)  SpO2: 97 % (06/08/24 0600)  Body mass index is 38.43 kg/m².  Weight: 121.5 kg (267 lb 13.7 oz) Vital Signs (24h Range):  Temp:  [98.3 °F (36.8 °C)-99.1 °F (37.3 °C)] 98.5 °F (36.9 °C)  Pulse:  [58-85] 74  Resp:  [6-32] 20  SpO2:  [90 %-100 %] 97 %  BP: ()/(40-66) 120/59  Arterial Line BP: ()/() 156/154     Input/output::     Intake/Output Summary (Last 24 hours) at 6/8/2024 0710  Last data filed at 6/8/2024 0419  Gross per 24 hour   Intake 996.24 ml   Output 365 ml   Net 631.24 ml     Physical Exam:   GEN:  Alert, oriented x3   CARDIO: regular rate, regular rhythm, normal S1, S2, no murmurs, rubs, clicks or gallops   PULM/CHEST: clear to auscultation bilaterally, no wheezes, rales or rhonchi, symmetric air entry, no accessory muscle use or distress.  OxyMask in place  ABDOMEN: + BS, soft, non tender, non-distended, no guarding and no rebound. no masses or organomegaly   DERM: No rashes or lesions   EXT: peripheral pulses normal, no clubbing or cyanosis. No BLE edema.   NEURO: AAOx3, responding appropriately    Significant Labs:    Laboratory Studies:   Recent Labs   Lab 06/07/24  0946   PH 7.390   PCO2 37.0   PO2 143.0*   HCO3 22.4     Recent Labs   Lab 06/08/24  0143   WBC 9.12   RBC 2.95*   HGB 9.3*   HCT 28.2*      MCV 95.6*   MCH 31.5*   MCHC 33.0     Recent Labs   Lab 06/08/24  0143   GLUCOSE 107      K 4.3   CO2 20*   BUN 18.6   CREATININE 0.79   CALCIUM 7.9*   MG 2.00     ABGs:  Recent Labs   Lab 06/07/24  0946   PH 7.390   PO2 143.0*   PCO2 37.0   HCO3 22.4   POCBASEDEF -2.20*      Lines/Drains/Airways       Drain  Duration                  Urethral Catheter 06/05/24 0728 Temperature probe 16 Fr. <1 day              Arterial Line  Duration                  Sheath 06/05/24 1006 Right anterior;proximal <1 day    Arterial Line 06/05/24 0649 Right Radial <1 day                   Current Medications:     Infusions:   heparin (porcine) in 5 % dex  3 Units/kg/hr (Adjusted) Intravenous Continuous        sodium bicarbonate 25 mEq in dextrose 5 % (D5W) 1,000 mL infusion   Intravenous Continuous             Scheduled:   aspirin  81 mg Oral Daily    atropine        carvediloL  6.25 mg Oral BID    clopidogreL  75 mg Oral Daily    gabapentin  100 mg Oral BID    isosorbide mononitrate  30 mg Oral Daily    ketorolac  30 mg Intravenous Q6H    magnesium oxide  400 mg Oral BID    pantoprazole  40 mg Oral Daily    ranolazine  500 mg Oral BID    sacubitriL-valsartan  1 tablet Oral BID         PRN:   aspirin EC tablet 81 mg    atropine 0.1 mg/mL injection    carvediloL tablet 6.25 mg    clopidogreL tablet 75 mg    gabapentin capsule 100 mg    isosorbide mononitrate 24 hr tablet 30 mg    ketorolac injection 30 mg    magnesium oxide tablet 400 mg    pantoprazole EC tablet 40 mg    ranolazine 12 hr tablet 500 mg    sacubitriL-valsartan 24-26 mg per tablet 1 tablet    Microbiology Data:  Microbiology Results (last 7 days)       Procedure Component Value Units Date/Time    MRSA Screen by PCR [[id number]] Collected: 06/07/24 0522    Order Status: Canceled Specimen: Nasal Swab Updated: 06/07/24 0526         Antibiotics and Day Number of Therapy:  Antibiotics (From admission, onward)      Start     Stop Route Frequency Ordered    06/06/24 2151  mupirocin 2 % ointment         -- Nasl On Call Procedure 06/06/24 2151 06/06/24 2151  cefazolin (ANCEF) 2 gram in dextrose 5% 50 mL IVPB (premix)         -- IV On Call Procedure 06/06/24 2151             Estimated Creatinine Clearance: 115.3 mL/min (based on SCr of 0.79  mg/dL).    Imaging:  Cardiac catheterization    The estimated blood loss was none.    The procedure log was documented by Documenter: Lali Martinez RN and   verified by Brant Flores MD.    Date: 6/5/2024  Time: 2:23 PM    Preprocedure diagnosis-abnormal stress test, surgical turndown, Class 3   angina, chronic systolic heart failure  Postprocedure diagnosis-Obstructive CAD  Estimated blood loss-40 cc  Tissue removal-none  Complications-none    Procedures performed:  1. Ultrasound-guided bilateral groin access  2. Coronary, SVG, LIMA angiography  3. Failed attempt at retrograde  PCI of RCA (unable to cannulate RCA   with guide while 5.5 impella was in place)  4. Shockwave lithotripsy of LM and proximal LAD with 3.5 balloon  5. IVUS of LM, Radial graft to OM  6. PCI of ostial radial graft with 4x16 synergy KALYANI  7. Laser atherectomy of LIMA to LAD, PTCA for ISR with 2.5 NC balloon at   20 yesenia.  8. PTCA native distal LAD with 2.0 balloon.  9. Intracoronary nitro and adenosine administration to distal LAD  10. Perclose Right CFA, angioseal Left CFA  11. RHC    Findings:  1. Left main-80% calcified stenosis reduced to 60%   2. LAD-MID   3. LCX-  4. RCA-, Left to right collaterals  5. Radial graft to OM 90% reduced to 0% post PCI  6. LIMA to LAD-In stent  reduced to 10% post laser, PTA.  LAD is a   bifid system distally  7. Pa sat pre pci 75, post 70  8. PAP 33/11 pre, 33/16 post    Procedure Summary :  Jr 4 guide was used for radial graft PCI. IVUs performed     Failed to engage the ostium of the RCA with multiple guides including AL1,   AR2, MP1, ROSIE, 3DRC, JR4.  Could not tell if ostium was occluded, but   torquing catheters was difficult secondary to placed impella 5.5 device.    I needed to perform Shockwave and PTA of the LM in order to attempt   retrograde RCA  PCI.  I was able to get a wire retrograde via S1 to PDA   but unable to deliver a catheter to the ostium of the RCA.  RCA  PCI  "  was then aborted.    IM guide was used for the LIMA.  A corsair catheter and mongo wire were   used to cross the occluded stent.  Laser was performed with an 0.9   catheter.  PTA with a 2.5 NC balloon at 20 yesenia in the stent, PTA of the   distal LAD with a 2.0 balloon.  Final angio was sluggish and vessel was   small.           Plan:  1. ASA and plavix today  2. Impella site management per CTS  3. Keep swan in place while impella still inserted.  4.  If impella is staying in until Friday would recommend PCI of the LM to   LAD in order to make future attempts at  PCI of the RCA and LAD easier.        2D ECHO Results  Results for orders placed during the hospital encounter of 04/19/24    Echo    Interpretation Summary    Left Ventricle: Regional wall motion abnormalities present. The apical lateral, anterior and inferior walls are all hypokinetic. Basal and mid walls all augment WNL. There is moderately reduced systolic function with a visually estimated ejection fraction of 35 - 40%.    Tricuspid Valve: There is mild regurgitation.      Pulmonary Functions Testing Results:    No results found for: "FEV1", "FVC", "JXX9SFG", "TLC", "DLCO"      Assessment/Plan:     Assessment:  Severe triple-vessel CAD   - Status Post LM into LAD PCI/Stenting with Left Axillary Impella Assist (6.6.24)     - Status Post MV PCI: PCI Ostial Radial Graft to OM (KALYANI), Sock Wave Lithotripsy of LM & Proximal LAD, Laser Atherectomy of LIMA to LAD & PTCA for ISR, PTCA Distal LAD (6.5.24)  Impella removal on 06/07/2024  History of CABG x3 in 1999  Impella site bleeding  Ischemic cardiomyopathy, (EF 30%)  Obesity  Hypertension  Hyperlipidemia  Sleep apnea  Hx of CVA    Plan:  -continue ICU monitoring for now   -oxygenating well on 3 L, can deescalate to room air as tolerated  -tolerated Impella removal well yesterday, blood pressure at goal today   -remains on DAPT, initiating GDMT as pressure tolerates  -cardiology and CT surgery continuing " to follow  -can begin mobilizing more now that Impella has been removed   -no evidence of active bleeding, continue to monitor hemoglobin and hematocrit.  -likely okay to downgrade to floor bed later today    DVT Prophylaxis:  SCDs  GI prophylaxis:  Protonix    32 minutes of critical care was time spent personally by me on the following activities: development of treatment plan with patient or surrogate and bedside caregivers, discussions with consultants, evaluation of patient's response to treatment, examination of patient, ordering and performing treatments and interventions, ordering and review of laboratory studies, ordering and review of radiographic studies, pulse oximetry, re-evaluation of patient's condition.  This critical care time did not overlap with that of any other provider or involve time for any procedures.     Campos Ndiaye DO  Pulmonary/Critical Care  [facility] - [facility] ICU

## 2024-06-08 NOTE — NURSING
Nurses Note -- 4 Eyes      6/8/2024   8:36 AM      Skin assessed during: Daily Assessment      [x] No Altered Skin Integrity Present    [x]Prevention Measures Documented      [] Yes- Altered Skin Integrity Present or Discovered   [] LDA Added if Not in Epic (Describe Wound)   [] New Altered Skin Integrity was Present on Admit and Documented in LDA   [] Wound Image Taken    Wound Care Consulted? No    Attending Nurse:  SHARLA Shearer    Second RN/Staff Member:  SHARLA Pruitt

## 2024-06-08 NOTE — PLAN OF CARE
Problem: Adult Inpatient Plan of Care  Goal: Plan of Care Review  Outcome: Adequate for Care Transition  Goal: Patient-Specific Goal (Individualized)  Outcome: Adequate for Care Transition  Goal: Absence of Hospital-Acquired Illness or Injury  Outcome: Adequate for Care Transition  Goal: Optimal Comfort and Wellbeing  Outcome: Adequate for Care Transition  Goal: Readiness for Transition of Care  Outcome: Adequate for Care Transition     Problem: Infection  Goal: Absence of Infection Signs and Symptoms  Outcome: Adequate for Care Transition     Problem: Wound  Goal: Optimal Coping  Outcome: Adequate for Care Transition  Goal: Optimal Functional Ability  Outcome: Adequate for Care Transition  Goal: Absence of Infection Signs and Symptoms  Outcome: Adequate for Care Transition  Goal: Improved Oral Intake  Outcome: Adequate for Care Transition  Goal: Optimal Pain Control and Function  Outcome: Adequate for Care Transition  Goal: Skin Health and Integrity  Outcome: Adequate for Care Transition  Goal: Optimal Wound Healing  Outcome: Adequate for Care Transition     Problem: Skin Injury Risk Increased  Goal: Skin Health and Integrity  Outcome: Adequate for Care Transition

## 2024-06-09 LAB
ABO + RH BLD: NORMAL
ALBUMIN SERPL-MCNC: 2.9 G/DL (ref 3.4–4.8)
ALBUMIN/GLOB SERPL: 1.3 RATIO (ref 1.1–2)
ALP SERPL-CCNC: 44 UNIT/L (ref 40–150)
ALT SERPL-CCNC: 19 UNIT/L (ref 0–55)
ANION GAP SERPL CALC-SCNC: 8 MEQ/L
AST SERPL-CCNC: 25 UNIT/L (ref 5–34)
BASOPHILS # BLD AUTO: 0.02 X10(3)/MCL
BASOPHILS NFR BLD AUTO: 0.3 %
BILIRUB SERPL-MCNC: 1.4 MG/DL
BLD PROD TYP BPU: NORMAL
BLOOD UNIT EXPIRATION DATE: NORMAL
BLOOD UNIT TYPE CODE: 9500
BUN SERPL-MCNC: 16.9 MG/DL (ref 8.4–25.7)
CALCIUM SERPL-MCNC: 8.2 MG/DL (ref 8.8–10)
CHLORIDE SERPL-SCNC: 108 MMOL/L (ref 98–107)
CO2 SERPL-SCNC: 23 MMOL/L (ref 23–31)
CREAT SERPL-MCNC: 0.74 MG/DL (ref 0.73–1.18)
CREAT/UREA NIT SERPL: 23
CROSSMATCH INTERPRETATION: NORMAL
DISPENSE STATUS: NORMAL
EOSINOPHIL # BLD AUTO: 0.16 X10(3)/MCL (ref 0–0.9)
EOSINOPHIL NFR BLD AUTO: 2.1 %
ERYTHROCYTE [DISTWIDTH] IN BLOOD BY AUTOMATED COUNT: 12.7 % (ref 11.5–17)
GFR SERPLBLD CREATININE-BSD FMLA CKD-EPI: >60 ML/MIN/1.73/M2
GLOBULIN SER-MCNC: 2.3 GM/DL (ref 2.4–3.5)
GLUCOSE SERPL-MCNC: 98 MG/DL (ref 82–115)
HCT VFR BLD AUTO: 28.2 % (ref 42–52)
HGB BLD-MCNC: 9.4 G/DL (ref 14–18)
IMM GRANULOCYTES # BLD AUTO: 0.03 X10(3)/MCL (ref 0–0.04)
IMM GRANULOCYTES NFR BLD AUTO: 0.4 %
LYMPHOCYTES # BLD AUTO: 1.82 X10(3)/MCL (ref 0.6–4.6)
LYMPHOCYTES NFR BLD AUTO: 24.3 %
MAGNESIUM SERPL-MCNC: 1.9 MG/DL (ref 1.6–2.6)
MCH RBC QN AUTO: 31.9 PG (ref 27–31)
MCHC RBC AUTO-ENTMCNC: 33.3 G/DL (ref 33–36)
MCV RBC AUTO: 95.6 FL (ref 80–94)
MONOCYTES # BLD AUTO: 0.84 X10(3)/MCL (ref 0.1–1.3)
MONOCYTES NFR BLD AUTO: 11.2 %
NEUTROPHILS # BLD AUTO: 4.61 X10(3)/MCL (ref 2.1–9.2)
NEUTROPHILS NFR BLD AUTO: 61.7 %
NRBC BLD AUTO-RTO: 0 %
PLATELET # BLD AUTO: 197 X10(3)/MCL (ref 130–400)
PMV BLD AUTO: 10.6 FL (ref 7.4–10.4)
POCT GLUCOSE: 110 MG/DL (ref 70–110)
POTASSIUM SERPL-SCNC: 3.6 MMOL/L (ref 3.5–5.1)
PROT SERPL-MCNC: 5.2 GM/DL (ref 5.8–7.6)
RBC # BLD AUTO: 2.95 X10(6)/MCL (ref 4.7–6.1)
SODIUM SERPL-SCNC: 139 MMOL/L (ref 136–145)
UNIT NUMBER: NORMAL
WBC # SPEC AUTO: 7.48 X10(3)/MCL (ref 4.5–11.5)

## 2024-06-09 PROCEDURE — 99024 POSTOP FOLLOW-UP VISIT: CPT | Mod: ,,, | Performed by: PHYSICIAN ASSISTANT

## 2024-06-09 PROCEDURE — 25000003 PHARM REV CODE 250

## 2024-06-09 PROCEDURE — 25000003 PHARM REV CODE 250: Performed by: PHYSICIAN ASSISTANT

## 2024-06-09 PROCEDURE — 80053 COMPREHEN METABOLIC PANEL: CPT

## 2024-06-09 PROCEDURE — 97162 PT EVAL MOD COMPLEX 30 MIN: CPT

## 2024-06-09 PROCEDURE — 25000003 PHARM REV CODE 250: Performed by: NURSE PRACTITIONER

## 2024-06-09 PROCEDURE — 85025 COMPLETE CBC W/AUTO DIFF WBC: CPT

## 2024-06-09 PROCEDURE — 97167 OT EVAL HIGH COMPLEX 60 MIN: CPT

## 2024-06-09 PROCEDURE — 25000003 PHARM REV CODE 250: Performed by: STUDENT IN AN ORGANIZED HEALTH CARE EDUCATION/TRAINING PROGRAM

## 2024-06-09 PROCEDURE — 25000003 PHARM REV CODE 250: Performed by: INTERNAL MEDICINE

## 2024-06-09 PROCEDURE — 36415 COLL VENOUS BLD VENIPUNCTURE: CPT

## 2024-06-09 PROCEDURE — 21400001 HC TELEMETRY ROOM

## 2024-06-09 PROCEDURE — 83735 ASSAY OF MAGNESIUM: CPT

## 2024-06-09 RX ORDER — POTASSIUM CHLORIDE 20 MEQ/1
40 TABLET, EXTENDED RELEASE ORAL ONCE
Status: DISCONTINUED | OUTPATIENT
Start: 2024-06-09 | End: 2024-06-10 | Stop reason: HOSPADM

## 2024-06-09 RX ORDER — MAGNESIUM SULFATE 1 G/100ML
1 INJECTION INTRAVENOUS ONCE
Status: DISCONTINUED | OUTPATIENT
Start: 2024-06-09 | End: 2024-06-09

## 2024-06-09 RX ORDER — DAPAGLIFLOZIN 10 MG/1
10 TABLET, FILM COATED ORAL DAILY
Status: DISCONTINUED | OUTPATIENT
Start: 2024-06-09 | End: 2024-06-10 | Stop reason: HOSPADM

## 2024-06-09 RX ADMIN — OXYCODONE HYDROCHLORIDE AND ACETAMINOPHEN 1 TABLET: 5; 325 TABLET ORAL at 08:06

## 2024-06-09 RX ADMIN — CARVEDILOL 6.25 MG: 3.12 TABLET, FILM COATED ORAL at 08:06

## 2024-06-09 RX ADMIN — CYCLOBENZAPRINE 10 MG: 10 TABLET, FILM COATED ORAL at 09:06

## 2024-06-09 RX ADMIN — Medication 400 MG: at 08:06

## 2024-06-09 RX ADMIN — RANOLAZINE 500 MG: 500 TABLET, EXTENDED RELEASE ORAL at 08:06

## 2024-06-09 RX ADMIN — ASPIRIN 81 MG: 81 TABLET, COATED ORAL at 09:06

## 2024-06-09 RX ADMIN — CLOPIDOGREL BISULFATE 75 MG: 75 TABLET ORAL at 08:06

## 2024-06-09 RX ADMIN — SACUBITRIL AND VALSARTAN 1 TABLET: 24; 26 TABLET, FILM COATED ORAL at 08:06

## 2024-06-09 RX ADMIN — GABAPENTIN 100 MG: 100 CAPSULE ORAL at 08:06

## 2024-06-09 RX ADMIN — ISOSORBIDE MONONITRATE 30 MG: 30 TABLET, EXTENDED RELEASE ORAL at 08:06

## 2024-06-09 RX ADMIN — CYCLOBENZAPRINE 10 MG: 10 TABLET, FILM COATED ORAL at 08:06

## 2024-06-09 RX ADMIN — ATORVASTATIN CALCIUM 40 MG: 40 TABLET, FILM COATED ORAL at 08:06

## 2024-06-09 RX ADMIN — OXYCODONE HYDROCHLORIDE AND ACETAMINOPHEN 1 TABLET: 5; 325 TABLET ORAL at 12:06

## 2024-06-09 RX ADMIN — PANTOPRAZOLE SODIUM 40 MG: 40 TABLET, DELAYED RELEASE ORAL at 08:06

## 2024-06-09 RX ADMIN — OXYCODONE HYDROCHLORIDE AND ACETAMINOPHEN 1 TABLET: 5; 325 TABLET ORAL at 09:06

## 2024-06-09 NOTE — PLAN OF CARE
Problem: Physical Therapy  Goal: Physical Therapy Goal  Description: Goals to be met by: 24     Patient will increase functional independence with mobility by performin. Supine to sit with [place]  2. Sit to supine with [place]  3. Sit to stand transfer with Modified [place]  4. Gait  x 300 feet with Modified [place] using Rolling Walker.   5. Ascend/descend 3 stairs with bilateral Handrails & Modified [place]     Outcome: Progressing

## 2024-06-09 NOTE — PT/OT/SLP EVAL
Occupational Therapy  Evaluation    Name: Carlos Waller  MRN: [id number]    Recommendations:     Discharge therapy intensity: Low Intensity Therapy   Discharge Equipment Recommendations:  none  Barriers to discharge:   (ongoing medical needs, severity of deficits)    Assessment:     Carlos Waller is a 68 y.o. male with a medical diagnosis of elective outpatient MV PCI with surgical impella, no s/p impella removal. Hx of CVA. He presents with the following performance deficits affecting function: weakness, impaired endurance, impaired self care skills, gait instability, impaired functional mobility, decreased upper extremity function, pain. Patient overall requiring CGA for functional mobility, min A when descending to lower toilet. Patient's ROM limited by recent impella removal and recent R shoulder injury from a fall ~2 months ago (RN notified). I believe patient will progress well during hospitalization and require low intensity therapy upon d/c.    Rehab Prognosis: Good; patient would benefit from acute skilled OT services to address these deficits and reach maximum level of function.       Plan:     Patient to be seen 4 x/week to address the above listed problems via self-care/home management, therapeutic activities, therapeutic exercises  Plan of Care Expires: 07/09/24  Plan of Care Reviewed with: patient    Subjective     Chief Complaint: pain BUEs  Patient/Family Comments/goals: to go home    Occupational Profile:  Living Environment: lives alone in [facility], 3 [address] (B railing); tub/shower; 1 fall  Previous level of function: independent  Equipment Used at Home:  (has RW and wheelchair but does not use)  Assistance upon Discharge: limited    Pain/Comfort:  Pain Rating 1:  (BUE pain)  Pain Addressed 1: Nurse notified, Cessation of Activity    Patients cultural, spiritual, Jainism conflicts given the current situation: no    Objective:     OT communicated with NSG prior to session.      Patient was found up in  chair with peripheral IV, pulse ox (continuous), telemetry upon OT entry to room.    General Precautions: Standard, fall, recent impella removal (left axilla)  Orthopedic Precautions: N/A  Braces: N/A  Room air  Vital Signs: 99% 71 bpm    Bed Mobility:    Patient completed Sit to Supine with stand by assistance    Functional Mobility/Transfers:  Patient completed Sit <> Stand Transfer with min A with lower surface, otherwise SBA  with  rolling walker   Functional Mobility: ambulating throughout room with CGA using RW    Activities of Daily Living:  Lower Body Dressing: maximal assistance to don socks  Toileting: stand by assistance to perform anterior perihygiene after +void    Functional Cognition:  Intact    Visual Perceptual Skills:  Intact    Upper Extremity Function:  Right Upper Extremity:   WFL except shoulder ROM 2/2 pain from fall (2 months ago)    Left Upper Extremity:  Shoulder ROM limited 2/2 recent impella removal; distall WFL    Therapeutic Positioning  Risk for acquired pressure injuries is decreased due to ability to get to BSC/toilet with assist, intact sensation, and continence.    OT interventions performed during the course of today's session:   Education was provided on benefits of and recommendations for therapeutic positioning  Therapeutic positioning was provided at the conclusion of session to offload all bony prominences for the prevention and/or reduction of pressure injuries and pain management    Skin assessment: all bony prominences were assessed    Findings: known area of altered skin integrity at surgical site    OT recommendations for therapeutic positioning throughout hospitalization:   Follow [facility] Pressure Injury Prevention Protocol  Geomat recommended for sacral protection while UIC    Patient Education:  Patient provided with verbal education education regarding OT role/goals/POC, fall prevention, safety awareness, Discharge/DME recommendations, and pressure ulcer prevention.   Understanding was verbalized.     Patient left HOB elevated with all lines intact, call button in reach, and NSG notified.    GOALS:   Multidisciplinary Problems       Occupational Therapy Goals          Problem: Occupational Therapy    Goal Priority Disciplines Outcome Interventions   Occupational Therapy Goal     OT, PT/OT Progressing    Description: LTG: Pt will perform basic ADLs and ADL transfers with Modified independence using LRAD by discharge.    STG: to be met in two weeks    Pt will complete grooming standing at sink with LRAD with SBA.  Pt will complete UB dressing with SBA.  Pt will complete LB dressing with SBA using LRAD.  Pt will complete toileting with SBA using LRAD.  Pt will complete functional mobility to/from toilet and toilet transfer with SBA using LRAD.                         History:     Past Medical History:   Diagnosis Date    Anemia, unspecified     Arthritis     Chest pain     Coronary artery disease     Coronary artery disease involving coronary bypass graft of native heart, unspecified whether angina present     Hyperlipidemia     Hypertension     Obesity     Right shoulder pain     Sleep apnea     Stroke     left side numbness         Past Surgical History:   Procedure Laterality Date    CARDIAC CATHETERIZATION  2016    COLONOSCOPY W/ POLYPECTOMY      CORONARY ANGIOGRAPHY INCLUDING BYPASS GRAFTS WITH CATHETERIZATION OF LEFT HEART Left 07/29/2022    Procedure: ANGIOGRAM, CORONARY, INCLUDING BYPASS GRAFT, WITH LEFT HEART CATHETERIZATION;  Surgeon: Constantin Martino MD;  Location: [facility] CATH LAB;  Service: Cardiology;  Laterality: Left;    CORONARY ARTERY BYPASS GRAFT  1999    CORONARY BYPASS GRAFT ANGIOGRAPHY  04/19/2024    Procedure: Bypass graft study;  Surgeon: Constantin Martino MD;  Location: [facility] CATH LAB;  Service: Cardiology;;    CORONARY STENT PLACEMENT      x2    HERNIA REPAIR      failed    INTRAVASCULAR ULTRASOUND, CORONARY N/A 6/6/2024    Procedure: Ultrasound-coronary;   Surgeon: Almas Nettles Jr., MD;  Location: [facility] CATH LAB;  Service: Cardiology;  Laterality: N/A;    LEFT HEART CATHETERIZATION Left 04/19/2024    Procedure: Left heart cath;  Surgeon: Constantin Martino MD;  Location: [facility] CATH LAB;  Service: Cardiology;  Laterality: Left;  LHC +/- PCI VIA RT FEMORAL ARTERY    LEFT HEART CATHETERIZATION N/A 6/6/2024    Procedure: Left heart cath;  Surgeon: Almas Nettles Jr., MD;  Location: [facility] CATH LAB;  Service: Cardiology;  Laterality: N/A;    LITHOTRIPSY, CORONARY TRANSLUMINAL, PERCUTANEOUS  6/6/2024    Procedure: LITHOTRIPSY, CORONARY TRANSLUMINAL, PERCUTANEOUS;  Surgeon: Almas Nettles Jr., MD;  Location: [facility] CATH LAB;  Service: Cardiology;;    PERCUTANEOUS TRANSLUMINAL BALLOON ANGIOPLASTY OF BYPASS GRAFT N/A 07/29/2022    Procedure: PTA, BYPASS GRAFT, USING BALLOON;  Surgeon: Constantin Martino MD;  Location: [facility] CATH LAB;  Service: Cardiology;  Laterality: N/A;    STENT, DRUG ELUTING, SINGLE VESSEL, CORONARY  6/6/2024    Procedure: Stent, Drug Eluting, Single Vessel, Coronary;  Surgeon: Almas Nettles Jr., MD;  Location: [facility] CATH LAB;  Service: Cardiology;;       Time Tracking:     OT Date of Treatment:    OT Start Time: 1032  OT Stop Time: 1047  OT Total Time (min): 15 min    Billable Minutes:Evaluation high    6/9/2024

## 2024-06-09 NOTE — PROGRESS NOTES
" [facility] - [facility]    Cardiology  Progress Note    Patient Name: Carlos Waller  MRN: [id number]  Admission Date: 6/5/2024  Hospital Length of Stay: 4 days  Code Status: Full Code   Attending Physician: Lon Zultea MD   Primary Care Physician: Louis Monahan Jr., MD  Expected Discharge Date:   Principal Problem:<principal problem not specified>    Subjective:   Brief HPI/Hospital Course:   Mr. Waller is a 68 year old male, known to Dr. Martino, who underwent elective outpatient MV PCI with Surgical Impella assist by Dr. Flores on 6.5.24. He underwent PCI of Radial Graft Supplying Native OM, Laser Atherectomy of LIMA to LAD with balloon angioplasty for ISR, and balloon angioplasty of distal Native LAD. Noted was failed attempt at PCI of Native RCA , however, patient did have evidence of left to right collaterals. Patient did lose some blood during the procedure, however, post procedure his H/H remained stable. Imeplla was kept in place, hemodynamics stable with no recurrent angina post intervention. Patient admitted to ICU for close monitoring and Impella management.    Hospital Course:  6.6.24: NAD Noted. SR in the 70's. BP Stable. Impella Left Axillary stable with some oozing of blood at insertion site (slow). Area is bandaged and being closely monitored. Stable at current time. P6 Support. PAP 30.6 (15), CVP 6, Adequate UO noted. Denies CP/SOB. Does report lower back pain related to lying flat, had some left forearm pain yesterday and throughout the night which has improved. Right Groin [place] site and bilateral groins soft flat with no evidence of bleed or hematoma noted. He is NPO for LM Intervention with Dr. Nettles today.   6.7.24: NAD Noted. Underwent LHC/PCI Yesterday with Dr. Nettles and tolerated well. No CP/SOB. BP Stable. /60, PAP 37/15 (23), CVP 10.   6.8.24: NAD. VSS. No complaints of CP/SOB/Palps. "I feel okay" H&H 9.6/29.6. Impella removed yesterday by CV Surgery " "  6.9.24: NAD. VSS. No complaints of CP/SOB/Palps. "I feel good today" H&H 9.4/28.2. Awaiting PT Evaluation     PMH: CAD/CABG, Abnormal Stress Test, Obesity, Anemia, ICMO, Severe HERMINIA, Hypertension, Hyperlipidemia, CVA  PSH: LHC, CABG, Hernia Repair, Colonoscopy  Family History: Father- Pancreatic Cancer, Mother- CVA  Social History: Tobacco- Negative, Alcohol- Negative, Substance Abuse- Negative    Previous Diagnostics:  Coronary Angiogram (6.6.24):  PCI of LM into LAD  Left Axillary Impella Assist  Surgeon: Dr. Tho Barnes Echocardiogram (6.5.24):  Limited study to assess impella placement. Impella measures approximately 4.1cm from aortic valve into the left ventricle. EF visually appears 30%.    LHC/RHC MV PCI (6.5.24):  Preprocedure diagnosis-abnormal stress test, surgical turndown, Class 3 angina, chronic systolic heart failure  Postprocedure diagnosis-Obstructive CAD  Estimated blood loss-40 cc  Tissue removal-none  Complications-none  Procedures performed:  Ultrasound-guided bilateral groin access  Coronary, SVG, LIMA angiography  Failed attempt at retrograde  PCI of RCA (unable to cannulate RCA with guide while 5.5 impella was in place)  Shockwave lithotripsy of LM and proximal LAD with 3.5 balloon  IVUS of LM, Radial graft to OM  PCI of ostial radial graft with 4x16 synergy KALYANI  Laser atherectomy of LIMA to LAD, PTCA for ISR with 2.5 NC balloon at 20 yesenia.  PTCA native distal LAD with 2.0 balloon.  Intracoronary nitro and adenosine administration to distal LAD  Perclose Right CFA, angioseal Left CFA  RHC  Findings:  Left main-80% calcified stenosis reduced to 60%   LAD-MID   LCX-  RCA-, Left to right collaterals  Radial graft to OM 90% reduced to 0% post PCI  LIMA to LAD-In stent  reduced to 10% post laser, PTA.  LAD is a bifid system distally  Pa sat pre pci 75, post 70  PAP 33/11 pre, 33/16 post    Impella Placement (Dr. Zuleta) (6.5.24):  5.5 Liter Placement with 10 mm Dacron Conduit " via left axillary approach.    Echocardiogram (4.19.24):  Left Ventricle: Regional wall motion abnormalities present. The apical lateral, anterior and inferior walls are all hypokinetic. Basal and mid walls all augment WNL. There is moderately reduced systolic function with a visually estimated ejection fraction of 35 - 40%.  Tricuspid Valve: There is mild regurgitation.    Echocardiogram (11.9.23):  The study quality is below average.   The left ventricle is normal in size. Global left ventricular systolic function is normal. The left ventricular ejection fraction is 55%. Left ventricular diastolic function is normal. Noted left ventricular hypertrophy. Concentric left ventricular hypertrophy is present. It is mild.   Optison, ultrasound image enhancement, was utilized on this study per standing order for better visualization of left ventricular endocardium.  The patients IV was started in the right arm by Luisana Dent RN. 2ml imaging enhancement during image acquisition, 1ml was wasted. The IV was removed upon completion of the study.   The right atrium is moderately enlarged. The left atrial diameter is mildly increased.  Mild (1+) mitral regurgitation. Mild (1+) tricuspid regurgitation. Mild (1+) pulmonic regurgitation.   The pulmonary artery systolic pressure is 12 mmHg.     CABG (7.16.99):  LIMA to LAD, Left Radial Artery to OM Branch, SVG to Diagonal Branch  Surgeon: Dr. Washington    Review of Systems   Cardiovascular:  Negative for chest pain, dyspnea on exertion, leg swelling and palpitations.   Respiratory:  Negative for shortness of breath.    All other systems reviewed and are negative.    Objective:     Vital Signs (Most Recent):  Temp: 98.8 °F (37.1 °C) (06/09/24 0021)  Pulse: 80 (06/09/24 0021)  Resp: 14 (06/09/24 0943)  BP: (!) 156/80 (06/09/24 0021)  SpO2: 99 % (06/09/24 0021) Vital Signs (24h Range):  Temp:  [98 °F (36.7 °C)-98.8 °F (37.1 °C)] 98.8 °F (37.1 °C)  Pulse:  [67-80] 80  Resp:  [14-19]  14  SpO2:  [97 %-99 %] 99 %  BP: (116-156)/(59-81) 156/80   Weight: 121.5 kg (267 lb 13.7 oz)  Body mass index is 38.43 kg/m².  SpO2: 99 %       Intake/Output Summary (Last 24 hours) at 6/9/2024 1153  Last data filed at 6/9/2024 0051  Gross per 24 hour   Intake --   Output 1175 ml   Net -1175 ml     Lines/Drains/Airways       Peripheral Intravenous Line  Duration                  Peripheral IV - Single Lumen 06/05/24 0709 18 G Left Hand 4 days                  Significant Labs:   Recent Results (from the past 72 hour(s))   POCT glucose    Collection Time: 06/06/24 11:56 AM   Result Value Ref Range    POCT Glucose 90 70 - 110 mg/dL   POCT glucose    Collection Time: 06/06/24 12:09 PM   Result Value Ref Range    POC Glucose 90 70 - 110 MG/DL   POCT glucose    Collection Time: 06/06/24 12:10 PM   Result Value Ref Range    POC Glucose 98 70 - 110 MG/DL   RT Blood Gas    Collection Time: 06/06/24  5:08 PM   Result Value Ref Range    Sample Type Arterial Blood     Sample site Arterial Line     Drawn by ss lrt     pH, Blood gas 7.370 7.350 - 7.450    pCO2, Blood gas 38.0 35.0 - 45.0 mmHg    pO2, Blood gas 56.0 (L) 80.0 - 100.0 mmHg    Sodium, Blood Gas 134 (L) 137 - 145 mmol/L    Potassium, Blood Gas 3.6 3.5 - 5.0 mmol/L    Calcium Level Ionized 1.12 1.12 - 1.23 mmol/L    TOC2, Blood gas 23.2 mmol/L    Base Excess, Blood gas -3.00 (L) -2.00 - 2.00 mmol/L    sO2, Blood gas 87.8 %    HCO3, Blood gas 22.0 22.0 - 26.0 mmol/L    THb, Blood gas 11.6 (L) 12 - 16 g/dL    O2 Hb, Blood Gas 89.7 (L) 94.0 - 97.0 %    CO Hgb 2.4 (H) 0.5 - 1.5 %    Met Hgb 1.0 0.4 - 1.5 %    Allens Test N/A     FIO2, Blood gas 21 %   RT Blood Gas    Collection Time: 06/06/24  5:08 PM   Result Value Ref Range    Sample Type Venous Blood     pH, Blood gas 7.370 7.300 - 7.600    pCO2, Blood gas 44.0 20.0 - 50.0 mmHg    pO2, Blood gas <38.0 mmHg    Sodium, Blood Gas 136 (L) 137 - 145 mmol/L    Potassium, Blood Gas 3.6 3.5 - 5.0 mmol/L    Calcium Level  Ionized 1.13 1.12 - 1.23 mmol/L    TOC2, Blood gas 26.8 mmol/L    Base Excess, Blood gas -0.10 mmol/L    sO2, Blood gas 52.7 %    HCO3, Blood gas 25.4 mmol/L    THb, Blood gas 11.4 g/dL    O2 Hb, Blood Gas 62.9 %    CO Hgb 2.8 %    Met Hgb 0.7 %   POCT glucose    Collection Time: 06/06/24  6:06 PM   Result Value Ref Range    POC Glucose 92 70 - 110 MG/DL   Type & Screen    Collection Time: 06/06/24 10:45 PM   Result Value Ref Range    Group & Rh O NEG     Indirect Julián GEL NEG     Specimen Outdate 06/09/2024 23:59    RT Blood Gas    Collection Time: 06/07/24 12:40 AM   Result Value Ref Range    Sample Type Mixed Venous Blood     Drawn by TDL RRT     pH, Blood gas 7.370     pCO2, Blood gas 42.0 mmHg    pO2, Blood gas <38.0 mmHg    Sodium, Blood Gas 136 mmol/L    Potassium, Blood Gas 3.5 mmol/L    Calcium Level Ionized 1.17 1.12 - 1.32 mmol/L    TOC2, Blood gas 25.6 mmol/L    Base Excess, Blood gas -1.00 mmol/L    sO2, Blood gas 59.3 %    HCO3, Blood gas 24.3 mmol/L    THb, Blood gas 11.3 g/dL    O2 Hb, Blood Gas 72.6 %    CO Hgb 2.5 %    Met Hgb 0.7 %    Oxygen Device, Blood gas Cannula     LPM 3    RT Blood Gas    Collection Time: 06/07/24 12:40 AM   Result Value Ref Range    Sample Type Arterial Blood     Sample site Arterial Line     Drawn by TDL RRT     pH, Blood gas 7.430 7.350 - 7.450    pCO2, Blood gas 36.0 35.0 - 45.0 mmHg    pO2, Blood gas 99.0 80.0 - 100.0 mmHg    Sodium, Blood Gas 133 (L) 137 - 145 mmol/L    Potassium, Blood Gas 3.5 3.5 - 5.0 mmol/L    Calcium Level Ionized 1.10 (L) 1.12 - 1.23 mmol/L    TOC2, Blood gas 25.0 mmol/L    Base Excess, Blood gas -0.10 -2.00 - 2.00 mmol/L    sO2, Blood gas 97.9 %    HCO3, Blood gas 23.9 22.0 - 26.0 mmol/L    THb, Blood gas 11.5 (L) 12 - 16 g/dL    O2 Hb, Blood Gas 95.4 94.0 - 97.0 %    CO Hgb 1.9 (H) 0.5 - 1.5 %    Met Hgb 1.1 0.4 - 1.5 %    Oxygen Device, Blood gas Cannula     LPM 3    CBC with Differential    Collection Time: 06/07/24  2:36 AM   Result Value  Ref Range    WBC 10.19 4.50 - 11.50 x10(3)/mcL    RBC 3.31 (L) 4.70 - 6.10 x10(6)/mcL    Hgb 10.7 (L) 14.0 - 18.0 g/dL    Hct 31.9 (L) 42.0 - 52.0 %    MCV 96.4 (H) 80.0 - 94.0 fL    MCH 32.3 (H) 27.0 - 31.0 pg    MCHC 33.5 33.0 - 36.0 g/dL    RDW 12.8 11.5 - 17.0 %    Platelet 165 130 - 400 x10(3)/mcL    MPV 10.1 7.4 - 10.4 fL    Neut % 73.7 %    Lymph % 13.5 %    Mono % 11.9 %    Eos % 0.4 %    Basophil % 0.2 %    Lymph # 1.38 0.6 - 4.6 x10(3)/mcL    Neut # 7.51 2.1 - 9.2 x10(3)/mcL    Mono # 1.21 0.1 - 1.3 x10(3)/mcL    Eos # 0.04 0 - 0.9 x10(3)/mcL    Baso # 0.02 <=0.2 x10(3)/mcL    IG# 0.03 0 - 0.04 x10(3)/mcL    IG% 0.3 %    NRBC% 0.0 %   Comprehensive Metabolic Panel    Collection Time: 06/07/24  2:37 AM   Result Value Ref Range    Sodium 136 136 - 145 mmol/L    Potassium 3.7 3.5 - 5.1 mmol/L    Chloride 108 (H) 98 - 107 mmol/L    CO2 21 (L) 23 - 31 mmol/L    Glucose 102 82 - 115 mg/dL    Blood Urea Nitrogen 11.7 8.4 - 25.7 mg/dL    Creatinine 0.68 (L) 0.73 - 1.18 mg/dL    Calcium 7.8 (L) 8.8 - 10.0 mg/dL    Protein Total 5.3 (L) 5.8 - 7.6 gm/dL    Albumin 3.3 (L) 3.4 - 4.8 g/dL    Globulin 2.0 (L) 2.4 - 3.5 gm/dL    Albumin/Globulin Ratio 1.7 1.1 - 2.0 ratio    Bilirubin Total 2.2 (H) <=1.5 mg/dL    ALP 45 40 - 150 unit/L    ALT 13 0 - 55 unit/L    AST 23 5 - 34 unit/L    eGFR >60 mL/min/1.73/m2    Anion Gap 7.0 mEq/L    BUN/Creatinine Ratio 17    Magnesium    Collection Time: 06/07/24  2:37 AM   Result Value Ref Range    Magnesium Level 1.90 1.60 - 2.60 mg/dL   MRSA PCR    Collection Time: 06/07/24  5:22 AM   Result Value Ref Range    MRSA PCR Screen Not Detected Not Detected   POCT glucose    Collection Time: 06/07/24  7:55 AM   Result Value Ref Range    POCT Glucose 97 70 - 110 mg/dL   EKG 12-lead    Collection Time: 06/07/24  8:06 AM   Result Value Ref Range    QRS Duration 86 ms    OHS QTC Calculation 445 ms   RT Blood Gas    Collection Time: 06/07/24  9:45 AM   Result Value Ref Range    Sample Type  Venous Blood     Drawn by BD RN     pH, Blood gas 7.380 7.300 - 7.600    pCO2, Blood gas 43.0 20.0 - 50.0 mmHg    pO2, Blood gas <38.0 mmHg    Sodium, Blood Gas 133 (L) 137 - 145 mmol/L    Potassium, Blood Gas 3.9 3.5 - 5.0 mmol/L    Calcium Level Ionized 1.14 1.12 - 1.23 mmol/L    TOC2, Blood gas 26.7 mmol/L    Base Excess, Blood gas 0.10 mmol/L    sO2, Blood gas 67.6 %    HCO3, Blood gas 25.4 mmol/L    THb, Blood gas 10.2 g/dL    O2 Hb, Blood Gas 69.7 %    CO Hgb 2.1 %    Met Hgb 1.2 %    Allens Test N/A    RT Blood Gas    Collection Time: 06/07/24  9:46 AM   Result Value Ref Range    Sample Type Arterial Blood     Sample site Arterial Line     Drawn by sd rrt     pH, Blood gas 7.390 7.350 - 7.450    pCO2, Blood gas 37.0 35.0 - 45.0 mmHg    pO2, Blood gas 143.0 (H) 80.0 - 100.0 mmHg    Sodium, Blood Gas 134 (L) 137 - 145 mmol/L    Potassium, Blood Gas 3.9 3.5 - 5.0 mmol/L    Calcium Level Ionized 1.12 1.12 - 1.23 mmol/L    TOC2, Blood gas 23.5 mmol/L    Base Excess, Blood gas -2.20 (L) -2.00 - 2.00 mmol/L    sO2, Blood gas 99.2 %    HCO3, Blood gas 22.4 22.0 - 26.0 mmol/L    THb, Blood gas 10.2 (L) 12 - 16 g/dL    O2 Hb, Blood Gas 96.2 94.0 - 97.0 %    CO Hgb 1.5 0.5 - 1.5 %    Met Hgb 1.2 0.4 - 1.5 %    Allens Test N/A     Oxygen Device, Blood gas Cannula     LPM 3    Comprehensive Metabolic Panel    Collection Time: 06/08/24  1:43 AM   Result Value Ref Range    Sodium 137 136 - 145 mmol/L    Potassium 4.3 3.5 - 5.1 mmol/L    Chloride 111 (H) 98 - 107 mmol/L    CO2 20 (L) 23 - 31 mmol/L    Glucose 107 82 - 115 mg/dL    Blood Urea Nitrogen 18.6 8.4 - 25.7 mg/dL    Creatinine 0.79 0.73 - 1.18 mg/dL    Calcium 7.9 (L) 8.8 - 10.0 mg/dL    Protein Total 5.0 (L) 5.8 - 7.6 gm/dL    Albumin 2.9 (L) 3.4 - 4.8 g/dL    Globulin 2.1 (L) 2.4 - 3.5 gm/dL    Albumin/Globulin Ratio 1.4 1.1 - 2.0 ratio    Bilirubin Total 1.5 <=1.5 mg/dL    ALP 42 40 - 150 unit/L    ALT 16 0 - 55 unit/L    AST 24 5 - 34 unit/L    eGFR >60  mL/min/1.73/m2    Anion Gap 6.0 mEq/L    BUN/Creatinine Ratio 24    Magnesium    Collection Time: 06/08/24  1:43 AM   Result Value Ref Range    Magnesium Level 2.00 1.60 - 2.60 mg/dL   CBC with Differential    Collection Time: 06/08/24  1:43 AM   Result Value Ref Range    WBC 9.12 4.50 - 11.50 x10(3)/mcL    RBC 2.95 (L) 4.70 - 6.10 x10(6)/mcL    Hgb 9.3 (L) 14.0 - 18.0 g/dL    Hct 28.2 (L) 42.0 - 52.0 %    MCV 95.6 (H) 80.0 - 94.0 fL    MCH 31.5 (H) 27.0 - 31.0 pg    MCHC 33.0 33.0 - 36.0 g/dL    RDW 12.7 11.5 - 17.0 %    Platelet 154 130 - 400 x10(3)/mcL    MPV 10.4 7.4 - 10.4 fL    Neut % 67.8 %    Lymph % 18.3 %    Mono % 12.1 %    Eos % 1.2 %    Basophil % 0.2 %    Lymph # 1.67 0.6 - 4.6 x10(3)/mcL    Neut # 6.18 2.1 - 9.2 x10(3)/mcL    Mono # 1.10 0.1 - 1.3 x10(3)/mcL    Eos # 0.11 0 - 0.9 x10(3)/mcL    Baso # 0.02 <=0.2 x10(3)/mcL    IG# 0.04 0 - 0.04 x10(3)/mcL    IG% 0.4 %    NRBC% 0.0 %   Hemoglobin and Hematocrit    Collection Time: 06/08/24  9:21 AM   Result Value Ref Range    Hgb 9.6 (L) 14.0 - 18.0 g/dL    Hct 29.6 (L) 42.0 - 52.0 %   Magnesium    Collection Time: 06/09/24  4:10 AM   Result Value Ref Range    Magnesium Level 1.90 1.60 - 2.60 mg/dL   Comprehensive Metabolic Panel    Collection Time: 06/09/24  4:10 AM   Result Value Ref Range    Sodium 139 136 - 145 mmol/L    Potassium 3.6 3.5 - 5.1 mmol/L    Chloride 108 (H) 98 - 107 mmol/L    CO2 23 23 - 31 mmol/L    Glucose 98 82 - 115 mg/dL    Blood Urea Nitrogen 16.9 8.4 - 25.7 mg/dL    Creatinine 0.74 0.73 - 1.18 mg/dL    Calcium 8.2 (L) 8.8 - 10.0 mg/dL    Protein Total 5.2 (L) 5.8 - 7.6 gm/dL    Albumin 2.9 (L) 3.4 - 4.8 g/dL    Globulin 2.3 (L) 2.4 - 3.5 gm/dL    Albumin/Globulin Ratio 1.3 1.1 - 2.0 ratio    Bilirubin Total 1.4 <=1.5 mg/dL    ALP 44 40 - 150 unit/L    ALT 19 0 - 55 unit/L    AST 25 5 - 34 unit/L    eGFR >60 mL/min/1.73/m2    Anion Gap 8.0 mEq/L    BUN/Creatinine Ratio 23    CBC with Differential    Collection Time: 06/09/24  4:10  AM   Result Value Ref Range    WBC 7.48 4.50 - 11.50 x10(3)/mcL    RBC 2.95 (L) 4.70 - 6.10 x10(6)/mcL    Hgb 9.4 (L) 14.0 - 18.0 g/dL    Hct 28.2 (L) 42.0 - 52.0 %    MCV 95.6 (H) 80.0 - 94.0 fL    MCH 31.9 (H) 27.0 - 31.0 pg    MCHC 33.3 33.0 - 36.0 g/dL    RDW 12.7 11.5 - 17.0 %    Platelet 197 130 - 400 x10(3)/mcL    MPV 10.6 (H) 7.4 - 10.4 fL    Neut % 61.7 %    Lymph % 24.3 %    Mono % 11.2 %    Eos % 2.1 %    Basophil % 0.3 %    Lymph # 1.82 0.6 - 4.6 x10(3)/mcL    Neut # 4.61 2.1 - 9.2 x10(3)/mcL    Mono # 0.84 0.1 - 1.3 x10(3)/mcL    Eos # 0.16 0 - 0.9 x10(3)/mcL    Baso # 0.02 <=0.2 x10(3)/mcL    IG# 0.03 0 - 0.04 x10(3)/mcL    IG% 0.4 %    NRBC% 0.0 %     EKG:      Telemetry:  Sinus Rhythm    Physical Exam  Vitals and nursing note reviewed.   Constitutional:       General: He is not in acute distress.     Appearance: Normal appearance. He is obese. He is not ill-appearing.   HENT:      Head: Normocephalic.      Mouth/Throat:      Mouth: Mucous membranes are moist.      Pharynx: Oropharynx is clear.   Cardiovascular:      Rate and Rhythm: Normal rate and regular rhythm.      Pulses:           Dorsalis pedis pulses are 1+ on the right side and 1+ on the left side.   Pulmonary:      Effort: No respiratory distress.      Breath sounds: Normal breath sounds. No wheezing or rales.      Comments: NC  Abdominal:      General: There is no distension.      Palpations: Abdomen is soft.      Tenderness: There is no abdominal tenderness. There is no guarding.   Genitourinary:     Comments: Brannon Catheter   Musculoskeletal:         General: Normal range of motion.      Cervical back: Neck supple.      Right lower leg: No edema.      Left lower leg: No edema.      Comments: BLE Warm   Skin:     Comments: Bilateral Groins soft with no evidence of hematoma noted.  Left Axillary Impella Site C/D/I.   Neurological:      General: No focal deficit present.      Mental Status: He is alert and oriented to person, place, and  time. Mental status is at baseline.   Psychiatric:         Mood and Affect: Mood normal.         Behavior: Behavior normal.         Judgment: Judgment normal.       Current Inpatient Medications:  Current Facility-Administered Medications:     0.9%  NaCl infusion (for blood administration), , Intravenous, Q24H PRN, Lon Zuleta MD    acetaminophen tablet 650 mg, 650 mg, Oral, Q4H PRN, Almas Vaz PA-C, 650 mg at 06/07/24 1432    aspirin EC tablet 81 mg, 81 mg, Oral, Daily, Brant Flores MD, 81 mg at 06/09/24 0900    atorvastatin tablet 40 mg, 40 mg, Oral, Daily, Geneva Simon FNP, 40 mg at 06/09/24 0846    carvediloL tablet 6.25 mg, 6.25 mg, Oral, BID, Brant Flores MD, 6.25 mg at 06/09/24 0846    cefazolin (ANCEF) 2 gram in dextrose 5% 50 mL IVPB (premix), 2 g, Intravenous, On Call Procedure, Isabela Wilkes FNP    clopidogreL tablet 75 mg, 75 mg, Oral, Daily, Brant Flores MD, 75 mg at 06/09/24 0846    cyclobenzaprine tablet 10 mg, 10 mg, Oral, TID PRN, Inés Corona DO, 10 mg at 06/09/24 0943    diclofenac sodium 1 % gel 2 g, 2 g, Topical (Top), Daily PRN, Inés Corona DO, 2 g at 06/08/24 2011    gabapentin capsule 100 mg, 100 mg, Oral, BID, Brant Flores MD, 100 mg at 06/09/24 0846    heparin 25,000 units in dextrose 5% 250 mL (100 units/mL) infusion (heparin infusion - NO NOMOGRAM), 3 Units/kg/hr (Adjusted), Intravenous, Continuous, Almas Vaz PA-C    hydrALAZINE injection 10 mg, 10 mg, Intravenous, Q4H PRN, Brant Flores MD, 10 mg at 06/06/24 1910    isosorbide mononitrate 24 hr tablet 30 mg, 30 mg, Oral, Daily, Briana Becerra FNP, 30 mg at 06/09/24 0846    magnesium oxide tablet 400 mg, 400 mg, Oral, BID, Briana Becerra, CELESTE, 400 mg at 06/09/24 0846    morphine injection 2 mg, 2 mg, Intravenous, Q4H PRN, Robert Nash MD, 2 mg at 06/08/24 2010    mupirocin 2 % ointment, , Nasal, On Call Procedure, Isabela Wilkes, CELESTE    ondansetron disintegrating tablet 8 mg, 8  mg, Oral, Q8H PRN, Almas Vaz PA-C    oxyCODONE-acetaminophen 5-325 mg per tablet 1 tablet, 1 tablet, Oral, Q4H PRN, Gordy Brennan PA, 1 tablet at 06/09/24 0943    pantoprazole EC tablet 40 mg, 40 mg, Oral, Daily, Hernan, Rein T, FNP, 40 mg at 06/09/24 0846    ranolazine 12 hr tablet 500 mg, 500 mg, Oral, BID, Hernan, Rein T, FNP, 500 mg at 06/09/24 0846    sacubitriL-valsartan 24-26 mg per tablet 1 tablet, 1 tablet, Oral, BID, Hernan, Rein T, FNP, 1 tablet at 06/09/24 0846    sodium bicarbonate 25 mEq in dextrose 5 % (D5W) 1,000 mL infusion, , Intravenous, Continuous, Lon Zuleta MD    Facility-Administered Medications Ordered in Other Encounters:     diazePAM tablet 10 mg, 10 mg, Oral, On Call Procedure, Constantin Martino MD, 10 mg at 04/19/24 0729    diphenhydrAMINE capsule 50 mg, 50 mg, Oral, On Call Procedure, Constantin Martino MD, 50 mg at 07/29/22 0844    diphenhydrAMINE capsule 50 mg, 50 mg, Oral, On Call Procedure, Constantin Martino MD, 50 mg at 04/19/24 0730    sodium chloride 0.9% flush 10 mL, 10 mL, Intravenous, PRN, Constantin Martino MD    sodium chloride 0.9% flush 10 mL, 10 mL, Intravenous, PRN, Constantin Martino MD  VTE Risk Mitigation (From admission, onward)           Ordered     IP VTE HIGH RISK PATIENT  Once         06/06/24 2151     heparin 25,000 units in dextrose 5% 250 mL (100 units/mL) infusion (heparin infusion - NO NOMOGRAM)  Continuous         06/05/24 0924                  Assessment:   CAD/CABG (LIMA to LAD, LRA to OM, SVG to Diagonal Branch, 1999)    - Status Post LM into LAD PCI/Stenting with Left Axillary Impella Assist (6.6.24) (Dr. Nettles)    - Status Post MV PCI: PCI Ostial Radial Graft to OM (KALYANI), Sock Wave Lithotripsy of LM & Proximal LAD, Laser Atherectomy of LIMA to LAD & PTCA for ISR, PTCA Distal LAD, Failed Attempt at Retrograde  PCI of RCA with 5.5 L Impella Support (6.5.24) (Dr. Flores)  ICMO EF 30%    - Status Post Axillary Impella Placement and Removal  (Re: MV PCI)   Hypertension (BP Stable)  Hyperlipidemia  HERMINIA (Severe)  Elevated BMI/Obesity  Anemia - Worsening   Leukocytosis (Resolved)     - Afebrile   Peripheral Neuropathy  History of CVA    Plan:   Start Farxiga 10 mg oral daily   Continue Atorvastatin 40 mg oral Daily   Monitor H/H; Currently no sign of active bleeding noted   Continue DAPT (Aspirin 81 Mg Daily & Plavix 75 Mg Daily)  Optimize Guideline Directed Medical Therapy for ICMO: Coreg 6.25 Mg PO BID and Entresto 24/26 Mg PO BID in AM (Hold Parameters)-  Continue Protonix for Gastric Protection  Mobilize as able   Replete Lytes  Keep Mag > 2 and Potassium > 4  Possible Discharge on 6.10.24  Labs in AM: CBC, CMP, and Mag     CELESTE Penaloza  Cardiology  [facility] - [facility]  06/09/2024

## 2024-06-09 NOTE — PT/OT/SLP EVAL
Physical Therapy Evaluation    Patient Name:  Carlos Waller   MRN:  [id number]    Recommendations:     Discharge therapy intensity: Low Intensity Therapy   Discharge Equipment Recommendations: none   Barriers to discharge: None    Assessment:     Carlos Waller is a 68 y.o. male s/p t MV PCI with surgical impella assist. Prior to admit, patient lived alone in a SLH with 3 steps (bilateral rails) to enter. At baseline, he is independent. He presents with the following impairments/functional limitations: weakness, impaired endurance, gait instability, decreased safety awareness, pain. He required MIN A for bed mobility. Ambulated 120 ft with RW & CGA. No major safety issues. Patient to benefit from skilled PT to address deficits, improve functional mobility, and progress towards functional independence. Recommending low intensity therapy and use of RW at discharge. Progress as tolerated    Rehab Prognosis: Good; patient would benefit from acute skilled PT services to address these deficits and reach maximum level of function.    Recent Surgery: Procedure(s) (LRB):  Impella, Removal (N/A) 2 Days Post-Op    Plan:     During this hospitalization, patient would benefit from acute PT services 5 x/week to address the identified rehab impairments via therapeutic activities, gait training, therapeutic exercises and progress toward the following goals:    Plan of Care Expires:  07/09/24    Subjective     Chief Complaint: pain  Patient/Family Comments/goals: none  Pain/Comfort:  Pain Rating 1: 5/10  Location - Side 1: Right  Location 1: shoulder  Pain Addressed 1: Reposition, Distraction  Pain Rating Post-Intervention 1: 5/10    Patients cultural, spiritual, Shinto conflicts given the current situation: no    Living Environment:  Lives alone in a SLH with 3 steps (bilateral rails) to enter.   Prior to admission, patients level of function was independent.  Equipment used at home:  (owns RW & WC).  DME owned (not currently  used): rolling walker and wheelchair.  Upon discharge, patient will have assistance from no one.    Objective:     Communicated with nurse prior to session.  Patient found supine with telemetry  upon PT entry to room.    General Precautions: Standard, fall  Orthopedic Precautions:N/A   Braces: N/A  Respiratory Status: Room air    Exams:  Cognitive Exam:  Patient is oriented to Person, Place, Time, and Situation  Sensation: -       Intact  RLE ROM: WFL  RLE Strength: 4/5 grossly  LLE ROM: WFL  LLE Strength: 4/5 grossly  Skin integrity: Visible skin intact      Functional Mobility:  Bed Mobility:  Supine to Sit: minimum assistance  Transfers:  Sit to Stand:  contact guard assistance with rolling walker  Gait: 120 ft with RW & CGA   Balance: fair      AM-PAC 6 CLICK MOBILITY  Total Score:18     Education Provided:  Role and goals of PT, transfer training, bed mobility, gait training, balance training, safety awareness, assistive device, strengthening exercises, and importance of participating in PT to return to PLOF.    Patient left up in chair with all lines intact and call button in reach.    GOALS:   Multidisciplinary Problems       Physical Therapy Goals          Problem: Physical Therapy    Goal Priority Disciplines Outcome Goal Variances Interventions   Physical Therapy Goal     PT, PT/OT Progressing     Description: Goals to be met by: 24     Patient will increase functional independence with mobility by performin. Supine to sit with [place]  2. Sit to supine with [place]  3. Sit to stand transfer with Modified [place]  4. Gait  x 300 feet with Modified [place] using Rolling Walker.   5. Ascend/descend 3 stairs with bilateral Handrails & Modified [place]                          History:     Past Medical History:   Diagnosis Date    Anemia, unspecified     Arthritis     Chest pain     Coronary artery disease     Coronary artery disease involving coronary bypass graft of native  heart, unspecified whether angina present     Hyperlipidemia     Hypertension     Obesity     Right shoulder pain     Sleep apnea     Stroke     left side numbness       Past Surgical History:   Procedure Laterality Date    CARDIAC CATHETERIZATION  2016    COLONOSCOPY W/ POLYPECTOMY      CORONARY ANGIOGRAPHY INCLUDING BYPASS GRAFTS WITH CATHETERIZATION OF LEFT HEART Left 07/29/2022    Procedure: ANGIOGRAM, CORONARY, INCLUDING BYPASS GRAFT, WITH LEFT HEART CATHETERIZATION;  Surgeon: Constantin Martino MD;  Location: [facility] CATH LAB;  Service: Cardiology;  Laterality: Left;    CORONARY ARTERY BYPASS GRAFT  1999    CORONARY BYPASS GRAFT ANGIOGRAPHY  04/19/2024    Procedure: Bypass graft study;  Surgeon: Constantin Martino MD;  Location: [facility] CATH LAB;  Service: Cardiology;;    CORONARY STENT PLACEMENT      x2    HERNIA REPAIR      failed    INTRAVASCULAR ULTRASOUND, CORONARY N/A 6/6/2024    Procedure: Ultrasound-coronary;  Surgeon: Almas Nettles Jr., MD;  Location: [facility] CATH LAB;  Service: Cardiology;  Laterality: N/A;    LEFT HEART CATHETERIZATION Left 04/19/2024    Procedure: Left heart cath;  Surgeon: Constantin Martino MD;  Location: [facility] CATH LAB;  Service: Cardiology;  Laterality: Left;  LHC +/- PCI VIA RT FEMORAL ARTERY    LEFT HEART CATHETERIZATION N/A 6/6/2024    Procedure: Left heart cath;  Surgeon: Almas Nettles Jr., MD;  Location: [facility] CATH LAB;  Service: Cardiology;  Laterality: N/A;    LITHOTRIPSY, CORONARY TRANSLUMINAL, PERCUTANEOUS  6/6/2024    Procedure: LITHOTRIPSY, CORONARY TRANSLUMINAL, PERCUTANEOUS;  Surgeon: Almas Nettles Jr., MD;  Location: [facility] CATH LAB;  Service: Cardiology;;    PERCUTANEOUS TRANSLUMINAL BALLOON ANGIOPLASTY OF BYPASS GRAFT N/A 07/29/2022    Procedure: PTA, BYPASS GRAFT, USING BALLOON;  Surgeon: Constantin Martino MD;  Location: [facility] CATH LAB;  Service: Cardiology;  Laterality: N/A;    STENT, DRUG ELUTING, SINGLE VESSEL, CORONARY  6/6/2024    Procedure: Stent, Drug Eluting,  Single Vessel, Coronary;  Surgeon: Almas Nettles Jr., MD;  Location: [facility] CATH LAB;  Service: Cardiology;;       Time Tracking:     PT Received On: 06/09/24  PT Start Time: 0850     PT Stop Time: 0910  PT Total Time (min): 20 min     Billable Minutes: Evaluation 20 minutes      06/09/2024

## 2024-06-10 VITALS
WEIGHT: 267.88 LBS | DIASTOLIC BLOOD PRESSURE: 77 MMHG | OXYGEN SATURATION: 99 % | HEIGHT: 70 IN | RESPIRATION RATE: 15 BRPM | BODY MASS INDEX: 38.35 KG/M2 | HEART RATE: 71 BPM | TEMPERATURE: 99 F | SYSTOLIC BLOOD PRESSURE: 137 MMHG

## 2024-06-10 LAB
ALBUMIN SERPL-MCNC: 3.1 G/DL (ref 3.4–4.8)
ALBUMIN/GLOB SERPL: 1.1 RATIO (ref 1.1–2)
ALP SERPL-CCNC: 47 UNIT/L (ref 40–150)
ALT SERPL-CCNC: 26 UNIT/L (ref 0–55)
ANION GAP SERPL CALC-SCNC: 9 MEQ/L
AST SERPL-CCNC: 30 UNIT/L (ref 5–34)
BASOPHILS # BLD AUTO: 0.03 X10(3)/MCL
BASOPHILS NFR BLD AUTO: 0.4 %
BILIRUB SERPL-MCNC: 1.7 MG/DL
BUN SERPL-MCNC: 16.6 MG/DL (ref 8.4–25.7)
CALCIUM SERPL-MCNC: 8.8 MG/DL (ref 8.8–10)
CHLORIDE SERPL-SCNC: 106 MMOL/L (ref 98–107)
CO2 SERPL-SCNC: 23 MMOL/L (ref 23–31)
CREAT SERPL-MCNC: 0.73 MG/DL (ref 0.73–1.18)
CREAT/UREA NIT SERPL: 23
EOSINOPHIL # BLD AUTO: 0.18 X10(3)/MCL (ref 0–0.9)
EOSINOPHIL NFR BLD AUTO: 2.3 %
ERYTHROCYTE [DISTWIDTH] IN BLOOD BY AUTOMATED COUNT: 12.7 % (ref 11.5–17)
GFR SERPLBLD CREATININE-BSD FMLA CKD-EPI: >60 ML/MIN/1.73/M2
GLOBULIN SER-MCNC: 2.9 GM/DL (ref 2.4–3.5)
GLUCOSE SERPL-MCNC: 90 MG/DL (ref 82–115)
HCT VFR BLD AUTO: 30.2 % (ref 42–52)
HGB BLD-MCNC: 10.2 G/DL (ref 14–18)
IMM GRANULOCYTES # BLD AUTO: 0.01 X10(3)/MCL (ref 0–0.04)
IMM GRANULOCYTES NFR BLD AUTO: 0.1 %
LYMPHOCYTES # BLD AUTO: 1.82 X10(3)/MCL (ref 0.6–4.6)
LYMPHOCYTES NFR BLD AUTO: 22.8 %
MAGNESIUM SERPL-MCNC: 1.8 MG/DL (ref 1.6–2.6)
MCH RBC QN AUTO: 32.5 PG (ref 27–31)
MCHC RBC AUTO-ENTMCNC: 33.8 G/DL (ref 33–36)
MCV RBC AUTO: 96.2 FL (ref 80–94)
MONOCYTES # BLD AUTO: 0.84 X10(3)/MCL (ref 0.1–1.3)
MONOCYTES NFR BLD AUTO: 10.5 %
NEUTROPHILS # BLD AUTO: 5.09 X10(3)/MCL (ref 2.1–9.2)
NEUTROPHILS NFR BLD AUTO: 63.9 %
NRBC BLD AUTO-RTO: 0 %
PLATELET # BLD AUTO: 286 X10(3)/MCL (ref 130–400)
PMV BLD AUTO: 10.3 FL (ref 7.4–10.4)
POTASSIUM SERPL-SCNC: 3.7 MMOL/L (ref 3.5–5.1)
PROT SERPL-MCNC: 6 GM/DL (ref 5.8–7.6)
RBC # BLD AUTO: 3.14 X10(6)/MCL (ref 4.7–6.1)
SODIUM SERPL-SCNC: 138 MMOL/L (ref 136–145)
WBC # SPEC AUTO: 7.97 X10(3)/MCL (ref 4.5–11.5)

## 2024-06-10 PROCEDURE — 25000003 PHARM REV CODE 250: Performed by: PHYSICIAN ASSISTANT

## 2024-06-10 PROCEDURE — 99024 POSTOP FOLLOW-UP VISIT: CPT | Mod: ,,,

## 2024-06-10 PROCEDURE — 36415 COLL VENOUS BLD VENIPUNCTURE: CPT

## 2024-06-10 PROCEDURE — 25000003 PHARM REV CODE 250: Performed by: NURSE PRACTITIONER

## 2024-06-10 PROCEDURE — 97535 SELF CARE MNGMENT TRAINING: CPT | Mod: CO

## 2024-06-10 PROCEDURE — 25000003 PHARM REV CODE 250: Performed by: INTERNAL MEDICINE

## 2024-06-10 PROCEDURE — 97116 GAIT TRAINING THERAPY: CPT

## 2024-06-10 PROCEDURE — 83735 ASSAY OF MAGNESIUM: CPT

## 2024-06-10 PROCEDURE — 25000003 PHARM REV CODE 250: Performed by: STUDENT IN AN ORGANIZED HEALTH CARE EDUCATION/TRAINING PROGRAM

## 2024-06-10 PROCEDURE — 85025 COMPLETE CBC W/AUTO DIFF WBC: CPT

## 2024-06-10 PROCEDURE — 25000003 PHARM REV CODE 250

## 2024-06-10 PROCEDURE — 80053 COMPREHEN METABOLIC PANEL: CPT

## 2024-06-10 RX ORDER — ROSUVASTATIN CALCIUM 40 MG/1
40 TABLET, COATED ORAL DAILY
Qty: 30 TABLET | Refills: 11 | Status: SHIPPED | OUTPATIENT
Start: 2024-06-10

## 2024-06-10 RX ORDER — MAGNESIUM SULFATE HEPTAHYDRATE 40 MG/ML
2 INJECTION, SOLUTION INTRAVENOUS ONCE
Status: DISCONTINUED | OUTPATIENT
Start: 2024-06-10 | End: 2024-06-10 | Stop reason: HOSPADM

## 2024-06-10 RX ORDER — PANTOPRAZOLE SODIUM 40 MG/1
40 TABLET, DELAYED RELEASE ORAL DAILY
Qty: 90 TABLET | Refills: 3 | Status: SHIPPED | OUTPATIENT
Start: 2024-06-11 | End: 2025-06-11

## 2024-06-10 RX ORDER — DAPAGLIFLOZIN 10 MG/1
10 TABLET, FILM COATED ORAL DAILY
Qty: 30 TABLET | Refills: 11 | Status: SHIPPED | OUTPATIENT
Start: 2024-06-11

## 2024-06-10 RX ADMIN — OXYCODONE HYDROCHLORIDE AND ACETAMINOPHEN 1 TABLET: 5; 325 TABLET ORAL at 08:06

## 2024-06-10 RX ADMIN — ATORVASTATIN CALCIUM 40 MG: 40 TABLET, FILM COATED ORAL at 08:06

## 2024-06-10 RX ADMIN — PANTOPRAZOLE SODIUM 40 MG: 40 TABLET, DELAYED RELEASE ORAL at 08:06

## 2024-06-10 RX ADMIN — DAPAGLIFLOZIN 10 MG: 10 TABLET, FILM COATED ORAL at 08:06

## 2024-06-10 RX ADMIN — SACUBITRIL AND VALSARTAN 1 TABLET: 24; 26 TABLET, FILM COATED ORAL at 08:06

## 2024-06-10 RX ADMIN — ACETAMINOPHEN 650 MG: 325 TABLET, FILM COATED ORAL at 08:06

## 2024-06-10 RX ADMIN — CLOPIDOGREL BISULFATE 75 MG: 75 TABLET ORAL at 08:06

## 2024-06-10 RX ADMIN — ISOSORBIDE MONONITRATE 30 MG: 30 TABLET, EXTENDED RELEASE ORAL at 08:06

## 2024-06-10 RX ADMIN — ASPIRIN 81 MG: 81 TABLET, COATED ORAL at 08:06

## 2024-06-10 RX ADMIN — RANOLAZINE 500 MG: 500 TABLET, EXTENDED RELEASE ORAL at 08:06

## 2024-06-10 RX ADMIN — CARVEDILOL 6.25 MG: 3.12 TABLET, FILM COATED ORAL at 08:06

## 2024-06-10 RX ADMIN — GABAPENTIN 100 MG: 100 CAPSULE ORAL at 08:06

## 2024-06-10 RX ADMIN — CYCLOBENZAPRINE 10 MG: 10 TABLET, FILM COATED ORAL at 08:06

## 2024-06-10 NOTE — PLAN OF CARE
[facility] referral sent via [facility] and home health referral sent to [facility] via [facility].

## 2024-06-10 NOTE — ANESTHESIA POSTPROCEDURE EVALUATION
Anesthesia Post Evaluation    Patient: Carlos ALLEN Sridhar    Procedure(s) Performed: Procedure(s) (LRB):  INSERTION, IMPELLA (N/A)    Final Anesthesia Type: general      Patient location during evaluation: PACU  Patient participation: Yes- Able to Participate  Level of consciousness: awake and alert  Post-procedure vital signs: reviewed and stable  Pain management: adequate  Airway patency: patent      Anesthetic complications: no      Cardiovascular status: hemodynamically stable  Respiratory status: unassisted  Hydration status: euvolemic  Follow-up not needed.              Vitals Value Taken Time   /76 06/10/24 0753   Temp 36.7 °C (98.1 °F) 06/09/24 2015   Pulse 79 06/10/24 0809   Resp 4 06/10/24 0809   SpO2 92 % 06/09/24 1948   Vitals shown include unfiled device data.      No case tracking events are documented in the log.      Pain/Laura Score: Pain Rating Prior to Med Admin: 5 (6/9/2024  8:20 PM)  Pain Rating Post Med Admin: 2 (6/9/2024  9:20 PM)

## 2024-06-10 NOTE — PT/OT/SLP PROGRESS
Occupational Therapy   Treatment    Name: Carlos Waller  MRN: [id number]  Admitting Diagnosis:  CAD (coronary artery disease)  3 Days Post-Op    Recommendations:     Recommended therapy intensity at discharge: Low Intensity Therapy   Discharge Equipment Recommendations:  none  Barriers to discharge:       Assessment:     Carlos Waller is a 68 y.o. male with a medical diagnosis of CAD (coronary artery disease).  He presents with improved balance and increased endurance, overall SBA with RW, recommending low intensity therapy pending progress. Performance deficits affecting function are weakness, impaired endurance, impaired self care skills, impaired functional mobility, impaired balance.     Rehab Prognosis:  Good; patient would benefit from acute skilled OT services to address these deficits and reach maximum level of function.       Plan:     Patient to be seen 4 x/week to address the above listed problems via self-care/home management, therapeutic activities, therapeutic exercises  Plan of Care Expires: 07/09/24  Plan of Care Reviewed with: patient    Subjective     Pain/Comfort:       Objective:     Communicated with: RN prior to session.  Patient found sitting edge of bed with   upon OT entry to room.    General Precautions: Standard, fall    Orthopedic Precautions:N/A  Braces: N/A  Respiratory Status: Room air  Vital Signs: Blood Pressure: 135/75     Occupational Performance:   (Sit to stand- SBA) using RW for UE support with balance.  Pt. Ambulating to toilet using RW, SBA for toilet t/f with safe descend onto low surface.   Pt. Performing LB dressing task with Mod I while donning/doffing socks, figure 4 position.   Pt. Left with PT ambulating in hallway.       Therapeutic Positioning    OT interventions performed during the course of today's session in an effort to prevent and/or reduce acquired pressure injuries:   Therapeutic positioning was provided at the conclusion of session to offload all bony  prominences for the prevention and/or reduction of pressure injuries      [facility] 6 Click ADL:      Patient Education:  Patient provided with verbal education education regarding fall prevention and safety awareness.  Additional teaching is warranted.      Patient left up in chair with all lines intact and call button in reach.    GOALS:   Multidisciplinary Problems       Occupational Therapy Goals          Problem: Occupational Therapy    Goal Priority Disciplines Outcome Interventions   Occupational Therapy Goal     OT, PT/OT Progressing    Description: LTG: Pt will perform basic ADLs and ADL transfers with Modified independence using LRAD by discharge.    STG: to be met in two weeks    Pt will complete grooming standing at sink with LRAD with SBA.  Pt will complete UB dressing with SBA.  Pt will complete LB dressing with SBA using LRAD.  Pt will complete toileting with SBA using LRAD.  Pt will complete functional mobility to/from toilet and toilet transfer with SBA using LRAD.                         Time Tracking:     OT Date of Treatment: 06/10/24  OT Start Time: 1113  OT Stop Time: 1123  OT Total Time (min): 10 min    Billable Minutes:Self Care/Home Management 1    OT/CALI: CALI     Number of CALI visits since last OT visit: 1    6/10/2024

## 2024-06-10 NOTE — PROGRESS NOTES
CT SURGERY PROGRESS NOTE  Carlos Waller  68 y.o.  1955    Patients Procedure: Procedure(s) (LRB):  Impella, Removal (N/A)    Subjective  Interval History: Patient is postoperative day 3 from impella removal.  Siting up in bed, eating breakfast.  VSS, NAD, oxygenating well on room air.     Review of Systems   Constitutional:  Negative for chills, fever and malaise/fatigue.   Respiratory:  Negative for cough, shortness of breath and wheezing.    Cardiovascular:  Negative for chest pain and palpitations.   Gastrointestinal:  Negative for nausea and vomiting.   Musculoskeletal:  Positive for joint pain (right shoulder).       Medication List  Infusions    Scheduled   aspirin  81 mg Oral Daily    atorvastatin  40 mg Oral Daily    carvediloL  6.25 mg Oral BID    clopidogreL  75 mg Oral Daily    dapagliflozin propanediol  10 mg Oral Daily    gabapentin  100 mg Oral BID    isosorbide mononitrate  30 mg Oral Daily    magnesium sulfate IVPB  2 g Intravenous Once    pantoprazole  40 mg Oral Daily    potassium chloride  40 mEq Oral Once    ranolazine  500 mg Oral BID    sacubitriL-valsartan  1 tablet Oral BID       Objective:  Recent Vitals:  Temp:  [98.1 °F (36.7 °C)-98.6 °F (37 °C)] 98.1 °F (36.7 °C)  Pulse:  [64-70] 69  Resp:  [12-20] 18  SpO2:  [97 %-98 %] 98 %  BP: (127-159)/(76-86) 159/76    Physical Exam  Constitutional:       General: He is not in acute distress.     Appearance: He is obese. He is not ill-appearing.   HENT:      Head: Normocephalic and atraumatic.      Mouth/Throat:      Mouth: Mucous membranes are moist.      Pharynx: Oropharynx is clear.   Cardiovascular:      Rate and Rhythm: Normal rate and regular rhythm.      Pulses: Normal pulses.      Heart sounds: Normal heart sounds. No murmur heard.     No friction rub. No gallop.   Pulmonary:      Effort: Pulmonary effort is normal. No respiratory distress.      Breath sounds: Normal breath sounds. No wheezing, rhonchi or rales.   Abdominal:       General: There is no distension.      Palpations: Abdomen is soft.   Musculoskeletal:         General: Normal range of motion.      Cervical back: Normal range of motion and neck supple.      Right lower leg: No edema.      Left lower leg: No edema.   Skin:     General: Skin is warm and dry.      Findings: Bruising (bruising around impella insertion site) present.      Comments:  Left axillary impella site dressed - bloody drainage noted.  Stable.  Sandgap secured to right groin.  Soft to palpation. No evidence of hematoma.  Left groin soft to palpation. NO evidence of hematoma.       Neurological:      General: No focal deficit present.      Mental Status: He is alert and oriented to person, place, and time.   Psychiatric:         Mood and Affect: Mood normal.         Behavior: Behavior normal.          I/O last 24 hrs:  Intake/Output - Last 3 Shifts         06/08 0700  06/09 0659 06/09 0700  06/10 0659 06/10 0700  06/11 0659    P.O.  340     IV Piggyback       Total Intake(mL/kg)  340 (2.8)     Urine (mL/kg/hr) 1175 (0.4)      Stool       Total Output 1175      Net -1175 +340            Urine Occurrence  4 x     Stool Occurrence 0 x              Labs  CBC:   Recent Labs   Lab 06/10/24  0301   WBC 7.97   RBC 3.14*   HGB 10.2*   HCT 30.2*      MCV 96.2*   MCH 32.5*   MCHC 33.8     CMP:   Recent Labs   Lab 06/10/24  0301   CALCIUM 8.8   ALBUMIN 3.1*      K 3.7   CO2 23      BUN 16.6   CREATININE 0.73   ALKPHOS 47   ALT 26   AST 30   BILITOT 1.7*     LFTs:   Recent Labs   Lab 06/10/24  0301   ALT 26   AST 30   ALKPHOS 47   BILITOT 1.7*   ALBUMIN 3.1*     All pertinent labs from the last 24 hours have been reviewed.      Imaging:   CXR: No results found in the last 24 hours.  I have reviewed all pertinent imaging results/findings within the past 24 hours.        ASSESSMENT/PLAN:    Coronary artery disease  ICMO (EF 30%)  HTN  HLD  HERMINIA  Hx of CVA    -s/p left axillary impella insertion and removal  -ok  for discharge home with home health from ct surgery standpoint.  Follow up with Dr. Zuleta in 3 weeks    Case and plan of care discussed with CELESTE Jesus

## 2024-06-10 NOTE — ANESTHESIA POSTPROCEDURE EVALUATION
Anesthesia Post Evaluation    Patient: Carlos ALLEN Sridhar    Procedure(s) Performed: Procedure(s) (LRB):  Impella, Removal (N/A)    Final Anesthesia Type: MAC      Patient location during evaluation: ICU  Patient participation: Yes- Able to Participate  Level of consciousness: awake  Post-procedure vital signs: reviewed and stable  Pain management: adequate  Airway patency: patent  HERMINIA mitigation strategies: Multimodal analgesia  PONV status at discharge: No PONV  Anesthetic complications: no      Cardiovascular status: blood pressure returned to baseline  Respiratory status: spontaneous ventilation  Hydration status: euvolemic  Follow-up not needed.              Vitals Value Taken Time   /76 06/10/24 0600   Temp 36.7 °C (98.1 °F) 06/09/24 2015   Pulse 73 06/10/24 0618   Resp 14 06/10/24 0618   SpO2 92 % 06/09/24 1948   Vitals shown include unfiled device data.      No case tracking events are documented in the log.      Pain/Laura Score: Pain Rating Prior to Med Admin: 5 (6/9/2024  8:20 PM)  Pain Rating Post Med Admin: 2 (6/9/2024  9:20 PM)

## 2024-06-10 NOTE — PT/OT/SLP PROGRESS
Physical Therapy Treatment    Patient Name:  Carlos Waller   MRN:  [id number]    Recommendations:     Discharge therapy intensity: Low Intensity Therapy   Discharge Equipment Recommendations:  (pt borrowing a RW from a friend)  Barriers to discharge: None    Assessment:     Carlos Waller is a 68 y.o. male admitted with a medical diagnosis of s/p PCI and impella.  He presents with the following impairments/functional limitations: weakness, impaired endurance, gait instability, decreased safety awareness, pain. Pt reports he is d/c home today. Pt was able to transfer and ambulate with RW and SBA. No LOB. Has no concerns going home. Will have family support and RW at d/c that he is borrowing from a friend. Safe to d/c home.     Rehab Prognosis: Good; patient would benefit from acute skilled PT services to address these deficits and reach maximum level of function.    Recent Surgery: Procedure(s) (LRB):  Impella, Removal (N/A) 3 Days Post-Op    Plan:     During this hospitalization, patient would benefit from acute PT services 5 x/week to address the identified rehab impairments via therapeutic activities, gait training, therapeutic exercises and progress toward the following goals:    Plan of Care Expires:  07/09/24    Subjective     Chief Complaint: none  Patient/Family Comments/goals: to go home  Pain/Comfort:  Pain Rating 1: 0/10  Pain Addressed 1: Pre-medicate for activity      Objective:     Communicated with nurse prior to session.  Patient found supine with telemetry upon PT entry to room.     General Precautions: Standard, fall  Orthopedic Precautions: N/A  Braces: N/A  Respiratory Status: Room air        Functional Mobility:  Bed Mobility:     Scooting: independence  Supine to Sit: independence  Transfers:     Sit to Stand:  stand by assistance with rolling walker  Toilet Transfer: stand by assistance with  rolling walker  using  Step Transfer  Gait: 180ft with RW and SBA, slow pace, no LOB. Pt reports he has  a ramped entrance into his home and does not need stair training.     Therapeutic Activities/Exercises:      Education:  Patient provided with verbal education education regarding PT role/goals/POC, fall prevention, safety awareness, and discharge/DME recommendations.  Understanding was verbalized.     Patient left up in chair with all lines intact and call button in reach    GOALS:   Multidisciplinary Problems       Physical Therapy Goals          Problem: Physical Therapy    Goal Priority Disciplines Outcome Goal Variances Interventions   Physical Therapy Goal     PT, PT/OT Progressing     Description: Goals to be met by: 24     Patient will increase functional independence with mobility by performin. Supine to sit with [place]  2. Sit to supine with [place]  3. Sit to stand transfer with Modified [place]  4. Gait  x 300 feet with Modified [place] using Rolling Walker.   5. Ascend/descend 3 stairs with bilateral Handrails & Modified [place]                          Time Tracking:     PT Received On: 06/10/24  PT Start Time: 1115     PT Stop Time: 1125  PT Total Time (min): 10 min     Billable Minutes: Gait Training 10    Treatment Type: Treatment  PT/PTA: PT     Number of PTA visits since last PT visit: 1     06/10/2024

## 2024-06-10 NOTE — DISCHARGE SUMMARY
"[facility]    Cardiology  Discharge Summary      Patient Name: Carlos Waller  MRN: [id number]  Admission Date: 6/5/2024  Hospital Length of Stay: 5 days  Discharge Date and Time:  06/10/2024 11:31 AM  Attending Physician: Lon Zuleta MD  Discharging Provider: FRANCES Elizondo  Primary Care Physician: Louis Monahan Jr., MD    HPI/Hospital Course: Mr. Waller is a 68 year old male, known to Dr. Martino, who underwent elective outpatient MV PCI with Surgical Impella assist by Dr. Flores on 6.5.24. He underwent PCI of Radial Graft Supplying Native OM, Laser Atherectomy of LIMA to LAD with balloon angioplasty for ISR, and balloon angioplasty of distal Native LAD. Noted was failed attempt at PCI of Native RCA , however, patient did have evidence of left to right collaterals. Patient did lose some blood during the procedure, however, post procedure his H/H remained stable. Imeplla was kept in place, hemodynamics stable with no recurrent angina post intervention. Patient admitted to ICU for close monitoring and Impella management.     Hospital Course:  6.6.24: NAD Noted. SR in the 70's. BP Stable. Impella Left Axillary stable with some oozing of blood at insertion site (slow). Area is bandaged and being closely monitored. Stable at current time. P6 Support. PAP 30.6 (15), CVP 6, Adequate UO noted. Denies CP/SOB. Does report lower back pain related to lying flat, had some left forearm pain yesterday and throughout the night which has improved. Right Groin [place] site and bilateral groins soft flat with no evidence of bleed or hematoma noted. He is NPO for LM Intervention with Dr. Nettles today.   6.7.24: NAD Noted. Underwent LHC/PCI Yesterday with Dr. Nettles and tolerated well. No CP/SOB. BP Stable. /60, PAP 37/15 (23), CVP 10.   6.8.24: NAD. VSS. No complaints of CP/SOB/Palps. "I feel okay" H&H 9.6/29.6. Impella removed yesterday by CV Surgery   6.9.24: NAD. VSS. No complaints of CP/SOB/Palps. " ""I feel good today" H&H 9.4/28.2. Awaiting PT Evaluation   6.10.24: NAD. VSS. Ambulating with Therapy. Denies CP, SOB and Palps. "I am ready for go home."      PMH: CAD/CABG, Abnormal Stress Test, Obesity, Anemia, ICMO, Severe HERMINIA, Hypertension, Hyperlipidemia, CVA  PSH: LHC, CABG, Hernia Repair, Colonoscopy  Family History: Father- Pancreatic Cancer, Mother- CVA  Social History: Tobacco- Negative, Alcohol- Negative, Substance Abuse- Negative     Previous Diagnostics:  Coronary Angiogram (6.6.24):  PCI of LM into LAD  Left Axillary Impella Assist  Surgeon: Dr. Tho Barnes Echocardiogram (6.5.24):  Limited study to assess impella placement. Impella measures approximately 4.1cm from aortic valve into the left ventricle. EF visually appears 30%.     LHC/RHC MV PCI (6.5.24):  Preprocedure diagnosis-abnormal stress test, surgical turndown, Class 3 angina, chronic systolic heart failure  Postprocedure diagnosis-Obstructive CAD  Estimated blood loss-40 cc  Tissue removal-none  Complications-none  Procedures performed:  Ultrasound-guided bilateral groin access  Coronary, SVG, LIMA angiography  Failed attempt at retrograde  PCI of RCA (unable to cannulate RCA with guide while 5.5 impella was in place)  Shockwave lithotripsy of LM and proximal LAD with 3.5 balloon  IVUS of LM, Radial graft to OM  PCI of ostial radial graft with 4x16 synergy KALYANI  Laser atherectomy of LIMA to LAD, PTCA for ISR with 2.5 NC balloon at 20 yesenia.  PTCA native distal LAD with 2.0 balloon.  Intracoronary nitro and adenosine administration to distal LAD  Perclose Right CFA, angioseal Left CFA  RHC  Findings:  Left main-80% calcified stenosis reduced to 60%   LAD-MID   LCX-  RCA-, Left to right collaterals  Radial graft to OM 90% reduced to 0% post PCI  LIMA to LAD-In stent  reduced to 10% post laser, PTA.  LAD is a bifid system distally  Pa sat pre pci 75, post 70  PAP 33/11 pre, 33/16 post     Impella Placement (Dr. Zuleta) " (6.5.24):  5.5 Liter Placement with 10 mm Dacron Conduit via left axillary approach.     Echocardiogram (4.19.24):  Left Ventricle: Regional wall motion abnormalities present. The apical lateral, anterior and inferior walls are all hypokinetic. Basal and mid walls all augment WNL. There is moderately reduced systolic function with a visually estimated ejection fraction of 35 - 40%.  Tricuspid Valve: There is mild regurgitation.     Echocardiogram (11.9.23):  The study quality is below average.   The left ventricle is normal in size. Global left ventricular systolic function is normal. The left ventricular ejection fraction is 55%. Left ventricular diastolic function is normal. Noted left ventricular hypertrophy. Concentric left ventricular hypertrophy is present. It is mild.   Optison, ultrasound image enhancement, was utilized on this study per standing order for better visualization of left ventricular endocardium.  The patients IV was started in the right arm by Luisana Dent RN. 2ml imaging enhancement during image acquisition, 1ml was wasted. The IV was removed upon completion of the study.   The right atrium is moderately enlarged. The left atrial diameter is mildly increased.  Mild (1+) mitral regurgitation. Mild (1+) tricuspid regurgitation. Mild (1+) pulmonic regurgitation.   The pulmonary artery systolic pressure is 12 mmHg.      CABG (7.16.99):  LIMA to LAD, Left Radial Artery to OM Branch, SVG to Diagonal Branch  Surgeon: Dr. Washington    Procedure(s) (LRB):  Impella, Removal (N/A)   Consults:   Consults (From admission, onward)          Status Ordering Provider     Inpatient consult to Social Work/Case Management  Once        Provider:  (Not yet assigned)    Acknowledged CHET ACKERMAN     Inpatient consult to Cardiothoracic Surgery  Once        Provider:  Lon Zuleta MD    Acknowledged CARLEE MORTON          Final Active Diagnoses:    Diagnosis Date Noted POA    PRINCIPAL PROBLEM:  CAD (coronary artery  disease) [I25.10] 04/25/2024 Yes      Problems Resolved During this Admission:     Discharged Condition: stable  Review of Systems   Constitutional: Negative for malaise/fatigue.   Cardiovascular:  Negative for chest pain, dyspnea on exertion, leg swelling and palpitations.   Respiratory:  Negative for shortness of breath.    All other systems reviewed and are negative.    Physical Exam  Vitals reviewed.   Constitutional:       General: He is not in acute distress.     Appearance: Normal appearance. He is obese. He is not ill-appearing.   HENT:      Head: Normocephalic.      Mouth/Throat:      Mouth: Mucous membranes are moist.   Eyes:      Extraocular Movements: Extraocular movements intact.      Conjunctiva/sclera: Conjunctivae normal.   Cardiovascular:      Rate and Rhythm: Normal rate and regular rhythm.      Pulses:           Dorsalis pedis pulses are 1+ on the right side and 1+ on the left side.   Pulmonary:      Effort: Pulmonary effort is normal. No respiratory distress.      Breath sounds: Normal breath sounds.      Comments: RA  Abdominal:      General: There is no distension.      Palpations: Abdomen is soft.      Tenderness: There is no abdominal tenderness. There is no guarding.   Musculoskeletal:         General: Normal range of motion.      Right lower leg: No edema.      Left lower leg: No edema.      Comments: BLE Warm   Skin:     Comments: Bilateral Groins Soft/Flat, Non-Tender, No Sign of Bleed/Infection. +1 BLE Palpable Pedal Pulses. L Axillary Puncture Site Soft/Flat, Non-Tender, No Sign of Bleed/Infection. Distal Cap Refill < 3 Seconds, +2 L Radial Palpable Pulse   Neurological:      General: No focal deficit present.      Mental Status: He is alert and oriented to person, place, and time. Mental status is at baseline.   Psychiatric:         Mood and Affect: Mood normal.         Behavior: Behavior normal.         Judgment: Judgment normal.       Disposition: Home or Self Care    Follow Up:    Follow-up Information       Lon Zuleta MD Follow up.    Specialty: Cardiothoracic Surgery  Contact information:  [address]  [address]  [facility] [address]  [phone]               Constantin Martino MD Follow up in 1 week(s).    Specialty: Cardiology  Why: Hospital Follow Up  Contact information:  [address] [place] [address]  [phone]                           Patient Instructions:      Activity as tolerated   Order Comments: Discharge Activity:  As Tolerated, No Heavy Lifting > 5 lbs., Notify MD with Symptoms of Bleed/Infection     Medications:  Reconciled Home Medications:      Medication List        START taking these medications      dapagliflozin propanediol 10 mg tablet  Commonly known as: Farxiga  Take 1 tablet (10 mg total) by mouth once daily.  Start taking on: June 11, 2024     pantoprazole 40 MG tablet  Commonly known as: PROTONIX  Take 1 tablet (40 mg total) by mouth once daily.  Start taking on: June 11, 2024     sacubitriL-valsartan 24-26 mg per tablet  Commonly known as: ENTRESTO  Take 1 tablet by mouth 2 (two) times daily.            CHANGE how you take these medications      rosuvastatin 40 MG Tab  Commonly known as: CRESTOR  Take 1 tablet (40 mg total) by mouth once daily.  What changed:   medication strength  how much to take            CONTINUE taking these medications      aspirin 81 MG EC tablet  Commonly known as: ECOTRIN  Take 81 mg by mouth once daily.     carvediloL 6.25 MG tablet  Commonly known as: COREG  Take 6.25 mg by mouth 2 (two) times daily.     clopidogreL 75 mg tablet  Commonly known as: PLAVIX  Take 75 mg by mouth once daily.     gabapentin 100 MG capsule  Commonly known as: NEURONTIN  Take 100 mg by mouth 2 (two) times daily.     isosorbide mononitrate 30 MG 24 hr tablet  Commonly known as: IMDUR  Take 30 mg by mouth once daily.     levocetirizine 5 MG tablet  Commonly known as: XYZAL  Take 5 mg by mouth daily as needed for Allergies.      nitroGLYCERIN 0.4 MG SL tablet  Commonly known as: NITROSTAT  Place 0.4 mg under the tongue every 5 (five) minutes as needed for Chest pain.     ranolazine 500 MG Tb12  Commonly known as: RANEXA  Take 500 mg by mouth 2 (two) times daily.            STOP taking these medications      cyclobenzaprine 10 MG tablet  Commonly known as: FLEXERIL     lisinopriL 30 MG tablet  Commonly known as: PRINIVIL,ZESTRIL     meloxicam 7.5 MG tablet  Commonly known as: MOBIC     omeprazole 40 MG capsule  Commonly known as: PRILOSEC            Impression:  CAD/CABG (LIMA to LAD, LRA to OM, rSVG to Diagonal Branch - 1999)    - Status Post LM into LAD PCI/Stenting with Left Axillary Impella Assist (6.6.24) (Dr. Nettles)    - Status Post MV PCI: PCI Ostial Radial Graft to OM (KALYANI), Sock Wave Lithotripsy of LM & Proximal LAD, Laser Atherectomy of LIMA to LAD & PTCA for ISR, PTCA Distal LAD, Failed Attempt at Retrograde  PCI of RCA with 5.5 L Impella Support (6.5.24) (Dr. Flores)  ICMO EF 30%    - Status Post Axillary Impella Placement and Removal (Re: MV PCI)   Hypertension (BP Stable)  Hyperlipidemia  HERMINIA (Severe)  Elevated BMI/Obesity  Anemia - Worsening   Leukocytosis (Resolved)     - Afebrile   Peripheral Neuropathy  Electrolyte Derangements - Hypokalemia, Hypomagnesemia - Repleted  History of CVA    Plan:   Ok to D/C Home Today after LifeVest is Rendered  F/U as Directed Above  Medications per Medication Reconciliation   Groin Precautions    Heart Failure GDMT:  LVEF: EF 30%   BB: Coreg 6.25mg PO BID   ACEi/ARB/ARNI: Entersto 24/26mg PO BID   MRNA: As OP  SGLT2-Inhibitor: Farxiga 10mg PO Qday  ICD/Device: LifeVest Ordered  Discharge Dry Weight: 121.5kg  Cardiomems Candidate: Possibly if PT has a Readmission  HF Clinic Referral Placed: None    IMPORTANT Antiplatelet Medication Instructions:  The patient, Carlos Waller, was instructed by Hugo Peres on the importance of Antiplatelet Medication(s) and he verbalizes  understanding.   He will continue taking his present antiplatelet Plavix as prescribed.   His antiplatelet Plavix.  **WARNING:  Never stop **PLAVIX, EFFIENT, or BRILINTA** without first speaking to a [facility] provider** (even if another physician or surgeon insists it must be stopped for a procedure)    Time spent on the discharge of patient: 42 minutes    Hugo Peres, FRANCES  Cardiology  [facility]

## 2024-06-18 NOTE — PHYSICIAN QUERY
Due to Documentation Conflict please Clarify the        Type and Acuity of Heart Failure         Chronic Systolic Heart Failure/EF 30%          To Respond To Query  Respond from In Basket  Navigate to the Hospital Chart Completion folder.  Highlight the Physician Query message and click New Note to complete the query.   Response appears in a new window.  After reviewing the chart, respond to the query by selecting the clinically appropriate option from the list and then hit Enter  Click Sign  Unselected options will disappear after signing the note.

## 2024-06-20 ENCOUNTER — OFFICE VISIT (OUTPATIENT)
Dept: CARDIAC SURGERY | Facility: CLINIC | Age: 69
End: 2024-06-20
Payer: MEDICARE

## 2024-06-20 VITALS
BODY MASS INDEX: 37.25 KG/M2 | WEIGHT: 259.63 LBS | HEART RATE: 64 BPM | SYSTOLIC BLOOD PRESSURE: 140 MMHG | DIASTOLIC BLOOD PRESSURE: 80 MMHG

## 2024-06-20 DIAGNOSIS — I25.810 CORONARY ARTERY DISEASE INVOLVING CORONARY BYPASS GRAFT OF NATIVE HEART, UNSPECIFIED WHETHER ANGINA PRESENT: Primary | ICD-10-CM

## 2024-06-20 NOTE — PROGRESS NOTES
Patient is status post left axillary Impella placement for high-risk PCI.  He is doing well without complaints   Vital signs stable/afebrile   Regular rate and rhythm without murmurs rubs or gallops  Coarse breath sounds bilaterally   Wounds CDI   Echocardiogram reviewed   A/P:  Doing well without complaints.  Plan per Cardiology

## 2024-06-20 NOTE — ASSESSMENT & PLAN NOTE
Status post Impella placement in the left axillary artery.  Wound is healed.  Without evidence of abnormality.  Staples removed.  Steri-Strips applied.  Plan per Cardiology.

## 2024-08-06 NOTE — INTERVAL H&P NOTE
Patient name: Carlos Waller  MRN: [id number]  : 1955  Cath Lab Procedure H&P Update    Pre-Procedure Assessment:    I saw and examined the patient face to face. The patient has been re-evaluated and his condition is unchanged. The reason for admission, procedure and care is still present.  Based on the patients H&P, pre-procedure physical exam, relevant diagnostic studies, NPO status and information obtained from the patient, I determine the patient is an appropriate candidate for the proposed procedure and anesthesia planned. I further certify the anesthesia risks, benefits and options have been explained to the patient to which he agrees as documented on the procedural consent.     [email or web address]

## 2024-09-12 ENCOUNTER — EXTERNAL HOME HEALTH (OUTPATIENT)
Dept: HOME HEALTH SERVICES | Facility: HOSPITAL | Age: 69
End: 2024-09-12
Payer: MEDICARE

## 2024-11-12 NOTE — PLAN OF CARE
[facility] Cardiology    Office Consult     Darnell Garcia  [id number]  11/13/2024    Referred By: Batsheva Zavala APRN    Chief Complaint   Patient presents with    cardiac clearance     Carpel Tunnel        Subjective     History of Present Illness:   Darnell Garcia is a 63 y.o. male who presents to the Cardiology Clinic to re-establish care for atrial fibrillation and for cardiac clearance for upcoming surgery with [facility] for carpal tunnel surgery.   The patient presented to Dr. Ríos initially for follow up of atrial fibrillation with rapid ventricular rates.The patient has a past medical history significant for type 2 diabetes mellitus, GERD, and asthma. Past cardiac history significant for coronary artery disease.  In 2017, the patient presented for evaluation of chest pain.  He then had an abnormal pharmacologic MPS, revealed a a small reversible perfusion defect in the basal inferior wall possibly representing attenuation artifact which was followed by coronary angiography in 3/2017.  which revealed a 30 to 50% stenosis in the mid LAD.  Most recently, the patient was evaluated in the cardiology clinic in 10/19 for chest pain.  At that time, a repeat pharmacologic MPS showed no evidence of inducible ischemia. He presented to [facility] in Feb 2021 for evaluation of shortness of breath.  Noted to be in atrial fibrillation with rapid ventricular rates and subsequently underwent GENESIS with cardioversion and restoration of sinus rhythm. He was started on Eliquis for anticoagulation, as well as sotalol for rhythm control.  He reported having difficulty with Sotalol, and it was discontinued.  He was lost to follow up after being seen here.  He denied any recurrence of atrial fibrillation although states he was previously asymptomatic.  He continues to take Eliquis for CVA prophylaxis and Metoprolol for rate control.  He denies any chest pain or lower extremity swelling.  States he  [employer] with pt insurance, will send clinicals to stat per pt request via [employer].   has asthma and his shortness of breath is at baseline for him.  He is able to walk up a flight of stairs or around the block without difficulty.  He works as a  for a company and types a lot.    Past Cardiac Testin. Last Coronary Angio: 3/2/2017              1.  Mild to moderate single-vessel disease involving 30 to 50% stenosis in the mid LAD              2.  No significant disease in the LCx, left main, or RCA.              3.  Normal left ventricular systolic function, LVEF 55-60%              4.  LVEDP 50 mmHg  2. Prior Stress Testing: Pharmacologic               1.  MPS 2017                          1.  Small sized reversible perfusion defect involving the basal inferior wall, which in the presence of normal wall motion and gastrointestinal uptake may represent attenuation artifact.              2.  MPS 10/19                          1.  No evidence of inducible ischemia  3.  Holter Monitor--  2 week Monitor - 2021 - Average HR: 70. Min HR: 48. Max HR: 231. Rare A. Tach and Non-sustained V. Tach    Review of Systems   Constitutional:  Negative for activity change and fatigue.   Respiratory:  Negative for chest tightness and shortness of breath.    Cardiovascular:  Negative for chest pain, palpitations and leg swelling.   Neurological:  Negative for dizziness.   All other systems reviewed and are negative.       Past Medical History:   Diagnosis Date    A-fib     Arthritis     Asthma     Carpal tunnel syndrome of right wrist 2024    Cataracts, both eyes     COPD (chronic obstructive pulmonary disease)     Coronary artery disease     COVID-19 vaccine series completed     with booster x2    Diabetes mellitus     Difficulty walking     Both ankle surgeries    Erectile dysfunction     GERD (gastroesophageal reflux disease)     History of cardiac catheterization     pt states approx 2017 -no stent needed    History of nuclear stress test     ?    History of  varicella     Hyperlipidemia     Neuropathy     feet    Neuropathy in diabetes     Gabapentin    Peripheral neuropathy     Sleep apnea     diagnosed but does not use CPAP    Wears glasses        Past Surgical History:   Procedure Laterality Date    ANKLE FUSION Bilateral     screws in both    APPENDECTOMY      COLON RESECTION Right 11/07/2022    Procedure: LAPROSCOPIC RIGHT COLON RESECTION HAND ASSIST;  Surgeon: Kalli Aburto MD;  Location: [facility] OR;  Service: General;  Laterality: Right;    COLONOSCOPY  10 years ago    COLONOSCOPY N/A 05/13/2022    Procedure: COLONOSCOPY EMR POLYPECTOMY AND ORISE INJECTION;  Surgeon: Latonia Art MD;  Location: [facility] ENDOSCOPY;  Service: Gastroenterology;  Laterality: N/A;    COLONOSCOPY N/A 09/02/2022    Procedure: COLONOSCOPY WITH BIOPSY AND POLYPECTOMY;  Surgeon: Latonia Art MD;  Location: [facility] ENDOSCOPY;  Service: Gastroenterology;  Laterality: N/A;    COLONOSCOPY N/A 11/28/2023    Procedure: COLONOSCOPY;  Surgeon: Kalli Aburto MD;  Location: [facility] ENDOSCOPY;  Service: Gastroenterology;  Laterality: N/A;    NASAL SEPTUM SURGERY  2009    SINUS SURGERY      SPINE SURGERY  03/17/2023    fusion of C6, C7, T1    VASECTOMY  1988    VENTRAL HERNIA REPAIR      2007 and 2009 (mesh placed)    WISDOM TOOTH EXTRACTION         Family History   Problem Relation Age of Onset    Breast cancer Mother     Osteoarthritis Mother     Arthritis Mother     Cancer Mother         Breast    Coronary artery disease Father     Heart attack Father         late 60's    Cancer Father         Skin    Lymphoma Son     Leukemia Son     Cancer Son         Lymphoblastic Leukemia    Colon cancer Neg Hx        Social History     Socioeconomic History    Marital status:    Tobacco Use    Smoking status: Never     Passive exposure: Never    Smokeless tobacco: Never   Vaping Use    Vaping status: Never Used   Substance and Sexual Activity    Alcohol use: Yes     Alcohol/week:  4.0 standard drinks of alcohol     Types: 4 Cans of beer per week     Comment: on weekends    Drug use: No    Sexual activity: Not Currently     Partners: Female     Birth control/protection: Vasectomy         Current Outpatient Medications:     ACETAMINOPHEN ER PO, Take 1,000 mg by mouth 2 (Two) Times a Day As Needed for Mild Pain. Take 1H251hi tablets by mouth twice daily for arthritis pain., Disp: , Rfl:     albuterol sulfate  (90 Base) MCG/ACT inhaler, Inhale 2 puffs Every 4 (Four) Hours As Needed for Wheezing., Disp: 8.5 g, Rfl: 5    apixaban (Eliquis) 5 MG tablet tablet, Take 1 tablet by mouth Every 12 (Twelve) Hours., Disp: 90 tablet, Rfl: 1    atorvastatin (LIPITOR) 40 MG tablet, Take 1 tablet by mouth Every Evening., Disp: 90 tablet, Rfl: 1    Blood Glucose Monitoring Suppl (Accu-Chek Guide Me) w/Device kit, USE AS DIRECTED, Disp: 1 kit, Rfl: 0    cholecalciferol (VITAMIN D3) 10 MCG (400 UNIT) tablet, TAKE ONE TABLET BY MOUTH EVERY DAY, Disp: 90 tablet, Rfl: 1    Continuous Glucose Sensor (Dexcom G7 Sensor) misc, AS DIRECTED, Disp: 3 each, Rfl: 5    empagliflozin (Jardiance) 25 MG tablet tablet, Take 1 tablet by mouth Daily., Disp: 90 tablet, Rfl: 1    famotidine (PEPCID) 20 MG tablet, Take 1 tablet by mouth Every Night., Disp: , Rfl:     fluticasone (FLONASE) 50 MCG/ACT nasal spray, INHALE 1 SPRAY IN EACH NOSTRIL ONCE DAILY, Disp: 16 g, Rfl: 0    Fluticasone-Umeclidin-Vilant (Trelegy Ellipta) 200-62.5-25 MCG/ACT inhaler, Inhale 1 puff Daily., Disp: 60 each, Rfl: 5    gabapentin (NEURONTIN) 300 MG capsule, TAKE TWO CAPSULES BY MOUTH TWICE DAILY, Disp: 120 capsule, Rfl: 0    glimepiride (AMARYL) 4 MG tablet, TAKE ONE TABLET BY MOUTH EVERY MORNING BEFORE BREAKFAST, Disp: 90 tablet, Rfl: 1    glucose blood (Accu-Chek Guide) test strip, 100 each by Other route See Admin Instructions. Use as instructed, Disp: 100 each, Rfl: 12    Insulin Glargine (LANTUS SOLOSTAR) 100 UNIT/ML injection pen, Inject 16  "Units under the skin into the appropriate area as directed Every Morning., Disp: 15 mL, Rfl: 2    insulin lispro (HumaLOG) 100 UNIT/ML injection, 5 u 10 min before meals sc., Disp: 3 mL, Rfl: 12    metFORMIN (GLUCOPHAGE) 1000 MG tablet, Take 1 tablet by mouth 2 (Two) Times a Day With Meals., Disp: 180 tablet, Rfl: 1    metoprolol succinate XL (TOPROL-XL) 25 MG 24 hr tablet, Take 0.5 tablets by mouth Daily., Disp: 45 tablet, Rfl: 3    montelukast (SINGULAIR) 10 MG tablet, TAKE ONE TABLET BY MOUTH AT BEDTIME, Disp: 90 tablet, Rfl: 1    multivitamin with minerals tablet tablet, Take 1 tablet by mouth Daily., Disp: , Rfl:     nitroglycerin (NITROSTAT) 0.4 MG SL tablet, Place 1 tablet under the tongue Every FIVE Minutes As Needed for Chest Pain. Take no more than 3 doses in 15 minutes., Disp: 25 tablet, Rfl: 2    ondansetron ODT (ZOFRAN-ODT) 4 MG disintegrating tablet, Place 1 tablet on the tongue Every 8 (Eight) Hours As Needed for Nausea or Vomiting., Disp: 20 tablet, Rfl: 0    predniSONE (DELTASONE) 5 MG tablet, TAKE ONE TABLET BY MOUTH EVERY DAY, Disp: 90 tablet, Rfl: 1    Unifine SafeControl Pen Needle 30G X 8 MM misc, USE AS DIRECTED, Disp: 100 each, Rfl: 12      Allergies   Allergen Reactions    Aspirin Shortness Of Breath     As a child       Objective     Vitals:    11/13/24 0830   BP: 132/78   Pulse: 64   Resp: 18   SpO2: 99%   Weight: 95.3 kg (210 lb)   Height: 182.9 cm (72\")     Body mass index is 28.48 kg/m².    Physical Exam  Vitals and nursing note reviewed.   Constitutional:       General: He is not in acute distress.     Appearance: Normal appearance. He is not toxic-appearing.   HENT:      Head: Normocephalic.      Mouth/Throat:      Mouth: Mucous membranes are moist.   Eyes:      Pupils: Pupils are equal, round, and reactive to light.   Cardiovascular:      Rate and Rhythm: Normal rate and regular rhythm.      Pulses: Normal pulses.      Heart sounds: Normal heart sounds. No murmur heard.  Pulmonary: "      Effort: Pulmonary effort is normal.      Breath sounds: Normal breath sounds. No wheezing, rhonchi or rales.   Musculoskeletal:      Right lower leg: No edema.      Left lower leg: No edema.   Skin:     General: Skin is warm and dry.      Capillary Refill: Capillary refill takes less than 2 seconds.   Neurological:      Mental Status: He is alert and oriented to person, place, and time. Mental status is at baseline.   Psychiatric:         Mood and Affect: Mood normal.         Behavior: Behavior normal.         Thought Content: Thought content normal.         Results Review:   I reviewed the patient's new clinical results.      ECG 12 Lead    Date/Time: 11/13/2024 8:50 AM  Performed by: Nancy Gay APRN    Authorized by: Nancy Gay APRN  Comparison: compared with previous ECG   Similar to previous ECG  Rhythm: sinus rhythm  Conduction: right bundle branch block    Clinical impression: abnormal EKG          Assessment & Plan  Preoperative cardiovascular examination  -Presents today for preoperative cardiac risk assessment in consideration of undergoing orthopedic surgery  -ECG today shows sinus rhythm with right BBB   -No chest pain or exertional anginal symptoms  -No evidence of decompensated CHF, valvulopathy, or arrhythmia  -The patient is currently at moderate risk for adverse perioperative cardiac events with a low risk revised cardiac risk index of 6.6%.  Given functional status is > 4 METS without anginal symptoms it is reasonable to proceed with the proposed procedure without further cardiac testing or interventions.    -Currently holding Eliquis per patient report--may resume POD #1  -Follow up in one year with Dr. Ríos, sooner if needed    Orders:    ECG 12 Lead    Coronary artery disease involving native coronary artery of native heart without angina pectoris  -Last Coronary Angio: 3/2/2017--Mild to moderate single-vessel disease involving 30 to 50% stenosis in the mid LAD  -Continue  statin therapy    Orders:    ECG 12 Lead    Paroxysmal atrial fibrillation  -Continue Metoprolol for rate control  -Continue Eliquis for CVA prophylaxis  -Asymptomatic with episodes in the past  -No evidence/ report of recurrence       Mixed hyperlipidemia  -Lipid panel in May 2024 WNL  -Continue statin therapy         Type 2 diabetes mellitus without complication, with long-term current use of insulin  -A1c in May--8%  -Managed by PCP           Preventative Cardiology:   Tobacco Cessation: N/A   Advance Care Planning: ACP discussion was held with the patient during this visit. Patient has an advance directive in EMR which is still valid.      Follow Up:   Return in about 1 year (around 11/13/2025) for Next scheduled follow up--Dr. Ríos.      Thank you for allowing me to participate in the care of your patient. Please to not hesitate to contact me with additional questions or concerns.     Nancy Gay, APRN

## (undated) DEVICE — CATH EMPULSE ANGLED 5FR PIGTAI

## (undated) DEVICE — VALVE CONTROL COPILOT

## (undated) DEVICE — SUT SILK 2-0 SH 18IN BLACK

## (undated) DEVICE — CATH NC EMERGE MR 2.5X30MM

## (undated) DEVICE — CATH NC EMERGE MR 4X15MM

## (undated) DEVICE — DEVICE BASIXCOMPAK INFL 20ML

## (undated) DEVICE — SPONGE LAP 18X18 PREWASHED

## (undated) DEVICE — SET ANGIO ACIST CVI ANGIOTOUCH

## (undated) DEVICE — YANKAUER OPEN TIP W/O VENT

## (undated) DEVICE — SOL NORMAL USPCA 0.9%

## (undated) DEVICE — SUT 2-0 VICRYL / CT-1

## (undated) DEVICE — GUIDE LAUNCHER 6FR HS I 100CM

## (undated) DEVICE — APPLICATOR CHLORAPREP ORN 26ML

## (undated) DEVICE — GUIDE LAUNCHER 6FR IMA

## (undated) DEVICE — PAD DEFIB CADENCE ADULT R2

## (undated) DEVICE — SET MICROPUNCTUREECHO STIFF

## (undated) DEVICE — RELOAD TRI-STAPLE 2.0 30MM

## (undated) DEVICE — TUBE SUCTION 6.5 TIP 6FR

## (undated) DEVICE — CATH EMERGE MR 12 X 4.00

## (undated) DEVICE — GUIDE LAUNCHER IM 90

## (undated) DEVICE — DRESSING TELFA + RECT 6X10IN

## (undated) DEVICE — GUIDEWIRE VASC 3X200CM .014IN

## (undated) DEVICE — BOWL STERILE LARGE 32OZ

## (undated) DEVICE — CATH ANGIO EBU3.5 6FRX90CM

## (undated) DEVICE — CATH NC QUANTUM APEX MR 2.5X12

## (undated) DEVICE — GOWN SMARTSLEEVE AAMI LVL4 XXL

## (undated) DEVICE — SYR 30CC LUER LOCK

## (undated) DEVICE — GUIDEWIRE GLADIUS STR 190CM

## (undated) DEVICE — SUT SILK 2-0 BLK BR KS 30 I

## (undated) DEVICE — COVER C-ARM STRAP BAND 44X80IN

## (undated) DEVICE — SHEATH INTRODUCER 7FR 11CM

## (undated) DEVICE — CATH BLLN FG APEX MR 2.00X8MM

## (undated) DEVICE — CATH NC EMERGE MR 3.5X30MM

## (undated) DEVICE — GUIDEWIRE VASC 4X180CM .014IN

## (undated) DEVICE — SUT MONOCRYL PLUS UD 3-0 27

## (undated) DEVICE — CATH MICRO CORSAIR PRO 135CM

## (undated) DEVICE — CATH NC EMERGE MR 3X15MM

## (undated) DEVICE — WIRE PILOT .014X190

## (undated) DEVICE — GUIDE LAUNCHER 6FR AR 2.0

## (undated) DEVICE — CATH NC EMERGE MR 4X12MM

## (undated) DEVICE — STAPLER ENDO GIA ULT UNIV 12MM

## (undated) DEVICE — SET EXT NAMIC ARTERIAL 12IN

## (undated) DEVICE — CATH EMERGE MR 40X2.0MM

## (undated) DEVICE — KIT MINI STICK MAX 5F 21GX10CM

## (undated) DEVICE — CATH GUID LNCH AL1 SH 7F 100CM

## (undated) DEVICE — ELECTRODE REM POLYHESIVE II

## (undated) DEVICE — CATH SHOCKWAVE C2+ IVL 4.0X12M

## (undated) DEVICE — INTRODUCER ANGIO PINNACLE 5X10

## (undated) DEVICE — DEVICE INDEFLATOR BASIX

## (undated) DEVICE — CATH GUIDE LINER  V3 6F

## (undated) DEVICE — CATH EMERGE MR 15 X 3.00

## (undated) DEVICE — DRESSING TRANS 4X4 TEGADERM

## (undated) DEVICE — Device

## (undated) DEVICE — DRAPE FULL SHEET 70X100IN

## (undated) DEVICE — SUT PROLENE 5-0 36IN C-1

## (undated) DEVICE — KIT HAND CONTROL HIGH PRESSUR

## (undated) DEVICE — CATH SHOCKWAVE C2+ IVL 3.5X12M

## (undated) DEVICE — CLIP HORIZON LIG TI MED-LG

## (undated) DEVICE — TOWEL OR DISP STRL BLUE 4/PK

## (undated) DEVICE — SUT SILK BLK BR. 2 2-60

## (undated) DEVICE — DEVICE TRAPPER EXCHANGE

## (undated) DEVICE — CATH IMPELLA SMARTASSIST 5.5

## (undated) DEVICE — CANNULA ADULT NASAL 7FT

## (undated) DEVICE — GUIDEWIRE VASC 3X190CM .014IN

## (undated) DEVICE — BAG MEDI-PLAST DECANTER C-FLOW

## (undated) DEVICE — SHEATH INTRODUCER 6FR 11CM

## (undated) DEVICE — ELECTRODE PATIENT RETURN DISP

## (undated) DEVICE — KIT SURGIFLO HEMOSTATIC MATRIX

## (undated) DEVICE — CLIP LIGATING HEMOCLP SMALL

## (undated) DEVICE — CATH IMPULSE IM CRV 100CM 5FR

## (undated) DEVICE — STOPCOCK 3-WAY

## (undated) DEVICE — GLOVE BIOGEL 7.5

## (undated) DEVICE — KIT IRR SUCTION HND PIECE

## (undated) DEVICE — GUIDEWIRE RUNTHROUGH EF 180CM

## (undated) DEVICE — ELECTRODE BLADE INSULATED 1 IN

## (undated) DEVICE — CATH GLIDE ANGLED 5FR 65CM

## (undated) DEVICE — TRAY CATH FOL SIL TEMP 10 16FR

## (undated) DEVICE — EVEROLIMUS-ELUTING PLATINUM CHROMIUM CORONARY STENT SYSTEM
Type: IMPLANTABLE DEVICE | Site: CORONARY | Status: NON-FUNCTIONAL
Brand: SYNERGY™ XD

## (undated) DEVICE — WIRE PTCE CHOICE PT .014X182

## (undated) DEVICE — FASTNER CATH PERC DRAINAGE

## (undated) DEVICE — GUIDEWIRE SION BLUE STR 190CM

## (undated) DEVICE — SUT PERCLOSE PROSTYLE MEDIATE

## (undated) DEVICE — BLANKET HYPER ADULT 24X60IN

## (undated) DEVICE — COVER PROBE US 5.5X58L NON LTX

## (undated) DEVICE — CATH EAGLE EYE PLATINUM

## (undated) DEVICE — CATH SWAN GANZ STND 7FR

## (undated) DEVICE — GUIDEWIRE SION BLK STR 190CM

## (undated) DEVICE — INTRODUCER PINNACLE 7FRX45CM

## (undated) DEVICE — NDL 10CC 20GAX1 COMBO

## (undated) DEVICE — CATH IMPULSE MPA1 5FR 100CM

## (undated) DEVICE — SUT GORE-TEX CV-5 TTC-13 36

## (undated) DEVICE — GUIDEWIRE V-18 CONTROL .18

## (undated) DEVICE — KIT CATHGARD DBL LUMN 9FRX11.5

## (undated) DEVICE — KIT MINI STK MAX COAX 5FR 10CM

## (undated) DEVICE — GLOVE SURG BIOGEL LATEX SZ 7.5

## (undated) DEVICE — SYS WASTE FLD DISPOSAL 1400ML

## (undated) DEVICE — DRAPE TOP 53X102IN

## (undated) DEVICE — SHEATH DESTINATION 6F X 45CM

## (undated) DEVICE — STOPCOCK 4-WAY

## (undated) DEVICE — DRAPE INCISE IOBAN 2 23X23IN

## (undated) DEVICE — STAPLER SKIN PROXIMATE WIDE

## (undated) DEVICE — SHEATH INTRODUCER 5FR 10CM

## (undated) DEVICE — CATH IMPULSE 5F 100CM FR4

## (undated) DEVICE — CATH BLLN FG APEX MR 2.00X12MM

## (undated) DEVICE — GUIDEWIRE INQWIRE SE 3MM JTIP

## (undated) DEVICE — SOL NACL IRR 1000ML BTL

## (undated) DEVICE — CATH LASER POINT ELCA 9 X 80

## (undated) DEVICE — CLIP LIGATING MEDIUM

## (undated) DEVICE — CATH NC EMERGE MR 2.5X20MM

## (undated) DEVICE — GLOVE PROTEXIS PI SYN SURG 7.5

## (undated) DEVICE — TOWEL OR BLUE STRL 16X26 8/PK

## (undated) DEVICE — GLOVE PROTEXIS BLUE LATEX 7

## (undated) DEVICE — CATH IMPULSE MP 5FR 145CM

## (undated) DEVICE — GUIDE LAUNCHER 6FR JR 4.0 SH

## (undated) DEVICE — SOL D5W 250CC BAG

## (undated) DEVICE — GUIDE LAUNCHER 6FR EBU 3.5

## (undated) DEVICE — CATH IMPULSE FL4 5FR 100CM

## (undated) DEVICE — SPONGE GAUZE 16PLY 4X4

## (undated) DEVICE — GLOVE PROTEXIS NEOPRN SZ8

## (undated) DEVICE — KIT SURGICAL TURNOVER

## (undated) DEVICE — CATH CARAVEL MICRO 150CM

## (undated) DEVICE — GLOVE PROTEXIS HYDROGEL SZ6.5

## (undated) DEVICE — PAD HEARTSTART DEFIB ADULT

## (undated) DEVICE — LOOP STERION MAXI YEL 1X406MM

## (undated) DEVICE — CANNULA NASAL ADULT